# Patient Record
Sex: FEMALE | Race: BLACK OR AFRICAN AMERICAN | NOT HISPANIC OR LATINO | Employment: FULL TIME | URBAN - METROPOLITAN AREA
[De-identification: names, ages, dates, MRNs, and addresses within clinical notes are randomized per-mention and may not be internally consistent; named-entity substitution may affect disease eponyms.]

---

## 2017-01-02 ENCOUNTER — HOSPITAL ENCOUNTER (EMERGENCY)
Facility: OTHER | Age: 22
Discharge: HOME | End: 2017-01-02
Attending: OBSTETRICS & GYNECOLOGY
Payer: MEDICAID

## 2017-01-02 VITALS — SYSTOLIC BLOOD PRESSURE: 116 MMHG | TEMPERATURE: 97 F | HEART RATE: 83 BPM | DIASTOLIC BLOOD PRESSURE: 67 MMHG

## 2017-01-02 DIAGNOSIS — Z3A.20 20 WEEKS GESTATION OF PREGNANCY: ICD-10-CM

## 2017-01-02 DIAGNOSIS — R10.9 ABDOMINAL CRAMPS: Primary | ICD-10-CM

## 2017-01-02 LAB
BILIRUB SERPL-MCNC: NEGATIVE MG/DL
BLOOD URINE, POC: NEGATIVE
CANDIDA RRNA VAG QL PROBE: NEGATIVE
COLOR, POC UA: YELLOW
G VAGINALIS RRNA GENITAL QL PROBE: NEGATIVE
GLUCOSE UR QL STRIP: NEGATIVE
KETONES UR QL STRIP: NEGATIVE
LEUKOCYTE ESTERASE URINE, POC: NEGATIVE
NITRITE, POC UA: NEGATIVE
PH, POC UA: 7
PROTEIN, POC: NORMAL
SPECIFIC GRAVITY, POC UA: 1.01
T VAGINALIS RRNA GENITAL QL PROBE: NEGATIVE
UROBILINOGEN, POC UA: NEGATIVE

## 2017-01-02 PROCEDURE — 99283 EMERGENCY DEPT VISIT LOW MDM: CPT | Mod: ,,, | Performed by: OBSTETRICS & GYNECOLOGY

## 2017-01-02 PROCEDURE — 87480 CANDIDA DNA DIR PROBE: CPT

## 2017-01-02 PROCEDURE — 99283 EMERGENCY DEPT VISIT LOW MDM: CPT

## 2017-01-02 PROCEDURE — 87591 N.GONORRHOEAE DNA AMP PROB: CPT

## 2017-01-02 NOTE — ED PROVIDER NOTES
Encounter Date: 1/2/2017       History     Chief Complaint   Patient presents with    Abdominal Cramping     Review of patient's allergies indicates:  No Known Allergies  HPI Comments: Ms. Harris is a 22yo G1 at 20 1/7 wga with prenatal care with Dr. Rajeev Cisneros.  She states that last night she started having continuous lower abdominal cramping.  This was not after intercourse.  She has had no new sexual partners.     No vaginal bleeding, has white vaginal discharge with foul smell.  No fetal movement yet in the pregnancy.  She states that it has been uncomplicated to this point.    Denies ctx, vaginal bleeding, LOF.  The history is provided by the patient.     No past medical history on file.  No past medical history pertinent negatives.  No past surgical history on file.  Family History   Problem Relation Age of Onset    Breast cancer Neg Hx     Colon cancer Neg Hx     Ovarian cancer Neg Hx      Social History   Substance Use Topics    Smoking status: Never Smoker    Smokeless tobacco: Not on file    Alcohol use No     Review of Systems   Constitutional: Negative for activity change, appetite change, chills and fever.   HENT: Negative for sore throat.    Respiratory: Negative for apnea, cough, shortness of breath and wheezing.    Cardiovascular: Negative for chest pain.   Gastrointestinal: Negative for nausea.   Genitourinary: Positive for vaginal discharge. Negative for difficulty urinating, dysuria and vaginal bleeding.   Musculoskeletal: Negative for back pain.   Skin: Negative for rash.   Neurological: Negative for dizziness, syncope, weakness, light-headedness and headaches.   Hematological: Does not bruise/bleed easily.       Physical Exam   Initial Vitals   BP Pulse Resp Temp SpO2   -- -- -- -- --          Temp:  [96.6 °F (35.9 °C)] 96.6 °F (35.9 °C)  Pulse:  [83] 83  BP: (116)/(67) 116/67    Physical Exam    Nursing note and vitals reviewed.  Constitutional: She appears well-developed and  well-nourished. She is not diaphoretic. No distress.   HENT:   Head: Normocephalic and atraumatic.   Eyes: Conjunctivae and EOM are normal.   Neck: Normal range of motion.   Cardiovascular: Normal rate.   Pulmonary/Chest: No respiratory distress.   Abdominal:   Gravid, size appropriate for dates    Musculoskeletal: Normal range of motion.   Neurological: She is alert and oriented to person, place, and time.   Skin: Skin is warm and dry.   Psychiatric: She has a normal mood and affect. Her behavior is normal. Judgment and thought content normal.     OB LABOR EXAM:   Pre-Term Labor: No.   Membranes ruptured: No.     Vaginal Bleeding: none present.             Comments: Fetal heart rate 140  No contractions       ED Course   Procedures  Labs Reviewed   VAGINOSIS SCREEN BY DNA PROBE   C. TRACHOMATIS/N. GONORRHOEAE BY AMP DNA   POCT URINALYSIS, DIPSTICK OR TABLET REAGENT, AUTOMATED, WITH MICROSCOP             Medical Decision Making:   Initial Assessment:   Gurdeep Harris is a 21 y.o. With minor cramping, no contractions, no vaginal bleeding  Differential Diagnosis:   Abdominal cramps    ED Management:  - PO hydration  -   - Udip negative  - GC/CT, affirm collected  - Wet prep negative  - RTC for prenatal appointment as scheduled  - Discuss with primary OB where to delivery and begin transfer of medical record when appropriate  - Pt was given routine pregnancy instructions including to return to triage if she had any vaginal bleeding (other than spotting for the next 48hrs), any loss of fluid like her water broke, decreased fetal movement, or contractions Q 5min  lasting for 2 or more hours. Pt was also instructed to drink copious water. Patient voiced understanding of all theseinstructions and was subsequently discharged home.    Ammy Ambrosio MD, PhD  OBGYN, PGY-1    Other:   I discussed test(s) with the performing physician.  Plan discussed with staff physician Dr. Dobbins                   ED Course    Comment By Time   POCT Udip with trace protein Ammy Ambrosio MD 01/02 1624     Clinical Impression:   The encounter diagnosis was Abdominal cramps.          Ammy Ambrosio MD  Resident  01/02/17 3254

## 2017-01-02 NOTE — ED AVS SNAPSHOT
[facility]  [address]  [place] [address]               Gurdeep JUAREZ    2017  3:26 PM   ED    Description:  Female : 1995   Department:  [facility]           Your Care was Coordinated By:     Provider Role From To    Janay Dobbins DO Attending Provider 17 1524 --      Reason for Visit     Abdominal Cramping           Diagnoses this Visit        Comments    Abdominal cramps    -  Primary       ED Disposition     ED Disposition Condition Comment    Discharge             To Do List           Follow-up Information     Please follow up.    Why:  prenatal appointment as scheduled with Dr. Bryant [facility]     [facility] Nurse Care Line -  Assistance  Registered nurses in the [facility] provide clinical advisement, health education, appointment booking, and other advisory services.  Call for this free service at [phone].             Medications           Message regarding Medications     Verify the changes and/or additions to your medication regime listed below are the same as discussed with your clinician today.  If any of these changes or additions are incorrect, please notify your healthcare provider.             Verify that the below list of medications is an accurate representation of the medications you are currently taking.  If none reported, the list may be blank. If incorrect, please contact your healthcare provider. Carry this list with you in case of emergency.           Current Medications     prenatal vit#103-iron-FA () 27 mg iron- 1 mg Tab Take 1 tablet by mouth once daily.           Clinical Reference Information           Your Vitals Were     BP Pulse Temp Last Period          116/67 83 96.6 °F (35.9 °C) 2016 (Approximate)        Allergies as of 2017     No Known Allergies      Immunizations Administered on Date of Encounter - 2017     None      ED Micro,  Lab, POCT     Start Ordered       Status Ordering Provider    01/02/17 1642 01/02/17 1641  C. trachomatis/N. gonorrhoeae by AMP DNA Vagina  STAT      In process     01/02/17 1617 01/02/17 1616  POCT urinalysis, dipstick or tablet reag  Once      Final result     01/02/17 1617 01/02/17 1616  Vaginosis Screen by DNA Probe  Once      In process       ED Imaging Orders     None        Discharge Instructions       Call clinic [phone] or L & D after hours at [phone] for vaginal bleeding, leakage of fluids, contractions 4-5 in one hour, decreased fetal movements ( 10 kicks in 2 hours), headache not relieved by Tylenol, blurry vision, or temp of 100.4 or greater.  Begin doing fetal kick counts, at least 10 movements in 2 hours starting at 28 weeks gestation.  Keep next clinic appointment        [facility] Sign-Up     Activating your [facility] account is as easy as 1-2-3!     1) Visit [email or web address], select Sign Up Now, enter this activation code and your date of birth, then select Next.  [email or web address]  Expires: 2/16/2017  4:46 PM      2) Create a username and password to use when you visit [facility] in the future and select a security question in case you lose your password and select Next.    3) Enter your e-mail address and click Sign Up!    Additional Information  If you have questions, please e-mail [email or web address] or call [phone] to talk to our [facility] staff. Remember, [facility] is NOT to be used for urgent needs. For medical emergencies, dial 911.          [facility] complies with applicable Federal civil rights laws and does not discriminate on the basis of race, color, national origin, age, disability, or sex.        Language Assistance Services     ATTENTION: Language assistance services are available, free of charge. Please call [phone].      ATENCIÓN: Si habla español, tiene a levy disposición servicios gratuitos de asistencia lingüística. Llame al  [phone].     MARINO Ý: N?u b?n nói Ti?ng Vi?t, có các d?ch v? h? tr? ngôn ng? mi?n phí dành cho b?n. G?i s? [phone].

## 2017-01-02 NOTE — DISCHARGE INSTRUCTIONS
Call clinic [phone] or L & D after hours at [phone] for vaginal bleeding, leakage of fluids, contractions 4-5 in one hour, decreased fetal movements ( 10 kicks in 2 hours), headache not relieved by Tylenol, blurry vision, or temp of 100.4 or greater.  Begin doing fetal kick counts, at least 10 movements in 2 hours starting at 28 weeks gestation.  Keep next clinic appointment

## 2017-01-04 LAB
C TRACH DNA SPEC QL NAA+PROBE: NEGATIVE
N GONORRHOEA DNA SPEC QL NAA+PROBE: NEGATIVE

## 2017-05-08 ENCOUNTER — HOSPITAL ENCOUNTER (INPATIENT)
Facility: HOSPITAL | Age: 22
LOS: 3 days | Discharge: HOME | End: 2017-05-11
Attending: OBSTETRICS & GYNECOLOGY | Admitting: OBSTETRICS & GYNECOLOGY
Payer: MEDICAID

## 2017-05-08 DIAGNOSIS — O14.13 SEVERE PRE-ECLAMPSIA IN THIRD TRIMESTER: ICD-10-CM

## 2017-05-08 DIAGNOSIS — Z34.90 PREGNANCY WITH ONE FETUS: ICD-10-CM

## 2017-05-08 LAB
ABO + RH BLD: NORMAL
ALBUMIN SERPL BCP-MCNC: 2.6 G/DL
ALP SERPL-CCNC: 205 U/L
ALT SERPL W/O P-5'-P-CCNC: 7 U/L
ANION GAP SERPL CALC-SCNC: 8 MMOL/L
ANISOCYTOSIS BLD QL SMEAR: ABNORMAL
AST SERPL-CCNC: 16 U/L
BASOPHILS # BLD AUTO: 0.03 K/UL
BASOPHILS NFR BLD: 0.4 %
BILIRUB SERPL-MCNC: 0.7 MG/DL
BILIRUB UR QL STRIP: NEGATIVE
BLD GP AB SCN CELLS X3 SERPL QL: NORMAL
BUN SERPL-MCNC: 3 MG/DL
CALCIUM SERPL-MCNC: 8.7 MG/DL
CHLORIDE SERPL-SCNC: 107 MMOL/L
CLARITY UR: CLEAR
CO2 SERPL-SCNC: 25 MMOL/L
COLOR UR: YELLOW
CREAT SERPL-MCNC: 0.7 MG/DL
CREAT UR-MCNC: 46.9 MG/DL
DACRYOCYTES BLD QL SMEAR: ABNORMAL
DIFFERENTIAL METHOD: ABNORMAL
EOSINOPHIL # BLD AUTO: 0.3 K/UL
EOSINOPHIL NFR BLD: 3.9 %
ERYTHROCYTE [DISTWIDTH] IN BLOOD BY AUTOMATED COUNT: 17.8 %
EST. GFR  (AFRICAN AMERICAN): >60 ML/MIN/1.73 M^2
EST. GFR  (NON AFRICAN AMERICAN): >60 ML/MIN/1.73 M^2
GLUCOSE SERPL-MCNC: 73 MG/DL
GLUCOSE UR QL STRIP: NEGATIVE
HCT VFR BLD AUTO: 27.7 %
HGB BLD-MCNC: 8.4 G/DL
HGB UR QL STRIP: NEGATIVE
HYPOCHROMIA BLD QL SMEAR: ABNORMAL
KETONES UR QL STRIP: NEGATIVE
LDH SERPL L TO P-CCNC: 228 U/L
LEUKOCYTE ESTERASE UR QL STRIP: NEGATIVE
LYMPHOCYTES # BLD AUTO: 1.4 K/UL
LYMPHOCYTES NFR BLD: 19.7 %
MCH RBC QN AUTO: 22.7 PG
MCHC RBC AUTO-ENTMCNC: 30.3 %
MCV RBC AUTO: 75 FL
MONOCYTES # BLD AUTO: 0.7 K/UL
MONOCYTES NFR BLD: 9.4 %
NEUTROPHILS # BLD AUTO: 4.9 K/UL
NEUTROPHILS NFR BLD: 66.9 %
NITRITE UR QL STRIP: NEGATIVE
OVALOCYTES BLD QL SMEAR: ABNORMAL
PH UR STRIP: 7 [PH] (ref 5–8)
PLATELET # BLD AUTO: 277 K/UL
PLATELET BLD QL SMEAR: ABNORMAL
PMV BLD AUTO: 9.4 FL
POIKILOCYTOSIS BLD QL SMEAR: SLIGHT
POLYCHROMASIA BLD QL SMEAR: ABNORMAL
POTASSIUM SERPL-SCNC: 2.9 MMOL/L
PROT SERPL-MCNC: 6.7 G/DL
PROT UR QL STRIP: NEGATIVE
PROT UR-MCNC: 11 MG/DL
PROT/CREAT RATIO, UR: 0.23
RBC # BLD AUTO: 3.7 M/UL
SODIUM SERPL-SCNC: 140 MMOL/L
SP GR UR STRIP: 1 (ref 1–1.03)
URATE SERPL-MCNC: 4.3 MG/DL
URN SPEC COLLECT METH UR: ABNORMAL
UROBILINOGEN UR STRIP-ACNC: ABNORMAL EU/DL
WBC # BLD AUTO: 7.27 K/UL

## 2017-05-08 PROCEDURE — 83615 LACTATE (LD) (LDH) ENZYME: CPT

## 2017-05-08 PROCEDURE — 84550 ASSAY OF BLOOD/URIC ACID: CPT

## 2017-05-08 PROCEDURE — 80053 COMPREHEN METABOLIC PANEL: CPT

## 2017-05-08 PROCEDURE — 63600175 PHARM REV CODE 636 W HCPCS: Performed by: OBSTETRICS & GYNECOLOGY

## 2017-05-08 PROCEDURE — 82570 ASSAY OF URINE CREATININE: CPT

## 2017-05-08 PROCEDURE — 86920 COMPATIBILITY TEST SPIN: CPT

## 2017-05-08 PROCEDURE — 72100002 HC LABOR CARE, 1ST 8 HOURS

## 2017-05-08 PROCEDURE — 25000003 PHARM REV CODE 250: Performed by: OBSTETRICS & GYNECOLOGY

## 2017-05-08 PROCEDURE — 86901 BLOOD TYPING SEROLOGIC RH(D): CPT

## 2017-05-08 PROCEDURE — 11000001 HC ACUTE MED/SURG PRIVATE ROOM

## 2017-05-08 PROCEDURE — 86592 SYPHILIS TEST NON-TREP QUAL: CPT

## 2017-05-08 PROCEDURE — 3E033VJ INTRODUCTION OF OTHER HORMONE INTO PERIPHERAL VEIN, PERCUTANEOUS APPROACH: ICD-10-PCS | Performed by: OBSTETRICS & GYNECOLOGY

## 2017-05-08 PROCEDURE — 81003 URINALYSIS AUTO W/O SCOPE: CPT

## 2017-05-08 PROCEDURE — 86900 BLOOD TYPING SEROLOGIC ABO: CPT

## 2017-05-08 PROCEDURE — 85025 COMPLETE CBC W/AUTO DIFF WBC: CPT

## 2017-05-08 RX ORDER — MEPERIDINE HYDROCHLORIDE 50 MG/ML
50 INJECTION INTRAMUSCULAR; INTRAVENOUS; SUBCUTANEOUS EVERY 4 HOURS PRN
Status: DISCONTINUED | OUTPATIENT
Start: 2017-05-08 | End: 2017-05-09

## 2017-05-08 RX ORDER — POTASSIUM CHLORIDE 7.45 MG/ML
10 INJECTION INTRAVENOUS
Status: COMPLETED | OUTPATIENT
Start: 2017-05-08 | End: 2017-05-08

## 2017-05-08 RX ORDER — CARBOPROST TROMETHAMINE 250 UG/ML
250 INJECTION, SOLUTION INTRAMUSCULAR
Status: DISCONTINUED | OUTPATIENT
Start: 2017-05-08 | End: 2017-05-12 | Stop reason: HOSPADM

## 2017-05-08 RX ORDER — OXYTOCIN/RINGER'S LACTATE 20/1000 ML
2 PLASTIC BAG, INJECTION (ML) INTRAVENOUS CONTINUOUS
Status: DISCONTINUED | OUTPATIENT
Start: 2017-05-08 | End: 2017-05-09

## 2017-05-08 RX ORDER — METHYLERGONOVINE MALEATE 0.2 MG/ML
200 INJECTION INTRAVENOUS
Status: DISCONTINUED | OUTPATIENT
Start: 2017-05-08 | End: 2017-05-09

## 2017-05-08 RX ORDER — HYDRALAZINE HYDROCHLORIDE 20 MG/ML
10 INJECTION INTRAMUSCULAR; INTRAVENOUS ONCE AS NEEDED
Status: COMPLETED | OUTPATIENT
Start: 2017-05-08 | End: 2017-05-08

## 2017-05-08 RX ORDER — SODIUM CHLORIDE, SODIUM LACTATE, POTASSIUM CHLORIDE, CALCIUM CHLORIDE 600; 310; 30; 20 MG/100ML; MG/100ML; MG/100ML; MG/100ML
INJECTION, SOLUTION INTRAVENOUS CONTINUOUS
Status: DISCONTINUED | OUTPATIENT
Start: 2017-05-08 | End: 2017-05-09

## 2017-05-08 RX ORDER — MAGNESIUM SULFATE HEPTAHYDRATE 40 MG/ML
2 INJECTION, SOLUTION INTRAVENOUS ONCE
Status: COMPLETED | OUTPATIENT
Start: 2017-05-09 | End: 2017-05-09

## 2017-05-08 RX ORDER — ONDANSETRON 8 MG/1
8 TABLET, ORALLY DISINTEGRATING ORAL EVERY 8 HOURS PRN
Status: DISCONTINUED | OUTPATIENT
Start: 2017-05-08 | End: 2017-05-09

## 2017-05-08 RX ORDER — DEXTROSE, SODIUM CHLORIDE, SODIUM LACTATE, POTASSIUM CHLORIDE, AND CALCIUM CHLORIDE 5; .6; .31; .03; .02 G/100ML; G/100ML; G/100ML; G/100ML; G/100ML
INJECTION, SOLUTION INTRAVENOUS CONTINUOUS
Status: DISCONTINUED | OUTPATIENT
Start: 2017-05-08 | End: 2017-05-09

## 2017-05-08 RX ORDER — CALCIUM GLUCONATE 98 MG/ML
1 INJECTION, SOLUTION INTRAVENOUS
Status: DISCONTINUED | OUTPATIENT
Start: 2017-05-09 | End: 2017-05-09

## 2017-05-08 RX ORDER — OXYTOCIN/RINGER'S LACTATE 20/1000 ML
41.65 PLASTIC BAG, INJECTION (ML) INTRAVENOUS CONTINUOUS
Status: ACTIVE | OUTPATIENT
Start: 2017-05-08 | End: 2017-05-08

## 2017-05-08 RX ORDER — HYDRALAZINE HYDROCHLORIDE 20 MG/ML
20 INJECTION INTRAMUSCULAR; INTRAVENOUS EVERY 4 HOURS PRN
Status: DISCONTINUED | OUTPATIENT
Start: 2017-05-08 | End: 2017-05-09

## 2017-05-08 RX ORDER — SODIUM CHLORIDE, SODIUM LACTATE, POTASSIUM CHLORIDE, CALCIUM CHLORIDE 600; 310; 30; 20 MG/100ML; MG/100ML; MG/100ML; MG/100ML
1000 INJECTION, SOLUTION INTRAVENOUS CONTINUOUS
Status: DISCONTINUED | OUTPATIENT
Start: 2017-05-09 | End: 2017-05-09

## 2017-05-08 RX ORDER — HYDRALAZINE HYDROCHLORIDE 20 MG/ML
5 INJECTION INTRAMUSCULAR; INTRAVENOUS ONCE AS NEEDED
Status: COMPLETED | OUTPATIENT
Start: 2017-05-08 | End: 2017-05-08

## 2017-05-08 RX ORDER — MISOPROSTOL 200 UG/1
200 TABLET ORAL
Status: DISCONTINUED | OUTPATIENT
Start: 2017-05-08 | End: 2017-05-09

## 2017-05-08 RX ADMIN — HYDRALAZINE HYDROCHLORIDE 10 MG: 20 INJECTION INTRAMUSCULAR; INTRAVENOUS at 06:05

## 2017-05-08 RX ADMIN — DEXTROSE, SODIUM CHLORIDE, SODIUM LACTATE, POTASSIUM CHLORIDE, AND CALCIUM CHLORIDE: 5; .6; .31; .03; .02 INJECTION, SOLUTION INTRAVENOUS at 09:05

## 2017-05-08 RX ADMIN — DEXTROSE, SODIUM CHLORIDE, SODIUM LACTATE, POTASSIUM CHLORIDE, AND CALCIUM CHLORIDE: 5; .6; .31; .03; .02 INJECTION, SOLUTION INTRAVENOUS at 02:05

## 2017-05-08 RX ADMIN — Medication 2 MILLI-UNITS/MIN: at 03:05

## 2017-05-08 RX ADMIN — POTASSIUM CHLORIDE 10 MEQ: 7.46 INJECTION, SOLUTION INTRAVENOUS at 04:05

## 2017-05-08 RX ADMIN — POTASSIUM CHLORIDE 10 MEQ: 7.46 INJECTION, SOLUTION INTRAVENOUS at 05:05

## 2017-05-08 RX ADMIN — HYDRALAZINE HYDROCHLORIDE 20 MG: 20 INJECTION INTRAMUSCULAR; INTRAVENOUS at 02:05

## 2017-05-08 NOTE — TREATMENT PLAN
Dr. Bernal called with pt's labs results, vag exam, and BP's.  Order received for K riders x 2 and to start pitocin per protocol.  Plan of care discussed with pt/family.

## 2017-05-08 NOTE — TREATMENT PLAN
Notified Dr. Bernal of pts BP upon arrival. Ordered to start IV with D5LR, Hydralazine IV push, Pre-E workup, and start Pitocin to prepare for delivery.

## 2017-05-08 NOTE — IP AVS SNAPSHOT
[place] [address] [place] [address]  Phone: [phone]           Patient Discharge Instructions   Our goal is to set you up for success. This packet includes information on your condition, medications, and your home care.  It will help you care for yourself to prevent having to return to the hospital.     Please ask your nurse if you have any questions.      There are many details to remember when preparing to leave the hospital. Here is what you will need to do:    1. Take your medicine. If you are prescribed medications, review your Medication List on the following pages. You may have new medications to  at the pharmacy and others that you'll need to stop taking. Review the instructions for how and when to take your medications. Talk with your doctor or nurses if you are unsure of what to do.     2. Go to your follow-up appointments. Specific follow-up information is listed in the following pages. Your may be contacted by a nurse or clinical provider about future appointments. Be sure we have all of the phone numbers to reach you. Please contact your provider's office if you are unable to make an appointment.     3. Watch for warning signs. Your doctor or nurse will give you detailed warning signs to watch for and when to call for assistance. These instructions may also include educational information about your condition. If you experience any of warning signs to your health, call your doctor.               ** Verify the list of medication(s) below is accurate and up to date. Carry this with you in case of emergency. If your medications have changed, please notify your healthcare provider.             Medication List      START taking these medications        Additional Info                      acetaminophen 325 MG tablet   Commonly known as:  TYLENOL   Quantity:  30 tablet   Refills:  0   Dose:  650 mg    Last time this was given:  1,000 mg on 5/11/2017  5:58 AM    Instructions:  Take 2 tablets (650 mg total) by mouth every 6 (six) hours as needed.     Begin Date    AM    Noon    PM    Bedtime       ibuprofen 600 MG tablet   Commonly known as:  ADVIL,MOTRIN   Quantity:  40 tablet   Refills:  1   Dose:  600 mg    Last time this was given:  600 mg on 5/11/2017  5:12 PM   Instructions:  Take 1 tablet (600 mg total) by mouth every 6 (six) hours.     Begin Date    AM    Noon    PM    Bedtime       nifedipine 30 MG (OSM) 24 hr tablet   Commonly known as:  PROCARDIA-XL   Quantity:  30 tablet   Refills:  6   Dose:  30 mg    Last time this was given:  30 mg on 5/11/2017 12:28 PM   Instructions:  Take 1 tablet (30 mg total) by mouth once daily.     Begin Date    AM    Noon    PM    Bedtime       oxycodone-acetaminophen 5-325 mg per tablet   Commonly known as:  PERCOCET   Quantity:  30 tablet   Refills:  0   Dose:  1 tablet    Last time this was given:  1 tablet on 5/10/2017  7:37 PM   Instructions:  Take 1 tablet by mouth every 6 (six) hours as needed for Pain (severe post delivery pain).     Begin Date    AM    Noon    PM    Bedtime         CONTINUE taking these medications        Additional Info                      prenatal vit#103-iron-FA 27 mg iron- 1 mg Tab   Commonly known as:     Quantity:  30 tablet   Refills:  12   Dose:  1 tablet    Instructions:  Take 1 tablet by mouth once daily.     Begin Date    AM    Noon    PM    Bedtime            Where to Get Your Medications      You can get these medications from any pharmacy     Bring a paper prescription for each of these medications     acetaminophen 325 MG tablet    ibuprofen 600 MG tablet    nifedipine 30 MG (OSM) 24 hr tablet    oxycodone-acetaminophen 5-325 mg per tablet                  Please bring to all follow up appointments:    1. A copy of your discharge instructions.  2. All medicines you are currently taking in their original bottles.  3. Identification and insurance card.    Please arrive 15 minutes ahead  of scheduled appointment time.    Please call 24 hours in advance if you must reschedule your appointment and/or time.        Follow-up Information     Follow up In 2 weeks.        Follow up In 2 weeks.        Discharge Instructions     Future Orders    Activity as tolerated     Call MD for:  increased confusion or weakness     Call MD for:  persistent dizziness, light-headedness, or visual disturbances     Call MD for:  persistent nausea and vomiting or diarrhea     Call MD for:  redness, tenderness, or signs of infection (pain, swelling, redness, odor or green/yellow discharge around incision site)     Call MD for:  severe persistent headache     Call MD for:  severe uncontrolled pain     Call MD for:  temperature >100.4     Call MD for:  worsening rash     Diet general     Questions:    Total calories:      Fat restriction, if any:      Protein restriction, if any:      Na restriction, if any:      Fluid restriction:      Additional restrictions:      No dressing needed         Discharge Instructions       Instructed on Baby led bottle feeding.  Discussed:   Wash Hands   Hunger cues - hands to mouth, bending arms and legs toward the body, sucking noises, puckered lips and rooting/searching for the nipple   Method of feeding the baby  o always hold the baby upright, never prop a bottle  o brush the nipple across babys upper lip and wait to open  o hold bottle in a flat position, only partly full  o allow baby to pause and take breaks; burp as needed  o feeding lasts about 15 - 20 minutes  o Stop feeding when fullness cues are present  o Fullness cues - sucking slows or stops, relaxed hands and arms, pushes away, falls asleep  Pt verbalized understanding and provided appropriate recall.Infant fall prevention reviewed with mother. Mother verbalized understanding with no questions. Reviewed safety prevention facts and interventions which include the following:    *While you and your baby are in the hospital,  please remember these important safety tips to keep your baby safe.     -If you are feeling weak, faint, or unsteady on your feet, DO NOT life your baby. Press the nurse call button and ask for help.  -Keep your bed in the LOWEST position (closest to the floor) at all times.  -Do NOT sleep with your baby in your bed, sofa, or chair as this may place your baby at risk of serious injury.  -When you want to sleep, first place the baby in the bassinet.  -If we find you asleep with the baby in your arms, we will move your baby to the bassinet.    *Keep your baby safe.    -Breastfeeding every 2-3 hours  -Bottle feeding every 3-4 hours  -Do NOT feed the baby in the crib with the bottle propped up. Feed the baby in your arms.  -NO heavy blankets in the crib.  -NO stuffed animals in the infants crib.  -NO balloons attached to the infant's crib.  -Keep the infant WRAPPED with their HAT ON at all times.    Lactation Services - [phone]      Management of Engorgement in the Non-Nursing Mother    Your breast milk will usually come in within 3-5 days after delivery even if you have decided not to breastfeed.  Your breasts may become full, lumpy, hard and uncomfortable due to the glands filling with milk and swelling of the breast tissue, blood vessels, and lymph glands.  This is a temporary condition usually lasting 24-48 hrs.  If your breasts become uncomfortable during this time, you may use some or all of the comfort measures described below to obtain relief.    Measures to relieve discomfort:    1. Wear a well fitting supportive bra. It should not be too tight or it may lead to plugged ducts or a breast infection.  (We do not recommend that you bind your breasts with any type of wrap.)    2. If the breasts begin to feel heavy, warm or uncomfortably full, begin using cold compresses on your breasts. Cold compresses can relieve the swelling and provide comfort.  Cold application can be in the form of ice packs, gel packs,  frozen bags of vegetables or frozen wet towels. Cold compresses should be wrapped in a thin towel or a pillow case and applied to the breasts for 20 minutes at a time. This process can be repeated with a short break in between the applications of cold compresses until the swelling is relieved.     3.  Cold cabbage compresses can also be used to relieve pain and swelling.  Chilled cabbage leaves show similar pain reducing effects as cold compresses.  Some mothers prefer the cabbage leaves due to a stronger, more immediate effect. Cabbage leave effects are not clinically proven but many mothers find them very soothing.    How to apply chilled cabbage compresses:  rinse with cool water and refrigerate raw cabbage leaves.  Strip the large vein off the cold cabbage leaf and put the remaining part of the cabbage leaf directly on the breast. The leaves should be worn inside the bra until they wilt, usually within 2-4 hours.  When they wilt they should be replaced with fresh leaves.   If redness, rash, or itching occur stop use immediately.    4. Anti-inflammatory medications such as ibuprofen may be taken, with your physicians approval, to reduce inflammation and discomfort.     6. If fullness persists and remains painful even after all of the above measures are used, hand expressing a small amount of milk out of the breasts can provide an extra measure of comfort.  Warm showers, letting the warm water hit your back and roll over your shoulders to the breasts, while you gently express milk can make hand expressing a little easier. You should only remove enough milk to provide comfort and relief.   Repeat this process as often as needed to keep the breasts comfortable.    7. You can pump your breasts and remove just enough milk to feel softer, but not so much that more milk production is stimulated.   Repeat this process as often as needed to keep the breasts comfortable.    8. There is no need to limit the amount of  "fluids that you drink.       9. Engorgement will usually get better within 24-48 hrs; however, there may be some fullness and/or leaking of milk for several days. Wear breast pads to absorb any leaking milk and change them frequently.    10. If you have any flu-like symptoms such as fever, chills, feeling tired and achy or red areas on your breast, call your physician immediately.  11.Call the [facility], [phone], if the engorgement is not relieved or if you have questions.    Discharge References/Attachments     DISCHARGE INSTRUCTIONS: KEEPING YOUR  WARM  (ENGLISH)    DISCHARGE INSTRUCTIONS: PREVENTING SHAKEN BABY SYNDROME (ENGLISH)    , BATHING YOUR (ENGLISH)     RASH (ENGLISH)    , SKIN COLOR CHANGES IN (ENGLISH)    RASH, DIAPER (ENGLISH)        Primary Diagnosis     Your primary diagnosis was:  Severely Increased Blood Pressure And Swelling During Pregnancy      Admission Information     Date & Time Provider Department CSN    2017 12:55 PM Rajeev Bernal MD [facility] [id number]      Care Providers     Provider Role Specialty Primary office phone    Rajeev Bernal MD Attending Provider Obstetrics [phone]      Your Vitals Were     BP Pulse Temp Resp Height Weight    151/84 95 96.4 °F (35.8 °C) (Oral) 18 5' 2" (1.575 m) 77.1 kg (170 lb)    Last Period SpO2 BMI          2016 (Approximate) 100% 31.09 kg/m2        Recent Lab Values     No lab values to display.      Pending Labs     Order Current Status    Prepare RBC 2 Units; low H/H Preliminary result      Allergies as of 2017     No Known Allergies      [facility]     [facility] Care Line - 24/ Assistance  Unless otherwise directed by your provider, please contact [facility], our nurse care line that is available for 24/7 assistance.     Registered nurses in the [facility] provide clinical advisement, health education, appointment " booking, and other advisory services.  Call for this free service at [phone].        Advance Directives     An advance directive is a document which, in the event you are no longer able to make decisions for yourself, tells your healthcare team what kind of treatment you do or do not want to receive, or who you would like to make those decisions for you.  If you do not currently have an advance directive, [facility] encourages you to create one.  For more information call:  [phone]), [phone] ([phone]),  or log on to [email or web address].        Language Assistance Services     ATTENTION: Language assistance services are available, free of charge. Please call [phone].      ATENCIÓN: Si drela candie, tiene a levy disposición servicios gratuitos de asistencia lingüística. Llame al [phone].     CHÚ Ý: N?u b?n nói Ti?ng Vi?t, có các d?ch v? h? tr? ngôn ng? mi?n phí dành cho b?n. G?i s? [phone].        Blood Transfusion Reaction Signs and Symptoms     The blood you have received has been matched for you as carefully as possible. Most patients who receive a blood transfusion do not experience problems. However, there can be a delayed reaction that happens a few weeks after your blood transfusion. Contact your physician immediately if you experience any NEW SYMPTOMS listed below:     Fever greater than 100.4 degrees    Chills   Yellow color to your skin or eyes(Jaundice)   Back pain, chest pain, or pain at the infusion site   Weakness (more than usual)   Discomfort or uneasiness more than usual (Malaise)   Nausea or vomiting   Shortness of breath, wheezing, or coughing   Higher or lower blood pressure than normal   Skin rash, itching, skin redness, or localized swelling (example: hands or feet)   Urinating less than normal   Urine appears reddish or orange and is darker than normal      Remember that some these signs may already exist for you--such as  having chronic back pain or high blood pressure. You only need to look for and report to your doctor any new occurrences since your blood transfusion that are of concern.        [facility] Sign-Up     Activating your [facility] account is as easy as 1-2-3!     1) Visit [email or web address], select Sign Up Now, enter this activation code and your date of birth, then select Next.  [email or web address]  Expires: 6/25/2017  3:59 PM      2) Create a username and password to use when you visit [facility] in the future and select a security question in case you lose your password and select Next.    3) Enter your e-mail address and click Sign Up!    Additional Information  If you have questions, please e-mail [email or web address] or call [phone] to talk to our [facility] staff. Remember, [facility] is NOT to be used for urgent needs. For medical emergencies, dial 911.          [facility] complies with applicable Federal civil rights laws and does not discriminate on the basis of race, color, national origin, age, disability, or sex.

## 2017-05-08 NOTE — IP AVS SNAPSHOT
[place] [address] [place] [address]  Phone: [phone]                 ** Verify the list of medication(s) below is accurate and up to date. Carry this with you in case of emergency. If your medications have changed, please notify your healthcare provider.             Medication List      START taking these medications        Additional Info                      acetaminophen 325 MG tablet   Commonly known as:  TYLENOL   Quantity:  30 tablet   Refills:  0   Dose:  650 mg    Last time this was given:  1,000 mg on 5/11/2017  5:58 AM   Instructions:  Take 2 tablets (650 mg total) by mouth every 6 (six) hours as needed.     Begin Date    AM    Noon    PM    Bedtime       ibuprofen 600 MG tablet   Commonly known as:  ADVIL,MOTRIN   Quantity:  40 tablet   Refills:  1   Dose:  600 mg    Last time this was given:  600 mg on 5/11/2017  5:12 PM   Instructions:  Take 1 tablet (600 mg total) by mouth every 6 (six) hours.     Begin Date    AM    Noon    PM    Bedtime       nifedipine 30 MG (OSM) 24 hr tablet   Commonly known as:  PROCARDIA-XL   Quantity:  30 tablet   Refills:  6   Dose:  30 mg    Last time this was given:  30 mg on 5/11/2017 12:28 PM   Instructions:  Take 1 tablet (30 mg total) by mouth once daily.     Begin Date    AM    Noon    PM    Bedtime       oxycodone-acetaminophen 5-325 mg per tablet   Commonly known as:  PERCOCET   Quantity:  30 tablet   Refills:  0   Dose:  1 tablet    Last time this was given:  1 tablet on 5/10/2017  7:37 PM   Instructions:  Take 1 tablet by mouth every 6 (six) hours as needed for Pain (severe post delivery pain).     Begin Date    AM    Noon    PM    Bedtime         CONTINUE taking these medications        Additional Info                      prenatal vit#103-iron-FA 27 mg iron- 1 mg Tab   Commonly known as:     Quantity:  30 tablet   Refills:  12   Dose:  1 tablet    Instructions:  Take 1 tablet by mouth once daily.     Begin Date     AM    Noon    PM    Bedtime            Where to Get Your Medications      You can get these medications from any pharmacy     Bring a paper prescription for each of these medications     acetaminophen 325 MG tablet    ibuprofen 600 MG tablet    nifedipine 30 MG (OSM) 24 hr tablet    oxycodone-acetaminophen 5-325 mg per tablet                  Please bring to all follow up appointments:    1. A copy of your discharge instructions.  2. All medicines you are currently taking in their original bottles.  3. Identification and insurance card.    Please arrive 15 minutes ahead of scheduled appointment time.    Please call 24 hours in advance if you must reschedule your appointment and/or time.        Follow-up Information     Follow up In 2 weeks.        Follow up In 2 weeks.        Discharge Instructions     Future Orders    Activity as tolerated     Call MD for:  increased confusion or weakness     Call MD for:  persistent dizziness, light-headedness, or visual disturbances     Call MD for:  persistent nausea and vomiting or diarrhea     Call MD for:  redness, tenderness, or signs of infection (pain, swelling, redness, odor or green/yellow discharge around incision site)     Call MD for:  severe persistent headache     Call MD for:  severe uncontrolled pain     Call MD for:  temperature >100.4     Call MD for:  worsening rash     Diet general     Questions:    Total calories:      Fat restriction, if any:      Protein restriction, if any:      Na restriction, if any:      Fluid restriction:      Additional restrictions:      No dressing needed         Discharge Instructions       Instructed on Baby led bottle feeding.  Discussed:   Wash Hands   Hunger cues - hands to mouth, bending arms and legs toward the body, sucking noises, puckered lips and rooting/searching for the nipple   Method of feeding the baby  o always hold the baby upright, never prop a bottle  o brush the nipple across babys upper lip and wait to  open  o hold bottle in a flat position, only partly full  o allow baby to pause and take breaks; burp as needed  o feeding lasts about 15 - 20 minutes  o Stop feeding when fullness cues are present  o Fullness cues - sucking slows or stops, relaxed hands and arms, pushes away, falls asleep  Pt verbalized understanding and provided appropriate recall.Infant fall prevention reviewed with mother. Mother verbalized understanding with no questions. Reviewed safety prevention facts and interventions which include the following:    *While you and your baby are in the hospital, please remember these important safety tips to keep your baby safe.     -If you are feeling weak, faint, or unsteady on your feet, DO NOT life your baby. Press the nurse call button and ask for help.  -Keep your bed in the LOWEST position (closest to the floor) at all times.  -Do NOT sleep with your baby in your bed, sofa, or chair as this may place your baby at risk of serious injury.  -When you want to sleep, first place the baby in the bassinet.  -If we find you asleep with the baby in your arms, we will move your baby to the bassinet.    *Keep your baby safe.    -Breastfeeding every 2-3 hours  -Bottle feeding every 3-4 hours  -Do NOT feed the baby in the crib with the bottle propped up. Feed the baby in your arms.  -NO heavy blankets in the crib.  -NO stuffed animals in the infants crib.  -NO balloons attached to the infant's crib.  -Keep the infant WRAPPED with their HAT ON at all times.    Lactation Services - [phone]      Management of Engorgement in the Non-Nursing Mother    Your breast milk will usually come in within 3-5 days after delivery even if you have decided not to breastfeed.  Your breasts may become full, lumpy, hard and uncomfortable due to the glands filling with milk and swelling of the breast tissue, blood vessels, and lymph glands.  This is a temporary condition usually lasting 24-48 hrs.  If your breasts become  uncomfortable during this time, you may use some or all of the comfort measures described below to obtain relief.    Measures to relieve discomfort:    1. Wear a well fitting supportive bra. It should not be too tight or it may lead to plugged ducts or a breast infection.  (We do not recommend that you bind your breasts with any type of wrap.)    2. If the breasts begin to feel heavy, warm or uncomfortably full, begin using cold compresses on your breasts. Cold compresses can relieve the swelling and provide comfort.  Cold application can be in the form of ice packs, gel packs, frozen bags of vegetables or frozen wet towels. Cold compresses should be wrapped in a thin towel or a pillow case and applied to the breasts for 20 minutes at a time. This process can be repeated with a short break in between the applications of cold compresses until the swelling is relieved.     3.  Cold cabbage compresses can also be used to relieve pain and swelling.  Chilled cabbage leaves show similar pain reducing effects as cold compresses.  Some mothers prefer the cabbage leaves due to a stronger, more immediate effect. Cabbage leave effects are not clinically proven but many mothers find them very soothing.    How to apply chilled cabbage compresses:  rinse with cool water and refrigerate raw cabbage leaves.  Strip the large vein off the cold cabbage leaf and put the remaining part of the cabbage leaf directly on the breast. The leaves should be worn inside the bra until they wilt, usually within 2-4 hours.  When they wilt they should be replaced with fresh leaves.   If redness, rash, or itching occur stop use immediately.    4. Anti-inflammatory medications such as ibuprofen may be taken, with your physicians approval, to reduce inflammation and discomfort.     6. If fullness persists and remains painful even after all of the above measures are used, hand expressing a small amount of milk out of the breasts can provide an extra  measure of comfort.  Warm showers, letting the warm water hit your back and roll over your shoulders to the breasts, while you gently express milk can make hand expressing a little easier. You should only remove enough milk to provide comfort and relief.   Repeat this process as often as needed to keep the breasts comfortable.    7. You can pump your breasts and remove just enough milk to feel softer, but not so much that more milk production is stimulated.   Repeat this process as often as needed to keep the breasts comfortable.    8. There is no need to limit the amount of fluids that you drink.       9. Engorgement will usually get better within 24-48 hrs; however, there may be some fullness and/or leaking of milk for several days. Wear breast pads to absorb any leaking milk and change them frequently.    10. If you have any flu-like symptoms such as fever, chills, feeling tired and achy or red areas on your breast, call your physician immediately.  11.Call the [facility], [phone], if the engorgement is not relieved or if you have questions.    Discharge References/Attachments     DISCHARGE INSTRUCTIONS: KEEPING YOUR  WARM  (ENGLISH)    DISCHARGE INSTRUCTIONS: PREVENTING SHAKEN BABY SYNDROME (ENGLISH)    , BATHING YOUR (ENGLISH)     RASH (ENGLISH)    , SKIN COLOR CHANGES IN (ENGLISH)    RASH, DIAPER (ENGLISH)      Additional Information       Protect Your  from Cigarette Smoke  Youve likely heard about the dangers of secondhand smoke. But did you know that cigarette smoke is even worse for babies than it is for adults? Now that youve brought your  home, its crucial to keep cigarette smoke away from the baby. You may have already quit smoking when you found out you were going to have a baby. If not, its still not too late. If anyone else in your household smokes, now is the time for them to quit. If you or someone else in the household keeps  smoking, at the very least, you can make changes to protect the baby. This goes for anyone who spends time near the baby, including grandparents, friends, and babysitters.  How cigarette smoke can harm your baby  Research shows that smoking around newborns can cause severe health problems. These include:  · Asthma or other lifelong breathing problems  · Worsening of colds or other respiratory problems  · Poor growth and development, both mentally and physically  · Higher chance of SIDS (sudden infant death syndrome)     Ask smokers not to smoke near your baby. Be firm. Your babys health is at stake.   Protecting your baby from smoke  If someone in your household smokes and isnt ready to quit, you can still protect your baby. Ban smoking inside the house. Any smoker (including you, if you smoke) should smoke only outside, away from windows and doors. If you wear a jacket or sweatshirt while smoking, take it off before holding the baby. Never let anyone smoke around the baby. And never take the baby into an area where people are smoking. If you have visitors who smoke, you may want to explain your smoking rules before they come over, so they know what to expect.  Quitting is BEST for your baby  If you smoke, quitting is the best thing you can do for your baby. Quitting is hard, but you can do it! Here are some tips:  · Tape a picture of your  to your pack of cigarettes. Look at it each time you smoke. This will remind you of the best reason to quit.  · Join a support group or smoking cessation class. This will give you the support and skills you need to quit smoking. You may even meet other parents in the same situation. If you need help finding a group or class, your health care provider can suggest one in your area.  · Ask other smokers in the family to quit with you. This way, you can support each other.  · Talk to your health care provider about your desire to stop smoking. Both counseling and medications  "can help you successfully quit smoking.  · If you dont succeed the first time, try again! Many people have to try more than once before they quit for good. Just remember, youre doing it for your baby. Trying to quit is better for your baby than if youd never tried at all.        For more information  · smokefree.gov/qgag-ax-rj-expert  · National [employer] [place] Smoking Quitline: 877-44U-QUIT (035-753-3160)      Date Last Reviewed: 9/10/2014  © 1374-2724 [employer]. [address] [place] [address]. All rights reserved. This information is not intended as a substitute for professional medical care. Always follow your healthcare professional's instructions.                Admission Information     Date & Time Provider Department CSN    5/8/2017 12:55 PM Rajeev Bernal MD [facility] [id number]      Primary Diagnosis     Your primary diagnosis was:  Severely Increased Blood Pressure And Swelling During Pregnancy      Your Baby's Birth Measurements Were          Value    Length  5' 2" (1.575 m)    Weight  77.1 kg (170 lb)      Your Baby's Discharge Measurements Are          Value    Length  5' 2" (1.575 m)    Weight  77.1 kg (170 lb)      Your Baby's Discharge Vital Signs Are          Value    Temperature  96.4 °F (35.8 °C)    Pulse  95    Respirations  18    Blood Pressure  (!)  151/84      Immunizations Administered for This Admission     Name Date    MMR  Deferred ()    TDAP  Deferred ()      Recent Lab Values     No lab values to display.      Allergies as of 5/11/2017     No Known Allergies      [facility]     [facility] Nurse Care Line - 24/7 Assistance  Unless otherwise directed by your provider, please contact [facility], our nurse care line that is available for 24/7 assistance.     Registered nurses in the [facility] provide clinical advisement, health education, appointment booking, and other advisory services.  Call for this free " service at [phone].        Language Assistance Services     ATTENTION: Language assistance services are available, free of charge. Please call [phone].      ATENCIÓN: Si habla candie, tiene a levy disposición servicios gratuitos de asistencia lingüística. Llame al [phone].     CHÚ Ý: N?u b?n nói Ti?ng Vi?t, có các d?ch v? h? tr? ngôn ng? mi?n phí dành cho b?n. G?i s? [phone].         [facility] complies with applicable Federal civil rights laws and does not discriminate on the basis of race, color, national origin, age, disability, or sex.

## 2017-05-09 ENCOUNTER — ANESTHESIA (OUTPATIENT)
Dept: OBSTETRICS AND GYNECOLOGY | Facility: HOSPITAL | Age: 22
End: 2017-05-09
Payer: MEDICAID

## 2017-05-09 ENCOUNTER — ANESTHESIA EVENT (OUTPATIENT)
Dept: OBSTETRICS AND GYNECOLOGY | Facility: HOSPITAL | Age: 22
End: 2017-05-09
Payer: MEDICAID

## 2017-05-09 PROBLEM — Z34.90 PREGNANCY WITH ONE FETUS: Status: RESOLVED | Noted: 2017-05-08 | Resolved: 2017-05-09

## 2017-05-09 PROBLEM — Z34.90 PREGNANCY WITH ONE FETUS: Status: ACTIVE | Noted: 2017-05-09

## 2017-05-09 LAB — RPR SER QL: NORMAL

## 2017-05-09 PROCEDURE — 51702 INSERT TEMP BLADDER CATH: CPT

## 2017-05-09 PROCEDURE — 27800517 HC TRAY,EPIDURAL-CONTINUOUS: Performed by: ANESTHESIOLOGY

## 2017-05-09 PROCEDURE — 63600175 PHARM REV CODE 636 W HCPCS: Performed by: OBSTETRICS & GYNECOLOGY

## 2017-05-09 PROCEDURE — 59409 OBSTETRICAL CARE: CPT | Mod: AA,,, | Performed by: ANESTHESIOLOGY

## 2017-05-09 PROCEDURE — 25000003 PHARM REV CODE 250: Performed by: ANESTHESIOLOGY

## 2017-05-09 PROCEDURE — 0UQC7ZZ REPAIR CERVIX, VIA NATURAL OR ARTIFICIAL OPENING: ICD-10-PCS | Performed by: OBSTETRICS & GYNECOLOGY

## 2017-05-09 PROCEDURE — 27200710 HC EPIDURAL INFUSION PUMP SET: Performed by: ANESTHESIOLOGY

## 2017-05-09 PROCEDURE — 72100003 HC LABOR CARE, EA. ADDL. 8 HRS

## 2017-05-09 PROCEDURE — 11000001 HC ACUTE MED/SURG PRIVATE ROOM

## 2017-05-09 PROCEDURE — 10907ZC DRAINAGE OF AMNIOTIC FLUID, THERAPEUTIC FROM PRODUCTS OF CONCEPTION, VIA NATURAL OR ARTIFICIAL OPENING: ICD-10-PCS | Performed by: OBSTETRICS & GYNECOLOGY

## 2017-05-09 PROCEDURE — 25000003 PHARM REV CODE 250: Performed by: OBSTETRICS & GYNECOLOGY

## 2017-05-09 PROCEDURE — 62326 NJX INTERLAMINAR LMBR/SAC: CPT | Performed by: ANESTHESIOLOGY

## 2017-05-09 RX ORDER — HYDROCORTISONE 25 MG/G
CREAM TOPICAL 3 TIMES DAILY PRN
Status: DISCONTINUED | OUTPATIENT
Start: 2017-05-09 | End: 2017-05-12 | Stop reason: HOSPADM

## 2017-05-09 RX ORDER — OXYTOCIN/RINGER'S LACTATE 20/1000 ML
41.65 PLASTIC BAG, INJECTION (ML) INTRAVENOUS CONTINUOUS
Status: ACTIVE | OUTPATIENT
Start: 2017-05-09 | End: 2017-05-10

## 2017-05-09 RX ORDER — ACETAMINOPHEN 325 MG/1
650 TABLET ORAL EVERY 6 HOURS PRN
Status: DISCONTINUED | OUTPATIENT
Start: 2017-05-09 | End: 2017-05-12 | Stop reason: HOSPADM

## 2017-05-09 RX ORDER — ZOLPIDEM TARTRATE 5 MG/1
5 TABLET ORAL NIGHTLY PRN
Status: DISCONTINUED | OUTPATIENT
Start: 2017-05-09 | End: 2017-05-12 | Stop reason: HOSPADM

## 2017-05-09 RX ORDER — HYDROMORPHONE HYDROCHLORIDE 2 MG/ML
1 INJECTION, SOLUTION INTRAMUSCULAR; INTRAVENOUS; SUBCUTANEOUS EVERY 4 HOURS PRN
Status: DISCONTINUED | OUTPATIENT
Start: 2017-05-09 | End: 2017-05-12 | Stop reason: HOSPADM

## 2017-05-09 RX ORDER — OXYCODONE AND ACETAMINOPHEN 10; 325 MG/1; MG/1
1 TABLET ORAL EVERY 4 HOURS PRN
Status: DISCONTINUED | OUTPATIENT
Start: 2017-05-09 | End: 2017-05-12 | Stop reason: HOSPADM

## 2017-05-09 RX ORDER — SIMETHICONE 80 MG
1 TABLET,CHEWABLE ORAL EVERY 6 HOURS PRN
Status: DISCONTINUED | OUTPATIENT
Start: 2017-05-09 | End: 2017-05-12 | Stop reason: HOSPADM

## 2017-05-09 RX ORDER — MISOPROSTOL 200 UG/1
200 TABLET ORAL 4 TIMES DAILY PRN
Status: ACTIVE | OUTPATIENT
Start: 2017-05-09 | End: 2017-05-10

## 2017-05-09 RX ORDER — DIPHENHYDRAMINE HYDROCHLORIDE 50 MG/ML
25 INJECTION INTRAMUSCULAR; INTRAVENOUS EVERY 4 HOURS PRN
Status: DISCONTINUED | OUTPATIENT
Start: 2017-05-09 | End: 2017-05-12 | Stop reason: HOSPADM

## 2017-05-09 RX ORDER — IBUPROFEN 600 MG/1
600 TABLET ORAL EVERY 6 HOURS
Status: DISCONTINUED | OUTPATIENT
Start: 2017-05-10 | End: 2017-05-12 | Stop reason: HOSPADM

## 2017-05-09 RX ORDER — DIPHENHYDRAMINE HCL 25 MG
25 CAPSULE ORAL EVERY 4 HOURS PRN
Status: DISCONTINUED | OUTPATIENT
Start: 2017-05-09 | End: 2017-05-12 | Stop reason: HOSPADM

## 2017-05-09 RX ORDER — ONDANSETRON 8 MG/1
8 TABLET, ORALLY DISINTEGRATING ORAL EVERY 8 HOURS PRN
Status: DISCONTINUED | OUTPATIENT
Start: 2017-05-09 | End: 2017-05-12 | Stop reason: HOSPADM

## 2017-05-09 RX ORDER — LABETALOL HYDROCHLORIDE 5 MG/ML
20 INJECTION, SOLUTION INTRAVENOUS ONCE
Status: COMPLETED | OUTPATIENT
Start: 2017-05-09 | End: 2017-05-09

## 2017-05-09 RX ORDER — OXYCODONE AND ACETAMINOPHEN 5; 325 MG/1; MG/1
1 TABLET ORAL EVERY 4 HOURS PRN
Status: DISCONTINUED | OUTPATIENT
Start: 2017-05-09 | End: 2017-05-12 | Stop reason: HOSPADM

## 2017-05-09 RX ORDER — BUPIVACAINE HYDROCHLORIDE 2.5 MG/ML
INJECTION, SOLUTION EPIDURAL; INFILTRATION; INTRACAUDAL
Status: DISCONTINUED | OUTPATIENT
Start: 2017-05-09 | End: 2017-05-09

## 2017-05-09 RX ORDER — MISOPROSTOL 200 UG/1
400 TABLET ORAL ONCE
Status: DISCONTINUED | OUTPATIENT
Start: 2017-05-09 | End: 2017-05-12 | Stop reason: HOSPADM

## 2017-05-09 RX ORDER — DOCUSATE SODIUM 100 MG/1
200 CAPSULE, LIQUID FILLED ORAL 2 TIMES DAILY PRN
Status: DISCONTINUED | OUTPATIENT
Start: 2017-05-09 | End: 2017-05-12 | Stop reason: HOSPADM

## 2017-05-09 RX ORDER — LABETALOL HYDROCHLORIDE 5 MG/ML
20 INJECTION, SOLUTION INTRAVENOUS
Status: DISCONTINUED | OUTPATIENT
Start: 2017-05-09 | End: 2017-05-12 | Stop reason: HOSPADM

## 2017-05-09 RX ORDER — MEPERIDINE HYDROCHLORIDE 50 MG/ML
50 INJECTION INTRAMUSCULAR; INTRAVENOUS; SUBCUTANEOUS ONCE
Status: COMPLETED | OUTPATIENT
Start: 2017-05-09 | End: 2017-05-09

## 2017-05-09 RX ORDER — MAGNESIUM SULFATE HEPTAHYDRATE 40 MG/ML
2 INJECTION, SOLUTION INTRAVENOUS CONTINUOUS
Status: DISCONTINUED | OUTPATIENT
Start: 2017-05-09 | End: 2017-05-10

## 2017-05-09 RX ORDER — AMMONIA 15 % (W/V)
0.3 AMPUL (EA) INHALATION CONTINUOUS PRN
Status: DISCONTINUED | OUTPATIENT
Start: 2017-05-09 | End: 2017-05-12 | Stop reason: HOSPADM

## 2017-05-09 RX ORDER — AMOXICILLIN 250 MG
1 CAPSULE ORAL NIGHTLY
Status: DISCONTINUED | OUTPATIENT
Start: 2017-05-09 | End: 2017-05-12 | Stop reason: HOSPADM

## 2017-05-09 RX ADMIN — MAGNESIUM SULFATE IN WATER 2 G: 40 INJECTION, SOLUTION INTRAVENOUS at 09:05

## 2017-05-09 RX ADMIN — PROMETHAZINE HYDROCHLORIDE 25 MG: 25 INJECTION INTRAMUSCULAR; INTRAVENOUS at 12:05

## 2017-05-09 RX ADMIN — MISOPROSTOL 600 MCG: 200 TABLET ORAL at 05:05

## 2017-05-09 RX ADMIN — MAGNESIUM SULFATE HEPTAHYDRATE 2 G/HR: 40 INJECTION, SOLUTION INTRAVENOUS at 10:05

## 2017-05-09 RX ADMIN — Medication 41.65 MILLI-UNITS/MIN: at 08:05

## 2017-05-09 RX ADMIN — PROMETHAZINE HYDROCHLORIDE 12.5 MG: 25 INJECTION INTRAMUSCULAR; INTRAVENOUS at 06:05

## 2017-05-09 RX ADMIN — PROMETHAZINE HYDROCHLORIDE 12.5 MG: 25 INJECTION INTRAMUSCULAR; INTRAVENOUS at 12:05

## 2017-05-09 RX ADMIN — BUPIVACAINE HYDROCHLORIDE 10 ML: 2.5 INJECTION, SOLUTION EPIDURAL; INFILTRATION; INTRACAUDAL; PERINEURAL at 03:05

## 2017-05-09 RX ADMIN — MEPERIDINE HYDROCHLORIDE 50 MG: 50 INJECTION INTRAMUSCULAR; INTRAVENOUS; SUBCUTANEOUS at 12:05

## 2017-05-09 RX ADMIN — LABETALOL HYDROCHLORIDE 20 MG: 5 INJECTION, SOLUTION INTRAVENOUS at 06:05

## 2017-05-09 RX ADMIN — MEPERIDINE HYDROCHLORIDE 50 MG: 50 INJECTION INTRAMUSCULAR; INTRAVENOUS; SUBCUTANEOUS at 06:05

## 2017-05-09 RX ADMIN — HYDRALAZINE HYDROCHLORIDE 10 MG: 20 INJECTION INTRAMUSCULAR; INTRAVENOUS at 11:05

## 2017-05-09 NOTE — ANESTHESIA PREPROCEDURE EVALUATION
05/09/2017  Gurdeep Harris is a 21 y.o., female.    Anesthesia Evaluation    I have reviewed the Patient Summary Reports.     I have reviewed the Medications.     Review of Systems  Anesthesia Hx:  Neg history of prior surgery.   Hematology/Oncology:         -- Anemia:   Cardiovascular:   Hypertension    Pulmonary:  Pulmonary Normal    Renal/:  Renal/ Normal     Hepatic/GI:  Hepatic/GI Normal    Endocrine:  Endocrine Normal        Physical Exam  General:  Well nourished    Airway/Jaw/Neck:  Airway Findings: Mouth Opening: Normal Tongue: Normal  General Airway Assessment: Adult  Mallampati: II  TM Distance: 4 - 6 cm  Jaw/Neck Findings:  Neck ROM: Normal ROM      Dental:  Dental Findings: In tact   Chest/Lungs:  Chest/Lungs Findings: Normal Respiratory Rate     Heart/Vascular:  Heart Findings: Rate: Normal        Mental Status:  Mental Status Findings:  Cooperative         Anesthesia Plan  Type of Anesthesia, risks & benefits discussed:  Anesthesia Type:  epidural  Patient's Preference:   Intra-op Monitoring Plan: standard ASA monitors  Intra-op Monitoring Plan Comments:   Post Op Pain Control Plan:   Post Op Pain Control Plan Comments:   Induction:   IV  Beta Blocker:  Patient is not currently on a Beta-Blocker (No further documentation required).       Informed Consent: Patient understands risks and agrees with Anesthesia plan.  Questions answered. Anesthesia consent signed with patient.  ASA Score: 2     Day of Surgery Review of History & Physical:        Anesthesia Plan Notes: H/o severe pre e        Ready For Surgery From Anesthesia Perspective.

## 2017-05-09 NOTE — NURSING
"Pt resting. Appears in some discomfort. Rates abdominal cramping "5"/10. Refused pain medication at this time. Abdomen soft in between moderate contraction w/o tenderness to palpation. Denied headache, n/v, respiratory distress, vision changes, RUQ pain. 2 female friends at bedside. fht reactive.   "

## 2017-05-09 NOTE — H&P
[facility]  Obstetrics  History & Physical    Patient Name: Gurdeep Harris  MRN: [id number]  Admission Date: 2017  Primary Care Provider: Rajeev Bernal MD    Subjective:     Principal Problem:Severe pre-eclampsia in third trimester    History of Present Illness:  22 y/o  at 38 1/7 weeks by 6 week U/S seen in our office with SBP above 170 and DBP above 103. Patient also with pedal edema and looked toxic. Patient was sent to the hospital for further evaluation..  We note that here cervix was not very favorable and she was advised that if we are not able to deliver her expeditiously and/or get her blood pressures under control we might need to perform operative intervention.      Obstetric HPI:  Patient reports None contractions, active fetal movement, No vaginal bleeding , No loss of fluid     This pregnancy has been complicated by preeclampsia    Obstetric History       T0      TAB0   SAB0   E0   M0   L0       # Outcome Date GA Lbr [place]/2nd Weight Sex Delivery Anes PTL Lv   1 Current               Obstetric Comments   22y/o AA  at 38 1/7 weeks based on U/S at 5 6/7 weeks, seen in our office today with SBP greater than 170 and DBP about 103. Patient noted to have pedal edema and was sent to the hospital     Past Medical History:   Diagnosis Date    Anemia      History reviewed. No pertinent surgical history.    PTA Medications   Medication Sig    prenatal vit#103-iron-FA () 27 mg iron- 1 mg Tab Take 1 tablet by mouth once daily.       Review of patient's allergies indicates:  No Known Allergies     Family History     None        Social History Main Topics    Smoking status: Never Smoker    Smokeless tobacco: Not on file    Alcohol use No    Drug use: No    Sexual activity: Yes     Partners: Male     Birth control/ protection: None     Review of Systems   Constitutional: Positive for fatigue. Negative for activity change, appetite change, chills, fever and  unexpected weight change.   HENT: Negative for mouth sores and tinnitus.    Eyes: Negative.  Negative for discharge.   Respiratory: Negative.  Negative for cough and shortness of breath.    Cardiovascular: Positive for leg swelling. Negative for chest pain and palpitations.   Gastrointestinal: Negative for abdominal pain, bloating, blood in stool, constipation, diarrhea, nausea and vomiting.   Genitourinary: Negative.    Musculoskeletal: Negative.  Negative for back pain, joint swelling and myalgias.   Skin:  Negative for rash, no acne and hair changes.   Neurological: Negative for seizures, syncope, numbness and headaches.   Hematological: Negative.  Negative for adenopathy. Does not bruise/bleed easily.   Psychiatric/Behavioral: Negative.  Negative for depression and sleep disturbance. The patient is not nervous/anxious.    Breast: negative.    All other systems reviewed and are negative.     Objective:     Vital Signs (Most Recent):  Temp: 98.8 °F (37.1 °C) (05/08/17 1923)  Pulse: 92 (05/08/17 2033)  Resp: 20 (05/08/17 2033)  BP: (!) 161/98 (05/08/17 2016)  SpO2: 100 % (05/08/17 2033) Vital Signs (24h Range):  Temp:  [98.8 °F (37.1 °C)] 98.8 °F (37.1 °C)  Pulse:  [] 92  Resp:  [18-20] 20  SpO2:  [97 %-100 %] 100 %  BP: (133-201)/() 161/98     Weight: 77.1 kg (170 lb)  Body mass index is 31.09 kg/(m^2).    FHT: 150sCat 0 (reassuring)  TOCO:  Q 2 minutes    Physical Exam:   Constitutional: She is oriented to person, place, and time. She appears well-developed. No distress.    HENT:   Head: Normocephalic and atraumatic.   Nose: Nose normal.   Mouth/Throat: Oropharynx is clear and moist.    Eyes: EOM are normal. Pupils are equal, round, and reactive to light. Right eye exhibits no discharge. Left eye exhibits discharge.    Neck: Normal range of motion. Neck supple. No tracheal deviation present.    Cardiovascular: Normal rate, regular rhythm, normal heart sounds and intact distal pulses.  Exam reveals  edema. Exam reveals no clubbing and no cyanosis.     Pulmonary/Chest: Effort normal and breath sounds normal. No respiratory distress. She has no wheezes. She has no rales.        Abdominal: Soft. Bowel sounds are normal.     Genitourinary: Vagina normal and uterus normal.           Musculoskeletal: Normal range of motion and moves all extremeties. She exhibits no edema or tenderness.       Neurological: She is alert and oriented to person, place, and time. She has normal reflexes.    Skin: Skin is warm and dry. No rash noted. She is not diaphoretic. No cyanosis or erythema. No pallor. Nails show no clubbing.    Psychiatric: She has a normal mood and affect. Her behavior is normal. Judgment and thought content normal.       Cervix:  Dilation:  1  Effacement:  25%  Station: -3  Presentation: Vertex     Significant Labs:  Lab Results   Component Value Date    GROUPTRH O POS 2017    HEPBSAG NR 2016       CBC:   Recent Labs  Lab 17  1401   WBC 7.27   RBC 3.70*   HGB 8.4*   HCT 27.7*      MCV 75*   MCH 22.7*   MCHC 30.3*     CMP:   Recent Labs  Lab 17  1401   GLU 73   CALCIUM 8.7   ALBUMIN 2.6*   PROT 6.7      K 2.9*   CO2 25      BUN 3*   CREATININE 0.7   ALKPHOS 205*   ALT 7*   AST 16   BILITOT 0.7     I have personallly reviewed all pertinent lab results from the last 24 hours.    Assessment/Plan:     21 y.o. female  at 38w1d for:induction  Severe preeclampsia    PLAN  Attempt vaginal delivery if BP under good control  Continue Pitocin  :  No new Assessment & Plan notes have been filed under this hospital service since the last note was generated.  Service: Obstetrics and Gynecology      Rajeev Bernal MD  Obstetrics  [facility]

## 2017-05-09 NOTE — SUBJECTIVE & OBJECTIVE
Obstetric HPI:  Patient reports None contractions, active fetal movement, No vaginal bleeding , No loss of fluid     This pregnancy has been complicated by preeclampsia    Obstetric History       T0      TAB0   SAB0   E0   M0   L0       # Outcome Date GA Lbr Clemente/2nd Weight Sex Delivery Anes PTL Lv   1 Current               Obstetric Comments   20y/o AA  at 38 1/7 weeks based on U/S at 5 6/7 weeks, seen in our office today with SBP greater than 170 and DBP about 103. Patient noted to have pedal edema and was sent to the hospital     Past Medical History:   Diagnosis Date    Anemia      History reviewed. No pertinent surgical history.    PTA Medications   Medication Sig    prenatal vit#103-iron-FA () 27 mg iron- 1 mg Tab Take 1 tablet by mouth once daily.       Review of patient's allergies indicates:  No Known Allergies     Family History     None        Social History Main Topics    Smoking status: Never Smoker    Smokeless tobacco: Not on file    Alcohol use No    Drug use: No    Sexual activity: Yes     Partners: Male     Birth control/ protection: None     Review of Systems   Constitutional: Positive for fatigue. Negative for activity change, appetite change, chills, fever and unexpected weight change.   HENT: Negative for mouth sores and tinnitus.    Eyes: Negative.  Negative for discharge.   Respiratory: Negative.  Negative for cough and shortness of breath.    Cardiovascular: Positive for leg swelling. Negative for chest pain and palpitations.   Gastrointestinal: Negative for abdominal pain, bloating, blood in stool, constipation, diarrhea, nausea and vomiting.   Genitourinary: Negative.    Musculoskeletal: Negative.  Negative for back pain, joint swelling and myalgias.   Skin:  Negative for rash, no acne and hair changes.   Neurological: Negative for seizures, syncope, numbness and headaches.   Hematological: Negative.  Negative for adenopathy. Does not bruise/bleed easily.    Psychiatric/Behavioral: Negative.  Negative for depression and sleep disturbance. The patient is not nervous/anxious.    Breast: negative.    All other systems reviewed and are negative.     Objective:     Vital Signs (Most Recent):  Temp: 98.8 °F (37.1 °C) (05/08/17 1923)  Pulse: 92 (05/08/17 2033)  Resp: 20 (05/08/17 2033)  BP: (!) 161/98 (05/08/17 2016)  SpO2: 100 % (05/08/17 2033) Vital Signs (24h Range):  Temp:  [98.8 °F (37.1 °C)] 98.8 °F (37.1 °C)  Pulse:  [] 92  Resp:  [18-20] 20  SpO2:  [97 %-100 %] 100 %  BP: (133-201)/() 161/98     Weight: 77.1 kg (170 lb)  Body mass index is 31.09 kg/(m^2).    FHT: 150sCat 0 (reassuring)  TOCO:  Q 2 minutes    Physical Exam:   Constitutional: She is oriented to person, place, and time. She appears well-developed. No distress.    HENT:   Head: Normocephalic and atraumatic.   Nose: Nose normal.   Mouth/Throat: Oropharynx is clear and moist.    Eyes: EOM are normal. Pupils are equal, round, and reactive to light. Right eye exhibits no discharge. Left eye exhibits discharge.    Neck: Normal range of motion. Neck supple. No tracheal deviation present.    Cardiovascular: Normal rate, regular rhythm, normal heart sounds and intact distal pulses.  Exam reveals edema. Exam reveals no clubbing and no cyanosis.     Pulmonary/Chest: Effort normal and breath sounds normal. No respiratory distress. She has no wheezes. She has no rales.        Abdominal: Soft. Bowel sounds are normal.     Genitourinary: Vagina normal and uterus normal.           Musculoskeletal: Normal range of motion and moves all extremeties. She exhibits no edema or tenderness.       Neurological: She is alert and oriented to person, place, and time. She has normal reflexes.    Skin: Skin is warm and dry. No rash noted. She is not diaphoretic. No cyanosis or erythema. No pallor. Nails show no clubbing.    Psychiatric: She has a normal mood and affect. Her behavior is normal. Judgment and thought  content normal.       Cervix:  Dilation:  1  Effacement:  25%  Station: -3  Presentation: Vertex     Significant Labs:  Lab Results   Component Value Date    GROUPTRH O POS 05/08/2017    HEPBSAG NR 09/19/2016       CBC:   Recent Labs  Lab 05/08/17  1401   WBC 7.27   RBC 3.70*   HGB 8.4*   HCT 27.7*      MCV 75*   MCH 22.7*   MCHC 30.3*     CMP:   Recent Labs  Lab 05/08/17  1401   GLU 73   CALCIUM 8.7   ALBUMIN 2.6*   PROT 6.7      K 2.9*   CO2 25      BUN 3*   CREATININE 0.7   ALKPHOS 205*   ALT 7*   AST 16   BILITOT 0.7     I have personallly reviewed all pertinent lab results from the last 24 hours.

## 2017-05-09 NOTE — ANESTHESIA PROCEDURE NOTES
Epidural    Patient location during procedure: OB   Reason for block: primary anesthetic   Diagnosis: labor   Start time: 5/9/2017 3:11 PM  Timeout: 5/9/2017 3:11 PM  End time: 5/9/2017 3:33 PM  Staffing  Anesthesiologist: ROCHELLE WHATLEY  Performed by: anesthesiologist   Preanesthetic Checklist  Completed: patient identified, site marked, surgical consent, pre-op evaluation, timeout performed, IV checked, risks and benefits discussed, monitors and equipment checked, anesthesia consent given, hand hygiene performed and patient being monitored  Preparation  Patient position: sitting  Prep: ChloraPrep  Patient monitoring: Pulse Ox and Blood Pressure  Epidural  Skin Anesthetic: lidocaine 1%  Skin Wheal: 3 mL  Administration type: continuous  Approach: midline  Interspace: L3-4  Injection technique: CHIO air  Needle and Epidural Catheter  Needle type: Tuohy   Needle gauge: 17  Needle length: 3.5 inches  Needle insertion depth: 6 cm  Catheter type: springwound and multi-orifice  Catheter size: 19 G  Catheter at skin depth: 9 cm  Test dose: 5 mL of lidocaine 1.5% with Epi 1-to-200,000  Additional Documentation: incremental injection, no paresthesia on injection, no signs/symptoms of IV or SA injection, no significant pain on injection and no significant complaints from patient  Needle localization: anatomical landmarks  Medications:  Bolus administered: 10 mL of 0.25% bupivacaine  Volume per aspiration: 5 mL  Assessment  Upper dermatomal levels - Left: T6  Right: T6   Dermatomal levels determined by alcohol wipe  Ease of block: easy  Patient's tolerance of the procedure: comfortable throughout block and no complaints

## 2017-05-09 NOTE — NURSING
Several loud visitors in room with loud toddler.   Informed of need for decreased stimulation, noise, etc. Verbalized understanding. Denied any new complaints

## 2017-05-09 NOTE — NURSING
Pt encouraged to rest. Lights dimmed. Only 1 female visitor to stay the night. Refused pain medication at this time

## 2017-05-10 PROBLEM — D50.9 IRON DEFICIENCY ANEMIA, UNSPECIFIED: Status: ACTIVE | Noted: 2017-05-10

## 2017-05-10 LAB
BASOPHILS # BLD AUTO: 0.04 K/UL
BASOPHILS NFR BLD: 0.2 %
DIFFERENTIAL METHOD: ABNORMAL
EOSINOPHIL # BLD AUTO: 0 K/UL
EOSINOPHIL NFR BLD: 0.2 %
ERYTHROCYTE [DISTWIDTH] IN BLOOD BY AUTOMATED COUNT: 18.6 %
HCT VFR BLD AUTO: 20.7 %
HGB BLD-MCNC: 6.2 G/DL
LYMPHOCYTES # BLD AUTO: 2.1 K/UL
LYMPHOCYTES NFR BLD: 12 %
MAGNESIUM SERPL-MCNC: 4.2 MG/DL
MAGNESIUM SERPL-MCNC: 4.3 MG/DL
MAGNESIUM SERPL-MCNC: 4.3 MG/DL
MAGNESIUM SERPL-MCNC: 4.5 MG/DL
MCH RBC QN AUTO: 22.5 PG
MCHC RBC AUTO-ENTMCNC: 30 %
MCV RBC AUTO: 75 FL
MONOCYTES # BLD AUTO: 1.8 K/UL
MONOCYTES NFR BLD: 10.2 %
NEUTROPHILS # BLD AUTO: 13.4 K/UL
NEUTROPHILS NFR BLD: 77.1 %
PLATELET # BLD AUTO: 309 K/UL
PMV BLD AUTO: 9.8 FL
RBC # BLD AUTO: 2.75 M/UL
WBC # BLD AUTO: 17.4 K/UL

## 2017-05-10 PROCEDURE — 36415 COLL VENOUS BLD VENIPUNCTURE: CPT

## 2017-05-10 PROCEDURE — 51701 INSERT BLADDER CATHETER: CPT

## 2017-05-10 PROCEDURE — 11000001 HC ACUTE MED/SURG PRIVATE ROOM

## 2017-05-10 PROCEDURE — 25000003 PHARM REV CODE 250: Performed by: OBSTETRICS & GYNECOLOGY

## 2017-05-10 PROCEDURE — 83735 ASSAY OF MAGNESIUM: CPT | Mod: 91

## 2017-05-10 PROCEDURE — 63600175 PHARM REV CODE 636 W HCPCS: Performed by: OBSTETRICS & GYNECOLOGY

## 2017-05-10 PROCEDURE — 72200004 HC VAGINAL DELIVERY LEVEL I

## 2017-05-10 PROCEDURE — 51702 INSERT TEMP BLADDER CATH: CPT

## 2017-05-10 PROCEDURE — 85025 COMPLETE CBC W/AUTO DIFF WBC: CPT

## 2017-05-10 RX ORDER — NIFEDIPINE 30 MG/1
30 TABLET, EXTENDED RELEASE ORAL DAILY
Status: DISCONTINUED | OUTPATIENT
Start: 2017-05-10 | End: 2017-05-12 | Stop reason: HOSPADM

## 2017-05-10 RX ADMIN — OXYCODONE HYDROCHLORIDE AND ACETAMINOPHEN 1 TABLET: 5; 325 TABLET ORAL at 07:05

## 2017-05-10 RX ADMIN — MAGNESIUM SULFATE HEPTAHYDRATE 2 G/HR: 40 INJECTION, SOLUTION INTRAVENOUS at 06:05

## 2017-05-10 RX ADMIN — OXYCODONE HYDROCHLORIDE AND ACETAMINOPHEN 1 TABLET: 10; 325 TABLET ORAL at 02:05

## 2017-05-10 RX ADMIN — NIFEDIPINE 30 MG: 30 TABLET, FILM COATED, EXTENDED RELEASE ORAL at 09:05

## 2017-05-10 RX ADMIN — IBUPROFEN 600 MG: 600 TABLET, FILM COATED ORAL at 02:05

## 2017-05-10 RX ADMIN — IBUPROFEN 600 MG: 600 TABLET, FILM COATED ORAL at 07:05

## 2017-05-10 NOTE — L&D DELIVERY NOTE
Delivery Information for  Miriam Mackenzie     Birth information:  YOB: 2016   Time of birth: 5:30 PM   Sex: female   Head Delivery Date/Time: 2017  5:29 PM   Delivery type: Vaginal, Spontaneous Delivery   Gestational Age: 38w2d    Delivery Providers    Delivering clinician:  SIERRA DUDLEY                   [place] Assessment    Living status:  Yes   Apgars    1 Minute:   5 Minute:   10 Minute 15 Minute 20 Minute   Skin Color: 1  1       Heart Rate: 2  2       Reflex Irritability: 2  2       Muscle Tone: 2  2       Respiratory Effort: 2  2       Total: 9  9                  Apgars Assigned By:  MIKEY STEIN RN          Assisted Delivery Details:    Forceps attempted?:  No   Vacuum extractor attempted?:  Yes   Vacuum type:  Kiwi   Vacuum application location:  Low   Number of pop offs:  0   Number of pulls with vacuum:  1   Total vacuum application time:  30 seconds   Vacuum applied by:  DR. DUDLEY   Failed vacuum delivery?:  No             Shoulder Dystocia    Shoulder dystocia present?:  No                                             Presentation and Position    Presentation: Vertex   Position:                    Interventions/Resuscitation    Method:  Bulb Suctioning        Cord    Vessels:  3 vessels   Complications:  None   Delayed Cord Clamping?:  Yes   Cord Clamped Date/Time:  2017  5:31 PM   Cord Blood Disposition:  Sent with Baby   Gases Sent?:  No   Stem Cell Collection (by MD):  No        Placenta    Date and time:  2017  5:35 PM   Removal:  Expressed   Appearance:  Intact   Placenta disposition:  Discarded            Labor Events:       labor: No     Labor Onset Date/Time:         Dilation Complete Date/Time:         Start Pushing Date/Time:       Rupture Date/Time:              Rupture type:           Fluid Amount: moderate      Fluid Color: clear      Fluid Odor: none      Membrane Status (PeriCalm): ARM (Artificial Rupture)      Rupture Date/Time (PeriCalm):  2017 13:25:00      Fluid Amount (PeriCalm):        Fluid Color (PeriCalm): Clear       steroids: None     Antibiotics given for GBS: No     Induction:       Indications for induction:  Hypertension, severe preeclampsia     Augmentation: oxytocin     Indications for augmentation:       Labor complications: None     Additional complications:          Cervical ripening:                     Delivery:      Episiotomy: None     Indication for Episiotomy:       Perineal Lacerations:  midline Repaired:   Yes   Periurethral Laceration:   Repaired:     Labial Laceration:  Repaired:     Sulcus Laceration:   Repaired:     Vaginal Laceration:   Repaired:     Cervical Laceration: Yes Repaired: Yes   Repair suture:       Repair # of packets: 3     Blood loss (ml): 600     Vaginal Sweep Performed: Yes     Surgicount Correct: Yes       Other providers:       Anesthesia    Method:  Epidural              Details (if applicable):  Trial of Labor      Categorization:      Priority:     Indications for :     Incision Type:       Additional  information:  Forceps:    Vacuum:    Breech:    Observed anomalies    Other (Comments):

## 2017-05-10 NOTE — LACTATION NOTE
"This note was copied from a baby's chart.     05/10/17 1231   Maternal Infant Assessment   Breast Density Bilateral:;soft   Areola Bilateral:;dense   Nipple(s) Bilateral:;flat   Infant Assessment   Sucking Reflex present   Rooting Reflex present   Swallow Reflex present   LATCH Score   Latch 1-->repeated attempts, holds nipple in mouth, stimulate to suck   Audible Swallowing 2-->spontaneous and intermittent (24 hrs old)   Type Of Nipple 1-->flat   Comfort (Breast/Nipple) 2-->soft/nontender   Hold (Positioning) 1-->minimal assist, teach one side: mother does other, staff holds   Score (less than 7 for 2/more consecutive times, consult Lactation Consultant) 7   Maternal Infant Feeding   Maternal Emotional State relaxed;assist needed   Infant Positioning clutch/"football"   Signs of Milk Transfer audible swallow;infant jaw motion present   Time Spent (min) 0-15 min   Latch Assistance yes   Infant First Feeding   Breastfeeding Left Side (min) 10 Min   Feeding Infant   Feeding Tolerance/Success alert for feeding   Effective Latch During Feeding yes   Audible Swallow yes   Suck/Swallow Coordination present   Lactation Referrals   Lactation Consult Breastfeeding assessment;Follow up;Knowledge deficit   Lactation Interventions   Attachment Promotion breastfeeding assistance provided   Breastfeeding Assistance assisted with techniques for flat/inverted nipples;assisted with positioning;infant latch-on verified;infant suck/swallow verified   Maternal Breastfeeding Support encouragement offered;lactation counseling provided   Latch Promotion positioning assisted;infant moved to breast   Pt requests to breastfeed after recommendation of baby's nurse due to frequent emesis on formula.  Moderate assist with position and latch to left breast in football hold; audible swallows noted.  Nipples flat and deep compression needed to maintain a latch and moderate support needed to prevent sliding to nipple tip.  Basic breastfeeding " "instructions given.  Encouraged to call for assist prn.  States "understand" and verbalized appropriate recall.  "

## 2017-05-10 NOTE — ANESTHESIA POSTPROCEDURE EVALUATION
"Anesthesia Post Evaluation    Patient: Gurdeep Harris    Procedure(s) Performed: * No procedures listed *    Final Anesthesia Type: epidural  Patient location during evaluation: labor & delivery  Patient participation: Yes- Able to Participate  Level of consciousness: awake  Post-procedure vital signs: reviewed and stable  Pain management: adequate  Airway patency: patent  PONV status at discharge: No PONV  Anesthetic complications: no      Cardiovascular status: stable  Respiratory status: unassisted  Hydration status: euvolemic  Follow-up not needed.        Visit Vitals    BP (!) 172/100    Pulse 100    Temp 36.7 °C (98.1 °F) (Oral)    Resp 16    Ht 5' 2" (1.575 m)    Wt 77.1 kg (170 lb)    LMP 08/14/2016 (Approximate)    SpO2 100%    Breastfeeding No    BMI 31.09 kg/m2       Pain/Austin Score: Pain Assessment Performed: Yes (5/9/2017  5:30 PM)  Presence of Pain: complains of pain/discomfort (5/9/2017  5:30 PM)  Pain Rating Prior to Med Admin: 9 (5/9/2017  6:21 PM)  Pain Rating Post Med Admin: 0 (5/9/2017 12:47 AM)      "

## 2017-05-11 VITALS
RESPIRATION RATE: 18 BRPM | BODY MASS INDEX: 31.28 KG/M2 | WEIGHT: 170 LBS | SYSTOLIC BLOOD PRESSURE: 151 MMHG | DIASTOLIC BLOOD PRESSURE: 84 MMHG | HEART RATE: 95 BPM | TEMPERATURE: 96 F | HEIGHT: 62 IN | OXYGEN SATURATION: 100 %

## 2017-05-11 PROBLEM — O14.13 SEVERE PRE-ECLAMPSIA IN THIRD TRIMESTER: Status: RESOLVED | Noted: 2017-05-08 | Resolved: 2017-05-11

## 2017-05-11 PROBLEM — Z34.90 PREGNANCY WITH ONE FETUS: Status: RESOLVED | Noted: 2017-05-09 | Resolved: 2017-05-11

## 2017-05-11 LAB
ALBUMIN SERPL BCP-MCNC: 2.3 G/DL
ALP SERPL-CCNC: 134 U/L
ALT SERPL W/O P-5'-P-CCNC: 8 U/L
ANION GAP SERPL CALC-SCNC: 8 MMOL/L
AST SERPL-CCNC: 20 U/L
BASOPHILS # BLD AUTO: 0.03 K/UL
BASOPHILS # BLD AUTO: 0.04 K/UL
BASOPHILS NFR BLD: 0.3 %
BASOPHILS NFR BLD: 0.3 %
BILIRUB SERPL-MCNC: 0.6 MG/DL
BLD PROD TYP BPU: NORMAL
BLD PROD TYP BPU: NORMAL
BLOOD UNIT EXPIRATION DATE: NORMAL
BLOOD UNIT EXPIRATION DATE: NORMAL
BLOOD UNIT TYPE CODE: 5100
BLOOD UNIT TYPE CODE: 5100
BLOOD UNIT TYPE: NORMAL
BLOOD UNIT TYPE: NORMAL
BUN SERPL-MCNC: 5 MG/DL
CALCIUM SERPL-MCNC: 7.6 MG/DL
CHLORIDE SERPL-SCNC: 108 MMOL/L
CO2 SERPL-SCNC: 26 MMOL/L
CODING SYSTEM: NORMAL
CODING SYSTEM: NORMAL
CREAT SERPL-MCNC: 0.7 MG/DL
DIFFERENTIAL METHOD: ABNORMAL
DIFFERENTIAL METHOD: ABNORMAL
DISPENSE STATUS: NORMAL
DISPENSE STATUS: NORMAL
EOSINOPHIL # BLD AUTO: 0.5 K/UL
EOSINOPHIL # BLD AUTO: 0.7 K/UL
EOSINOPHIL NFR BLD: 4.1 %
EOSINOPHIL NFR BLD: 4.2 %
ERYTHROCYTE [DISTWIDTH] IN BLOOD BY AUTOMATED COUNT: 18.4 %
ERYTHROCYTE [DISTWIDTH] IN BLOOD BY AUTOMATED COUNT: 19.3 %
EST. GFR  (AFRICAN AMERICAN): >60 ML/MIN/1.73 M^2
EST. GFR  (NON AFRICAN AMERICAN): >60 ML/MIN/1.73 M^2
GLUCOSE SERPL-MCNC: 94 MG/DL
HCT VFR BLD AUTO: 20.6 %
HCT VFR BLD AUTO: 31.4 %
HGB BLD-MCNC: 6.1 G/DL
HGB BLD-MCNC: 9.6 G/DL
LYMPHOCYTES # BLD AUTO: 1.3 K/UL
LYMPHOCYTES # BLD AUTO: 2 K/UL
LYMPHOCYTES NFR BLD: 18.3 %
LYMPHOCYTES NFR BLD: 8.1 %
MCH RBC QN AUTO: 22.3 PG
MCH RBC QN AUTO: 23.9 PG
MCHC RBC AUTO-ENTMCNC: 29.6 %
MCHC RBC AUTO-ENTMCNC: 30.6 %
MCV RBC AUTO: 75 FL
MCV RBC AUTO: 78 FL
MONOCYTES # BLD AUTO: 0.8 K/UL
MONOCYTES # BLD AUTO: 1 K/UL
MONOCYTES NFR BLD: 5.3 %
MONOCYTES NFR BLD: 9.3 %
NEUTROPHILS # BLD AUTO: 12.7 K/UL
NEUTROPHILS # BLD AUTO: 7.4 K/UL
NEUTROPHILS NFR BLD: 68 %
NEUTROPHILS NFR BLD: 81.9 %
PLATELET # BLD AUTO: 287 K/UL
PLATELET # BLD AUTO: 335 K/UL
PMV BLD AUTO: 8.9 FL
PMV BLD AUTO: 9.4 FL
POTASSIUM SERPL-SCNC: 3 MMOL/L
PROT SERPL-MCNC: 5.5 G/DL
RBC # BLD AUTO: 2.74 M/UL
RBC # BLD AUTO: 4.02 M/UL
SODIUM SERPL-SCNC: 142 MMOL/L
TRANS ERYTHROCYTES VOL PATIENT: NORMAL ML
TRANS ERYTHROCYTES VOL PATIENT: NORMAL ML
URATE SERPL-MCNC: 6 MG/DL
WBC # BLD AUTO: 10.85 K/UL
WBC # BLD AUTO: 15.46 K/UL

## 2017-05-11 PROCEDURE — 85025 COMPLETE CBC W/AUTO DIFF WBC: CPT | Mod: 91

## 2017-05-11 PROCEDURE — 84550 ASSAY OF BLOOD/URIC ACID: CPT

## 2017-05-11 PROCEDURE — 36415 COLL VENOUS BLD VENIPUNCTURE: CPT

## 2017-05-11 PROCEDURE — P9021 RED BLOOD CELLS UNIT: HCPCS

## 2017-05-11 PROCEDURE — 25000003 PHARM REV CODE 250: Performed by: OBSTETRICS & GYNECOLOGY

## 2017-05-11 PROCEDURE — 80053 COMPREHEN METABOLIC PANEL: CPT

## 2017-05-11 PROCEDURE — 36430 TRANSFUSION BLD/BLD COMPNT: CPT | Performed by: ANESTHESIOLOGY

## 2017-05-11 RX ORDER — IBUPROFEN 600 MG/1
600 TABLET ORAL EVERY 6 HOURS
Qty: 40 TABLET | Refills: 1 | OUTPATIENT
Start: 2017-05-11 | End: 2019-01-23

## 2017-05-11 RX ORDER — HYDROCODONE BITARTRATE AND ACETAMINOPHEN 500; 5 MG/1; MG/1
TABLET ORAL
Status: DISCONTINUED | OUTPATIENT
Start: 2017-05-11 | End: 2017-05-12 | Stop reason: HOSPADM

## 2017-05-11 RX ORDER — ACETAMINOPHEN 325 MG/1
650 TABLET ORAL EVERY 6 HOURS PRN
Qty: 30 TABLET | Refills: 0 | OUTPATIENT
Start: 2017-05-11 | End: 2019-01-23

## 2017-05-11 RX ORDER — OXYCODONE AND ACETAMINOPHEN 5; 325 MG/1; MG/1
1 TABLET ORAL EVERY 6 HOURS PRN
Qty: 30 TABLET | Refills: 0 | OUTPATIENT
Start: 2017-05-11 | End: 2019-01-23

## 2017-05-11 RX ORDER — ACETAMINOPHEN 500 MG
1000 TABLET ORAL ONCE
Status: COMPLETED | OUTPATIENT
Start: 2017-05-11 | End: 2017-05-11

## 2017-05-11 RX ORDER — NIFEDIPINE 30 MG/1
30 TABLET, EXTENDED RELEASE ORAL DAILY
Qty: 30 TABLET | Refills: 6 | Status: ON HOLD | OUTPATIENT
Start: 2017-05-11 | End: 2021-04-11 | Stop reason: HOSPADM

## 2017-05-11 RX ORDER — DIPHENHYDRAMINE HCL 50 MG
50 CAPSULE ORAL ONCE
Status: COMPLETED | OUTPATIENT
Start: 2017-05-11 | End: 2017-05-11

## 2017-05-11 RX ADMIN — ACETAMINOPHEN 1000 MG: 500 TABLET ORAL at 05:05

## 2017-05-11 RX ADMIN — IBUPROFEN 600 MG: 600 TABLET, FILM COATED ORAL at 12:05

## 2017-05-11 RX ADMIN — OXYCODONE HYDROCHLORIDE AND ACETAMINOPHEN 1 TABLET: 10; 325 TABLET ORAL at 08:05

## 2017-05-11 RX ADMIN — DIPHENHYDRAMINE HYDROCHLORIDE 50 MG: 50 CAPSULE ORAL at 05:05

## 2017-05-11 RX ADMIN — OXYCODONE HYDROCHLORIDE AND ACETAMINOPHEN 1 TABLET: 10; 325 TABLET ORAL at 12:05

## 2017-05-11 RX ADMIN — IBUPROFEN 600 MG: 600 TABLET, FILM COATED ORAL at 05:05

## 2017-05-11 RX ADMIN — NIFEDIPINE 30 MG: 30 TABLET, FILM COATED, EXTENDED RELEASE ORAL at 12:05

## 2017-05-11 NOTE — NURSING
Pt seems to be very ruff when handling her new born infant, walked in the room when pt pulled baby off her breast and said she is not cooperating, then tried to bottle feed the baby and was rubbing the nipple very hard against the baby's gum. I told her to give me the baby and I would show her how, so I fed the baby some formula and showed her how to burp the baby, and explained to her if she is irritable and frustrated the baby the baby will be tense also, that she needs to have patients with the baby. I think the pt has unrealistic expectations of her new born's abilities.

## 2017-05-11 NOTE — PROGRESS NOTES
{[facility] Progress Note  Delivery Note    Gestational Age: 38w2d     Pre-Delivery Diagnosis: Term pregnancy, Induced labor, Single fetus or Pregnancy complicated by: severe preeclampsia    Post-Delivery Diagnosis: Living  infant(s) or Female    Procedure: low outlet vacuum extraction    Surgeon: Rajeev Bernal     Assistants: none    Anesthesia: Epidural anesthesia    ASA Class: 1       Episiotomy or Incision: midline 2nd degree laceration and posterior cervical laceration    Infant Wt:   6lbs,  5.1 oz.          Apgars: 1' 9  /  5' 9     Placenta and Cord:          Mechanism: spontaneous        Description:  3 vessel cord, membranes intact    Estimated Blood Loss:  600 cc           Specimens: placenta and cord blood      Recent Labs  Lab 05/10/17  0657   WBC 17.40*   RBC 2.75*   HGB 6.2*   HCT 20.7*      MCV 75*   MCH 22.5*   MCHC 30.0*     BMP:  No results for input(s): GLU, NA, K, CL, CO2, BUN, CREATININE, CALCIUM in the last 24 hours.   No results for input(s): INR, APTT in the last 24 hours.    Invalid input(s): PT             Complications:  none           Condition: stable

## 2017-05-11 NOTE — PHYSICIAN QUERY
"PT Name: Gurdeep Harris  MR #: [id number]    Physician Query Form - Hematology Clarification      CDS/: Norah Yang RN-BSN              Contact information:[phone]      This form is a permanent document in the medical record.      Query Date: May 11, 2017    By submitting this query, we are merely seeking further clarification of documentation. Please utilize your independent clinical judgment when addressing the question(s) below.    The Medical record contains the following:   Indicators  Supporting Clinical Findings Location in Medical Record   X "Anemia" documented Past Medical History: Anemia H&P 5/8   X H & H = Hgb=6.1-8.4  Hct=20.6-27.7 LAB 5/8-5/11   X BP =                     HR= VG=493-800 Flowsheet 5/10-5/11    "GI bleeding" documented     X Acute bleeding (Non GI site) Estimated Blood Loss: 600 cc OB Progress note 5/10@941pm   X Transfusion(s) Pt lightheaded and tachy upon getting up to BR. H/H 6.1/20.6. Notified Dr Bernal. Orders to prepare and transfuse 2 units noted. OB Progress note 5/11@542am    Treatment:     X Other:  Procedure: low outlet vacuum extraction  Episiotomy or Incision: midline 2nd degree laceration and posterior cervical laceration OB Progress note 5/10@941pm     Provider, please specify diagnosis or diagnoses associated with above clinical findings.    [ x ] Acute blood loss anemia  [ x ] Other Hematological Diagnosis (please specify): Iron deficiency anemia_________________________________  [  ] Clinically Undetermined       Please document in your progress notes daily for the duration of treatment, until resolved, and include in your discharge summary.                                                                                                      "

## 2017-05-11 NOTE — CONSULTS
SW met wit pt at bedside. Pt doing well. Pt will be bottlefeeding and is linked to WIC. SW inquired if pt has help at home. Pt stated that she has help at home with her Mom and Sister. SW informed pt of signs and symptom to look out for once home in order to be responsible in managing health at home. Teachback method used with pt. SW reiterated the importance of following up with appointments once discharged. OB is Gabe and Peds is [facility]. SW inquired if pt had any concerns or questions regarding infant. Pt stated that her infant is use to being held and she is not able to eat or sleep unless someone is here. SW informed mom that infant has to become adjusted to her new environment and that it takes time. 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 SW voiced understanding. SW reiterated the importance of following up with appointments. Pt voice understanding. Pt has no questions or concerns. SW will assist as needed.

## 2017-05-11 NOTE — PROGRESS NOTES
Pt lightheaded and tachy upon getting up to BR. H/H 6.1/20.6. Notified Dr Bernal. Orders to prepare and transfuse 2 units noted.

## 2017-05-11 NOTE — PLAN OF CARE
Problem: Patient Care Overview  Goal: Plan of Care Review  Outcome: Ongoing (interventions implemented as appropriate)  No longer breastfeeding, formula feeding only.

## 2017-05-11 NOTE — PLAN OF CARE
Problem: Patient Care Overview  Goal: Plan of Care Review  Outcome: Ongoing (interventions implemented as appropriate)  Labile BP. Awaiting first void post andino. Pain well managed with prn pain meds. Discussed POC, pain management, elevated BP, and labs. Pt verbalizes understanding.

## 2017-05-11 NOTE — LACTATION NOTE
"   05/11/17 0950   Maternal Infant Assessment   Breast Density Bilateral:;soft   Maternal Infant Feeding   Maternal Emotional State relaxed;independent   Time Spent (min) 0-15 min   Lactation Interventions   Maternal Breastfeeding Support encouragement offered;lactation counseling provided   Mother no longer wants to breastfeed.  Instructed on non-nursing engorgement.  Encouraged to call for assist prn.  States "understand" and verbalized appropriate recall.  "

## 2017-05-12 NOTE — DISCHARGE SUMMARY
[facility]  Obstetrics  Discharge Summary      Patient Name: Gurdeep Harris  MRN: [id number]  Admission Date: 2017  Hospital Length of Stay: 3 days  Discharge Date and Time:  2017 8:50 PM  Attending Physician: Sierra Bernal MD   Discharging Provider: Sierra Bernal MD  Primary Care Provider: Sierra Bernal MD    HPI: 22 y/o  at 38 1/7 weeks by 6 week U/S seen in our office with SBP above 170 and DBP above 103. Patient also with pedal edema and looked toxic. Patient was sent to the hospital for further evaluation..  We note that her cervix was not very favorable and she was advised that if were not able to deliver her expeditiously and/or get her blood pressures under control we might need to perform operative intervention.      VAGINAL DELIVERY with cervical laceration    Hospital Course:   05/10/2017 She is status post LOVE of viable female infant. She had 2nd degree laceration of her perineum in the midline repaired with 2-0 chromic. She also had posterior cervical laceration which was also repaired with 2-0 chromic.   She has been on MgSO4 for the past 24 hours and her most recent BP was considered normal. She has diuresised well and her pain is under good control.  We will see how she does tomorrow before deciding on her discharge.  2017  Her blood pressures are acceptable and she is anxious to be discharged home today      Consults         Status Ordering Provider     Consult to Lactation  Use PRN     Provider:  (Not yet assigned)    Acknowledged SIERRA BERNAL     Inpatient consult to Social Work  Once     Provider:  (Not yet assigned)    Completed SIERRA BERNAL          Final Active Diagnoses:    Diagnosis Date Noted POA    Iron deficiency anemia, unspecified [D50.9] 05/10/2017 Unknown      Problems Resolved During this Admission:    Diagnosis Date Noted Date Resolved POA    PRINCIPAL PROBLEM:  Severe pre-eclampsia in third trimester [O14.13]  05/08/2017 05/11/2017 Yes    Pregnancy with one fetus [Z34.90] 05/09/2017 05/11/2017 Not Applicable    Pregnancy with one fetus [Z34.90] 05/08/2017 05/09/2017 Not Applicable        Labs:   CMP   Recent Labs  Lab 05/11/17  0452      K 3.0*      CO2 26   GLU 94   BUN 5*   CREATININE 0.7   CALCIUM 7.6*   PROT 5.5*   ALBUMIN 2.3*   BILITOT 0.6   ALKPHOS 134   AST 20   ALT 8*   ANIONGAP 8   ESTGFRAFRICA >60   EGFRNONAA >60    and CBC   Recent Labs  Lab 05/10/17  0657 05/11/17  0452 05/11/17  1743   WBC 17.40* 10.85 15.46*   HGB 6.2* 6.1* 9.6*   HCT 20.7* 20.6* 31.4*    287 335       Feeding Method: both breast and bottle    Immunizations     Date Immunization Status Dose Route/Site Given by    05/09/17 1938 MMR Incomplete 0.5 mL Subcutaneous/Left deltoid     05/09/17 1938 Tdap Incomplete 0.5 mL Intramuscular/Left deltoid           Delivery:    Episiotomy: None   Lacerations:     Repair suture:     Repair # of packets: 3   Blood loss (ml): 600     Birth information:  YOB: 2016   Time of birth: 5:30 PM   Sex: female   Delivery type: Vaginal, Spontaneous Delivery   Gestational Age: 38w2d    Delivery Clinician:      Other providers:       Additional  information:  Forceps:    Vacuum:    Breech:    Observed anomalies      Living?:           APGARS  One minute Five minutes Ten minutes   Skin color:         Heart rate:         Grimace:         Muscle tone:         Breathing:         Totals: 9  9        Placenta: Delivered:       appearance    Pending Diagnostic Studies:     None          Discharged Condition: good    Disposition: Home or Self Care    Follow Up:  Follow-up Information     Follow up In 2 weeks.        Follow up In 2 weeks.        Patient Instructions:     Diet general     Activity as tolerated     Call MD for:  temperature >100.4     Call MD for:  persistent nausea and vomiting or diarrhea     Call MD for:  severe uncontrolled pain     Call MD for:  redness, tenderness, or signs  of infection (pain, swelling, redness, odor or green/yellow discharge around incision site)     Call MD for:  severe persistent headache     Call MD for:  worsening rash     Call MD for:  persistent dizziness, light-headedness, or visual disturbances     Call MD for:  increased confusion or weakness     No dressing needed       Medications:  Current Discharge Medication List      START taking these medications    Details   acetaminophen (TYLENOL) 325 MG tablet Take 2 tablets (650 mg total) by mouth every 6 (six) hours as needed.  Qty: 30 tablet, Refills: 0    Associated Diagnoses: Vaginal delivery      ibuprofen (ADVIL,MOTRIN) 600 MG tablet Take 1 tablet (600 mg total) by mouth every 6 (six) hours.  Qty: 40 tablet, Refills: 1    Associated Diagnoses: Vaginal delivery      nifedipine (PROCARDIA-XL) 30 MG (OSM) 24 hr tablet Take 1 tablet (30 mg total) by mouth once daily.  Qty: 30 tablet, Refills: 6    Associated Diagnoses: Severe pre-eclampsia in third trimester      oxycodone-acetaminophen (PERCOCET) 5-325 mg per tablet Take 1 tablet by mouth every 6 (six) hours as needed for Pain (severe post delivery pain).  Qty: 30 tablet, Refills: 0    Associated Diagnoses: Vaginal delivery         CONTINUE these medications which have NOT CHANGED    Details   prenatal vit#103-iron-FA () 27 mg iron- 1 mg Tab Take 1 tablet by mouth once daily.  Qty: 30 tablet, Refills: 12             Rajeev Bernal MD  Obstetrics  [facility]

## 2017-05-12 NOTE — NURSING
Discharge instructions given; understanding verbalized.  Pt discharged to home via wheelchair with baby in arms.

## 2017-05-12 NOTE — DISCHARGE INSTRUCTIONS
Instructed on Baby led bottle feeding.  Discussed:   Wash Hands   Hunger cues - hands to mouth, bending arms and legs toward the body, sucking noises, puckered lips and rooting/searching for the nipple   Method of feeding the baby  o always hold the baby upright, never prop a bottle  o brush the nipple across babys upper lip and wait to open  o hold bottle in a flat position, only partly full  o allow baby to pause and take breaks; burp as needed  o feeding lasts about 15 - 20 minutes  o Stop feeding when fullness cues are present  o Fullness cues - sucking slows or stops, relaxed hands and arms, pushes away, falls asleep  Pt verbalized understanding and provided appropriate recall.Infant fall prevention reviewed with mother. Mother verbalized understanding with no questions. Reviewed safety prevention facts and interventions which include the following:    *While you and your baby are in the hospital, please remember these important safety tips to keep your baby safe.     -If you are feeling weak, faint, or unsteady on your feet, DO NOT life your baby. Press the nurse call button and ask for help.  -Keep your bed in the LOWEST position (closest to the floor) at all times.  -Do NOT sleep with your baby in your bed, sofa, or chair as this may place your baby at risk of serious injury.  -When you want to sleep, first place the baby in the bassinet.  -If we find you asleep with the baby in your arms, we will move your baby to the bassinet.    *Keep your baby safe.    -Breastfeeding every 2-3 hours  -Bottle feeding every 3-4 hours  -Do NOT feed the baby in the crib with the bottle propped up. Feed the baby in your arms.  -NO heavy blankets in the crib.  -NO stuffed animals in the infants crib.  -NO balloons attached to the infant's crib.  -Keep the infant WRAPPED with their HAT ON at all times.    [facility] - [phone]      Management of Engorgement in the Non-Nursing Mother    Your breast milk will  usually come in within 3-5 days after delivery even if you have decided not to breastfeed.  Your breasts may become full, lumpy, hard and uncomfortable due to the glands filling with milk and swelling of the breast tissue, blood vessels, and lymph glands.  This is a temporary condition usually lasting 24-48 hrs.  If your breasts become uncomfortable during this time, you may use some or all of the comfort measures described below to obtain relief.    Measures to relieve discomfort:    1. Wear a well fitting supportive bra. It should not be too tight or it may lead to plugged ducts or a breast infection.  (We do not recommend that you bind your breasts with any type of wrap.)    2. If the breasts begin to feel heavy, warm or uncomfortably full, begin using cold compresses on your breasts. Cold compresses can relieve the swelling and provide comfort.  Cold application can be in the form of ice packs, gel packs, frozen bags of vegetables or frozen wet towels. Cold compresses should be wrapped in a thin towel or a pillow case and applied to the breasts for 20 minutes at a time. This process can be repeated with a short break in between the applications of cold compresses until the swelling is relieved.     3.  Cold cabbage compresses can also be used to relieve pain and swelling.  Chilled cabbage leaves show similar pain reducing effects as cold compresses.  Some mothers prefer the cabbage leaves due to a stronger, more immediate effect. Cabbage leave effects are not clinically proven but many mothers find them very soothing.    How to apply chilled cabbage compresses:  rinse with cool water and refrigerate raw cabbage leaves.  Strip the large vein off the cold cabbage leaf and put the remaining part of the cabbage leaf directly on the breast. The leaves should be worn inside the bra until they wilt, usually within 2-4 hours.  When they wilt they should be replaced with fresh leaves.   If redness, rash, or itching occur  stop use immediately.    4. Anti-inflammatory medications such as ibuprofen may be taken, with your physicians approval, to reduce inflammation and discomfort.     6. If fullness persists and remains painful even after all of the above measures are used, hand expressing a small amount of milk out of the breasts can provide an extra measure of comfort.  Warm showers, letting the warm water hit your back and roll over your shoulders to the breasts, while you gently express milk can make hand expressing a little easier. You should only remove enough milk to provide comfort and relief.   Repeat this process as often as needed to keep the breasts comfortable.    7. You can pump your breasts and remove just enough milk to feel softer, but not so much that more milk production is stimulated.   Repeat this process as often as needed to keep the breasts comfortable.    8. There is no need to limit the amount of fluids that you drink.       9. Engorgement will usually get better within 24-48 hrs; however, there may be some fullness and/or leaking of milk for several days. Wear breast pads to absorb any leaking milk and change them frequently.    10. If you have any flu-like symptoms such as fever, chills, feeling tired and achy or red areas on your breast, call your physician immediately.  11.Call the [facility], [phone], if the engorgement is not relieved or if you have questions.

## 2017-05-12 NOTE — PROGRESS NOTES
[facility] [place]  Vaginal Delivery Progress Note  Obstetrics    SUBJECTIVE:     Gurdeep Harris is a 21 y.o. female PPD #2 status post low outlet vacuum extraction at 38w2d in a pregnancy complicated by severe preeclampsia. Patient is doing  well this morning. She denies  nausea, vomiting, fever or chills. Patient reports mild abdominal pain that is adequately relieved by  oral pain medications. Lochia is mild to moderate  and decreasing. Patient is voiding without difficulty and ambulating with no difficulty. She has passed flatus and has not had a BM. Patient does plan to breast feed. She will wait for contraception.     OBJECTIVE:     Vital Signs Ranges:  Temp:  [96.4 °F (35.8 °C)-98 °F (36.7 °C)] 96.4 °F (35.8 °C)  Pulse:  [] 101  Resp:  [18-20] 20  SpO2:  [90 %-100 %] 100 %  BP: (128-152)/(77-97) 135/93    I/O (Last 24H):    Intake/Output Summary (Last 24 hours) at 05/11/17 2055  Last data filed at 05/11/17 1230   Gross per 24 hour   Intake          1077.91 ml   Output              450 ml   Net           627.91 ml       Physical Exam:  General:    alert, appears stated age and cooperative   Lungs:  clear to auscultation bilaterally   Heart:  regular rate and rhythm, S1, S2 normal, no murmur, click, rub or gallop   Abdomen:  soft, non-tender; bowel sounds normal; no masses,  no organomegaly   Uterine Size:  firm located at the umbilicus.   Incision:  none   Extremities:   peripheral pulses normal, no pedal edema, no clubbing or cyanosis     She was feeling dizzy before her transfusion but now is doing well   Lab Review:        ASSESSMENT:     Assessment:  Active Hospital Problems    Diagnosis    Iron deficiency anemia, unspecified   Severe Preeclampsia  Intrauterine Pregnancy at 38 2/7 weeks    PLAN:     Plan:  1. Postpartum care:   - Patient doing well. Continue routine management and advances.   - Continue PO pain meds. Pain well controlled.  2. She received 2 units of packed  RBCs       Recent Labs  Lab 05/11/17  1743   WBC 15.46*   RBC 4.02   HGB 9.6*   HCT 31.4*      MCV 78*   MCH 23.9*   MCHC 30.6*     *   - Encourage ambulation     -  Disposition: As patient meets milestones, will plan to discharge 05/11/2017.

## 2018-01-24 ENCOUNTER — HOSPITAL ENCOUNTER (EMERGENCY)
Facility: OTHER | Age: 23
Discharge: HOME | End: 2018-01-24
Attending: EMERGENCY MEDICINE
Payer: MEDICAID

## 2018-01-24 VITALS
SYSTOLIC BLOOD PRESSURE: 133 MMHG | HEART RATE: 65 BPM | WEIGHT: 150 LBS | RESPIRATION RATE: 14 BRPM | TEMPERATURE: 98 F | OXYGEN SATURATION: 100 % | HEIGHT: 62 IN | BODY MASS INDEX: 27.6 KG/M2 | DIASTOLIC BLOOD PRESSURE: 87 MMHG

## 2018-01-24 DIAGNOSIS — B96.89 BACTERIAL VAGINOSIS: Primary | ICD-10-CM

## 2018-01-24 DIAGNOSIS — N76.0 BACTERIAL VAGINOSIS: Primary | ICD-10-CM

## 2018-01-24 LAB
B-HCG UR QL: NEGATIVE
BACTERIA GENITAL QL WET PREP: ABNORMAL
BILIRUB UR QL STRIP: NEGATIVE
CLARITY UR: CLEAR
CLUE CELLS VAG QL WET PREP: ABNORMAL
COLOR UR: YELLOW
CTP QC/QA: YES
FILAMENT FUNGI VAG WET PREP-#/AREA: ABNORMAL
GLUCOSE UR QL STRIP: NEGATIVE
HGB UR QL STRIP: NEGATIVE
KETONES UR QL STRIP: NEGATIVE
LEUKOCYTE ESTERASE UR QL STRIP: NEGATIVE
NITRITE UR QL STRIP: NEGATIVE
PH UR STRIP: 6 [PH] (ref 5–8)
PROT UR QL STRIP: ABNORMAL
SP GR UR STRIP: 1.02 (ref 1–1.03)
SPECIMEN SOURCE: ABNORMAL
T VAGINALIS GENITAL QL WET PREP: ABNORMAL
URN SPEC COLLECT METH UR: ABNORMAL
UROBILINOGEN UR STRIP-ACNC: 1 EU/DL
WBC #/AREA VAG WET PREP: ABNORMAL
YEAST GENITAL QL WET PREP: ABNORMAL

## 2018-01-24 PROCEDURE — 81025 URINE PREGNANCY TEST: CPT | Performed by: EMERGENCY MEDICINE

## 2018-01-24 PROCEDURE — 87210 SMEAR WET MOUNT SALINE/INK: CPT

## 2018-01-24 PROCEDURE — 99284 EMERGENCY DEPT VISIT MOD MDM: CPT | Mod: 25

## 2018-01-24 PROCEDURE — 81003 URINALYSIS AUTO W/O SCOPE: CPT

## 2018-01-24 PROCEDURE — 87491 CHLMYD TRACH DNA AMP PROBE: CPT

## 2018-01-24 RX ORDER — METRONIDAZOLE 500 MG/1
500 TABLET ORAL 2 TIMES DAILY
Qty: 14 TABLET | Refills: 0 | Status: SHIPPED | OUTPATIENT
Start: 2018-01-24 | End: 2018-01-24

## 2018-01-24 RX ORDER — METRONIDAZOLE 500 MG/1
500 TABLET ORAL 2 TIMES DAILY
Qty: 14 TABLET | Refills: 0 | Status: SHIPPED | OUTPATIENT
Start: 2018-01-24 | End: 2018-01-31

## 2018-01-24 NOTE — ED PROVIDER NOTES
"Encounter Date: 1/24/2018       History     Chief Complaint   Patient presents with    Dysuria     Foul smelling vaginal discharge, clear and burning with urination and low abd pain x 1 week. Denies n/v/d.     Vaginal Discharge     22-year-old female with history of anemia presents emergency department with complaints of vaginal discharge and dysuria with lower abdominal pain for 1 week.  She denies nausea, vomiting, diarrhea, fever, chills.  She states that she has not been sexually active in 1 year, admitting that her "baby daddy is in prison."  She complains of pain that is an 8 out of 10.  No current treatment      The history is provided by the patient.     Review of patient's allergies indicates:  No Known Allergies  Past Medical History:   Diagnosis Date    Anemia      No past surgical history on file.  Family History   Problem Relation Age of Onset    Breast cancer Neg Hx     Colon cancer Neg Hx     Ovarian cancer Neg Hx      Social History   Substance Use Topics    Smoking status: Never Smoker    Smokeless tobacco: Not on file    Alcohol use No     Review of Systems   Constitutional: Negative for chills and fever.   HENT: Negative for sore throat.    Respiratory: Negative for shortness of breath.    Cardiovascular: Negative for chest pain.   Gastrointestinal: Positive for abdominal pain. Negative for constipation, diarrhea, nausea and vomiting.   Genitourinary: Positive for dysuria and vaginal discharge. Negative for difficulty urinating, flank pain, frequency, hematuria, urgency and vaginal bleeding.   Musculoskeletal: Negative for back pain, neck pain and neck stiffness.   Skin: Negative for rash.   Neurological: Negative for weakness.   Hematological: Does not bruise/bleed easily.       Physical Exam     Initial Vitals [01/24/18 1053]   BP Pulse Resp Temp SpO2   (!) 140/95 72 16 97.9 °F (36.6 °C) 100 %      MAP       110         Physical Exam    Nursing note and vitals reviewed.  Constitutional: " She appears well-developed and well-nourished. She is not diaphoretic.  Non-toxic appearance. No distress.   HENT:   Head: Normocephalic and atraumatic.   Right Ear: External ear normal.   Left Ear: External ear normal.   Nose: Nose normal.   Eyes: Conjunctivae and lids are normal. No scleral icterus.   Neck: Normal range of motion and phonation normal. Neck supple.   Abdominal: Soft. Normal appearance and bowel sounds are normal. She exhibits no distension. There is no tenderness. There is no rigidity, no rebound, no guarding, no CVA tenderness, no tenderness at McBurney's point and negative Pena's sign.   Musculoskeletal: Normal range of motion.   No obvious deformities, moving all extremities, normal gait   Neurological: She is alert and oriented to person, place, and time. She has normal strength. No sensory deficit.   Skin: Skin is warm, dry and intact. No lesion and no rash noted. No erythema.   Psychiatric: She has a normal mood and affect. Her speech is normal and behavior is normal. Judgment normal. Cognition and memory are normal.         ED Course   Procedures  Labs Reviewed   URINALYSIS - Abnormal; Notable for the following:        Result Value    Protein, UA Trace (*)     All other components within normal limits   VAGINAL SCREEN - Abnormal; Notable for the following:     Clue Cells, Wet Prep Occasional (*)     WBC - Vaginal Screen Occasional (*)     Bacteria - Vaginal Screen Few (*)     All other components within normal limits   C. TRACHOMATIS/N. GONORRHOEAE BY AMP DNA   POCT URINE PREGNANCY             Medical Decision Making:   History:   Old Medical Records: I decided to obtain old medical records.  Initial Assessment:   Male with complaints consistent with vaginal discharge concerning for bacterial vaginosis.  Vital signs stable, afebrile, neurovascular intact.  She is alert and healthy and nontoxic appearing.  She is in no apparent distress.  Scant discharge noted on exam.  No CMT or adnexal  tenderness palpation.  No uterine tenderness palpation.  No CVA tenderness palpation.  Clinical Tests:   Lab Tests: Ordered and Reviewed  ED Management:  Urinalysis with no evidence of serious bacterial infection, UTI pyelonephritis.  Vaginal screen concerning for possible bacterial vaginosis with occasional clue cell.  Do not suspect PID or tubo-ovarian abscess as patient is not currently sexually active since having her child.  As patient is symptomatic with discharged home with prescription for Flagyl.  She is to follow-up with her doctor in the next 48 hours or return for any worsening signs or symptoms.  She states understanding.  This patient was discussed with the attending physician who agrees with treatment plan.  Other:   I have discussed this case with another health care provider.       <> Summary of the Discussion: Shantal  This note was created using Dragon Medical dictation.  There may be typographical errors secondary to dictation.                     ED Course      Clinical Impression:     1. Bacterial vaginosis          Disposition:   Disposition: Discharged  Condition: Stable                        Saloni Goldman PA-C  01/24/18 4685

## 2018-01-25 LAB
C TRACH DNA SPEC QL NAA+PROBE: NOT DETECTED
N GONORRHOEA DNA SPEC QL NAA+PROBE: NOT DETECTED

## 2019-01-23 ENCOUNTER — HOSPITAL ENCOUNTER (EMERGENCY)
Facility: OTHER | Age: 24
Discharge: HOME | End: 2019-01-23
Attending: EMERGENCY MEDICINE
Payer: MEDICAID

## 2019-01-23 VITALS
BODY MASS INDEX: 27.6 KG/M2 | RESPIRATION RATE: 16 BRPM | WEIGHT: 150 LBS | SYSTOLIC BLOOD PRESSURE: 111 MMHG | TEMPERATURE: 98 F | HEART RATE: 80 BPM | OXYGEN SATURATION: 100 % | DIASTOLIC BLOOD PRESSURE: 66 MMHG | HEIGHT: 62 IN

## 2019-01-23 DIAGNOSIS — B96.89 BACTERIAL VAGINOSIS: Primary | ICD-10-CM

## 2019-01-23 DIAGNOSIS — N76.0 BACTERIAL VAGINOSIS: Primary | ICD-10-CM

## 2019-01-23 LAB
B-HCG UR QL: NEGATIVE
BACTERIA GENITAL QL WET PREP: ABNORMAL
BILIRUB UR QL STRIP: NEGATIVE
CLARITY UR: CLEAR
CLUE CELLS VAG QL WET PREP: ABNORMAL
COLOR UR: YELLOW
CTP QC/QA: YES
FILAMENT FUNGI VAG WET PREP-#/AREA: ABNORMAL
GLUCOSE UR QL STRIP: NEGATIVE
HGB UR QL STRIP: NEGATIVE
KETONES UR QL STRIP: NEGATIVE
LEUKOCYTE ESTERASE UR QL STRIP: NEGATIVE
NITRITE UR QL STRIP: NEGATIVE
PH UR STRIP: 6 [PH] (ref 5–8)
PROT UR QL STRIP: ABNORMAL
SP GR UR STRIP: 1.02 (ref 1–1.03)
SPECIMEN SOURCE: ABNORMAL
T VAGINALIS GENITAL QL WET PREP: ABNORMAL
URN SPEC COLLECT METH UR: ABNORMAL
UROBILINOGEN UR STRIP-ACNC: ABNORMAL EU/DL
WBC #/AREA VAG WET PREP: ABNORMAL
YEAST GENITAL QL WET PREP: ABNORMAL

## 2019-01-23 PROCEDURE — 81003 URINALYSIS AUTO W/O SCOPE: CPT

## 2019-01-23 PROCEDURE — 87491 CHLMYD TRACH DNA AMP PROBE: CPT

## 2019-01-23 PROCEDURE — 87210 SMEAR WET MOUNT SALINE/INK: CPT

## 2019-01-23 PROCEDURE — 81025 URINE PREGNANCY TEST: CPT | Performed by: EMERGENCY MEDICINE

## 2019-01-23 PROCEDURE — 99284 EMERGENCY DEPT VISIT MOD MDM: CPT

## 2019-01-23 RX ORDER — METRONIDAZOLE 500 MG/1
500 TABLET ORAL 3 TIMES DAILY
Qty: 15 TABLET | Refills: 0 | Status: SHIPPED | OUTPATIENT
Start: 2019-01-23 | End: 2019-01-28

## 2019-01-23 NOTE — ED NOTES
Pt to er with crampy  lower abdominal pain x 2 days. Pt aaox3 skin warm and dry . Pt denies nausea.vomiting, diarrhea. Pt with mild vaginal discharge , pt states constipated x few days. Pt states no pain on urination. Pt abdomin soft not tender positive bowel sounds. Pt ambulated to room without difficulty.

## 2019-01-23 NOTE — ED PROVIDER NOTES
Encounter Date: 1/23/2019       History     Chief Complaint   Patient presents with    Abdominal Pain     Pt reports lower abd pain for the past week. Pt denies any urinary s/s, vag discharge or N/V/D.      23-year-old female presents with lower abdominal discomfort for the past 2 weeks.  It is a crampy pain that comes and goes.  She denies any fever chills.  There is no change in her appetite.  There is no vomiting. No vaginal symptoms.  No dysuria.  The last time she had this she had bacterial vaginosis.  She tried to call her gyn for appointment but they did not have availability in December January or February.  When I asked about the December appointment she said maybe the pain has been going on for longer than 2 weeks.          Review of patient's allergies indicates:  No Known Allergies  Past Medical History:   Diagnosis Date    Anemia      History reviewed. No pertinent surgical history.  Family History   Problem Relation Age of Onset    Breast cancer Neg Hx     Colon cancer Neg Hx     Ovarian cancer Neg Hx      Social History     Tobacco Use    Smoking status: Never Smoker   Substance Use Topics    Alcohol use: No    Drug use: No     Review of Systems   Constitutional: Negative for chills and fever.   HENT: Negative for congestion.    Eyes: Negative for visual disturbance.   Respiratory: Negative for chest tightness and shortness of breath.    Cardiovascular: Negative for chest pain.   Gastrointestinal: Negative for abdominal distention, anal bleeding, blood in stool, constipation, diarrhea, nausea, rectal pain and vomiting.   Endocrine: Negative for cold intolerance and heat intolerance.   Genitourinary: Negative for decreased urine volume, difficulty urinating, frequency, urgency, vaginal bleeding, vaginal discharge and vaginal pain.   Musculoskeletal: Negative for back pain.   Neurological: Negative for dizziness.   Hematological: Negative for adenopathy.   Psychiatric/Behavioral: Negative for  agitation.       Physical Exam     Initial Vitals [01/23/19 1522]   BP Pulse Resp Temp SpO2   113/72 87 18 97.8 °F (36.6 °C) 100 %      MAP       --         Physical Exam    Vitals reviewed.  Constitutional: She appears well-developed and well-nourished.   HENT:   Head: Normocephalic.   Eyes: Pupils are equal, round, and reactive to light.   Neck: Normal range of motion. Neck supple.   Cardiovascular: Normal rate and regular rhythm.   Pulmonary/Chest: Breath sounds normal.   Abdominal: Soft. Bowel sounds are normal. She exhibits no distension and no mass. There is no tenderness. There is no rebound and no guarding.   Genitourinary:   Genitourinary Comments: On pelvic exam, patient has no lesions to the cervix.  She has a mild vaginal discharge. There is no cervical motion tenderness.  There is no adnexal masses or tenderness   Musculoskeletal: Normal range of motion. She exhibits no edema.   Skin: Skin is warm. Capillary refill takes less than 2 seconds.         ED Course   Procedures  Labs Reviewed   URINALYSIS, REFLEX TO URINE CULTURE - Abnormal; Notable for the following components:       Result Value    Protein, UA Trace (*)     Urobilinogen, UA 2.0-3.0 (*)     All other components within normal limits    Narrative:     Preferred Collection Type->Urine, Clean Catch   VAGINAL SCREEN - Abnormal; Notable for the following components:    Clue Cells, Wet Prep Occasional (*)     WBC - Vaginal Screen Occasional (*)     Bacteria - Vaginal Screen Few (*)     All other components within normal limits   C. TRACHOMATIS/N. GONORRHOEAE BY AMP DNA   POCT URINE PREGNANCY          Imaging Results    None          Medical Decision Making:   Initial Assessment:   Patient with several weeks of lower abdominal discomfort.  She is comfortable now.  There is no tenderness on a detailed abdominal exam.  Will check urinalysis, pregnancy test, pelvic exam.  I highly doubt this is appendicitis or surgical emergency.  ED Management:  I  reviewed wet prep results.  She has some signs of bacterial vaginosis.  Will treat with Flagyl and recommend she follow up with her gyn    Repeated dominant exam is benign without tenderness                      Clinical Impression:   The encounter diagnosis was Bacterial vaginosis.      Disposition:   Disposition: Discharged  Condition: Stable                        Josias Subramanian MD  01/23/19 5183

## 2019-01-24 LAB
C TRACH DNA SPEC QL NAA+PROBE: NOT DETECTED
N GONORRHOEA DNA SPEC QL NAA+PROBE: NOT DETECTED

## 2019-06-05 ENCOUNTER — HOSPITAL ENCOUNTER (EMERGENCY)
Facility: OTHER | Age: 24
Discharge: HOME | End: 2019-06-05
Attending: EMERGENCY MEDICINE
Payer: MEDICAID

## 2019-06-05 VITALS
WEIGHT: 149 LBS | DIASTOLIC BLOOD PRESSURE: 73 MMHG | HEIGHT: 62 IN | HEART RATE: 72 BPM | RESPIRATION RATE: 18 BRPM | SYSTOLIC BLOOD PRESSURE: 120 MMHG | OXYGEN SATURATION: 100 % | BODY MASS INDEX: 27.42 KG/M2 | TEMPERATURE: 98 F

## 2019-06-05 DIAGNOSIS — N76.0 BACTERIAL VAGINOSIS: Primary | ICD-10-CM

## 2019-06-05 DIAGNOSIS — B96.89 BACTERIAL VAGINOSIS: Primary | ICD-10-CM

## 2019-06-05 LAB
B-HCG UR QL: NEGATIVE
BACTERIA GENITAL QL WET PREP: ABNORMAL
BILIRUB UR QL STRIP: NEGATIVE
CLARITY UR: ABNORMAL
CLUE CELLS VAG QL WET PREP: ABNORMAL
COLOR UR: YELLOW
CTP QC/QA: YES
FILAMENT FUNGI VAG WET PREP-#/AREA: ABNORMAL
GLUCOSE UR QL STRIP: NEGATIVE
HGB UR QL STRIP: NEGATIVE
KETONES UR QL STRIP: NEGATIVE
LEUKOCYTE ESTERASE UR QL STRIP: NEGATIVE
NITRITE UR QL STRIP: NEGATIVE
PH UR STRIP: 7 [PH] (ref 5–8)
PROT UR QL STRIP: ABNORMAL
SP GR UR STRIP: 1.02 (ref 1–1.03)
SPECIMEN SOURCE: ABNORMAL
T VAGINALIS GENITAL QL WET PREP: ABNORMAL
URN SPEC COLLECT METH UR: ABNORMAL
UROBILINOGEN UR STRIP-ACNC: ABNORMAL EU/DL
WBC #/AREA VAG WET PREP: ABNORMAL
YEAST GENITAL QL WET PREP: ABNORMAL

## 2019-06-05 PROCEDURE — 81025 URINE PREGNANCY TEST: CPT | Performed by: EMERGENCY MEDICINE

## 2019-06-05 PROCEDURE — 81003 URINALYSIS AUTO W/O SCOPE: CPT

## 2019-06-05 PROCEDURE — 87491 CHLMYD TRACH DNA AMP PROBE: CPT

## 2019-06-05 PROCEDURE — 25000003 PHARM REV CODE 250: Performed by: PHYSICIAN ASSISTANT

## 2019-06-05 PROCEDURE — 99284 EMERGENCY DEPT VISIT MOD MDM: CPT

## 2019-06-05 PROCEDURE — 87210 SMEAR WET MOUNT SALINE/INK: CPT

## 2019-06-05 RX ORDER — NAPROXEN 500 MG/1
500 TABLET ORAL EVERY 12 HOURS PRN
Qty: 10 TABLET | Refills: 0 | Status: ON HOLD | OUTPATIENT
Start: 2019-06-05 | End: 2021-04-11 | Stop reason: HOSPADM

## 2019-06-05 RX ORDER — NAPROXEN 500 MG/1
500 TABLET ORAL
Status: COMPLETED | OUTPATIENT
Start: 2019-06-05 | End: 2019-06-05

## 2019-06-05 RX ORDER — METRONIDAZOLE 500 MG/1
500 TABLET ORAL EVERY 12 HOURS
Qty: 14 TABLET | Refills: 0 | Status: SHIPPED | OUTPATIENT
Start: 2019-06-05 | End: 2019-06-12

## 2019-06-05 RX ADMIN — NAPROXEN 500 MG: 500 TABLET ORAL at 02:06

## 2019-06-05 NOTE — ED PROVIDER NOTES
Encounter Date: 6/5/2019       History     Chief Complaint   Patient presents with    Vaginal Discharge     Pt reports vag discharge with lower abd pain for the past three days.      Patient is a 23-year-old female with anemia who presents to the ED with vaginal discharge and pelvic pain.  Patient reports onset of symptoms 2-3 days ago.  She she describes her pain as cramping, intermittent, and located to the entire lower pelvis.  She reports malodorous, yellow tinged discharge. She states she has had the symptoms in the past when she was diagnosed with bacterial vaginosis.  She states she frequently uses soap to her vagina.  She states she has not been sexually active in greater than 6 months.  She denies urinary symptoms. She denies nausea, vomiting, or changes in bowel movements.    The history is provided by the patient.     Review of patient's allergies indicates:  No Known Allergies  Past Medical History:   Diagnosis Date    Anemia      No past surgical history on file.  Family History   Problem Relation Age of Onset    Breast cancer Neg Hx     Colon cancer Neg Hx     Ovarian cancer Neg Hx      Social History     Tobacco Use    Smoking status: Never Smoker   Substance Use Topics    Alcohol use: No    Drug use: No     Review of Systems   Constitutional: Negative for chills and fever.   Cardiovascular: Negative for chest pain.   Gastrointestinal: Negative for diarrhea, nausea and vomiting.   Genitourinary: Positive for pelvic pain and vaginal discharge. Negative for dysuria.   Skin: Negative for rash.       Physical Exam     Initial Vitals [06/05/19 1328]   BP Pulse Resp Temp SpO2   127/81 85 18 98.9 °F (37.2 °C) 100 %      MAP       --         Physical Exam    Constitutional: Vital signs are normal. She is cooperative. No distress.   Eyes: EOM are normal. Pupils are equal, round, and reactive to light.   Neck: Normal range of motion. Neck supple.   Cardiovascular: Normal rate, regular rhythm and intact  distal pulses.   Abdominal: Soft. Bowel sounds are normal. There is no tenderness. There is no rebound and no guarding.   Genitourinary:   Genitourinary Comments: Normal appearance of the external genitalia, no skin lesions, erythema or masses. Pink vaginal mucosa. Cervix pink with no erythema. Scant amount of clear nonmalodorous discharge. Cervical os is closed. No CMT, adnexal tenderness.    Musculoskeletal: Normal range of motion.   Neurological: She is alert and oriented to person, place, and time. GCS eye subscore is 4. GCS verbal subscore is 5. GCS motor subscore is 6.   Skin: Skin is warm and dry. No rash noted.   Psychiatric: She has a normal mood and affect. Her behavior is normal.         ED Course   Procedures  Labs Reviewed   VAGINAL SCREEN - Abnormal; Notable for the following components:       Result Value    Clue Cells Occasional (*)     WBC - Vaginal Screen Few (*)     Bacteria - Vaginal Screen Moderate (*)     All other components within normal limits   C. TRACHOMATIS/N. GONORRHOEAE BY AMP DNA   URINALYSIS, REFLEX TO URINE CULTURE   POCT URINE PREGNANCY          Imaging Results    None          Medical Decision Making:   Initial Assessment:   Urgent evaluation of a 23 y.o. female presenting to the emergency department complaining of vaginal discharge and pelvic pain X 2 days. Patient is afebrile, nontoxic appearing and hemodynamically stable.  Patient reports similar symptoms when she had bacterial vaginosis.  No suspicion for STI, as patient is adamant she is not sexually active.  I do not suspect PID.  ED Management:  Wet prep reveals clue cells- will treat with Flagyl. Patient advised on symptomatic care. She states she has appt with her OBGYN .  She has no other complaints today and is stable for D/C.                       Clinical Impression:     1. Bacterial vaginosis                               Yo Charles PA-C  06/05/19 1500

## 2019-06-05 NOTE — ED TRIAGE NOTES
Pt arrived to ED with c/o mid lower pelvic pain x3 days associated with yellowish discharge. Pt reports hx of BV, denies unprotected sex. Pt denies fever, chills, n/v/d, dysuria

## 2019-06-06 LAB
C TRACH DNA SPEC QL NAA+PROBE: NOT DETECTED
N GONORRHOEA DNA SPEC QL NAA+PROBE: NOT DETECTED

## 2019-07-26 ENCOUNTER — OCCUPATIONAL HEALTH (OUTPATIENT)
Dept: URGENT CARE | Facility: CLINIC | Age: 24
End: 2019-07-26

## 2019-07-26 DIAGNOSIS — Z02.1 PRE-EMPLOYMENT EXAMINATION: Primary | ICD-10-CM

## 2019-07-26 PROCEDURE — 82270 OCCULT BLOOD FECES: CPT | Mod: S$GLB,,, | Performed by: NURSE PRACTITIONER

## 2019-07-26 PROCEDURE — 86593 SYPHILIS TEST NON-TREP QUANT: CPT | Mod: S$GLB,,, | Performed by: NURSE PRACTITIONER

## 2019-07-26 PROCEDURE — 89240 OOH PANEL 3 (CMP, CBCW/DIFF, MUA, LIPID): ICD-10-PCS | Mod: S$GLB,,, | Performed by: NURSE PRACTITIONER

## 2019-07-26 PROCEDURE — 86593 PR  SYPHILIS TEST, QUANTITATIVE: ICD-10-PCS | Mod: S$GLB,,, | Performed by: NURSE PRACTITIONER

## 2019-07-26 PROCEDURE — 86580 TB INTRADERMAL TEST: CPT | Mod: S$GLB,,, | Performed by: NURSE PRACTITIONER

## 2019-07-26 PROCEDURE — 86703 HIV 1 / 2 ANTIBODY: ICD-10-PCS | Mod: S$GLB,,, | Performed by: NURSE PRACTITIONER

## 2019-07-26 PROCEDURE — 92552 PURE TONE AUDIOMETRY AIR: CPT | Mod: S$GLB,,, | Performed by: NURSE PRACTITIONER

## 2019-07-26 PROCEDURE — 99499 UNLISTED E&M SERVICE: CPT | Mod: S$GLB,,, | Performed by: NURSE PRACTITIONER

## 2019-07-26 PROCEDURE — 80305 OOH NON-DOT DRUG SCREEN: ICD-10-PCS | Mod: S$GLB,,, | Performed by: NURSE PRACTITIONER

## 2019-07-26 PROCEDURE — 80305 DRUG TEST PRSMV DIR OPT OBS: CPT | Mod: S$GLB,,, | Performed by: NURSE PRACTITIONER

## 2019-07-26 PROCEDURE — 86580 POCT TB SKIN TEST: ICD-10-PCS | Mod: S$GLB,,, | Performed by: NURSE PRACTITIONER

## 2019-07-26 PROCEDURE — 92552 AUDIOGRAM OCC MED: ICD-10-PCS | Mod: S$GLB,,, | Performed by: NURSE PRACTITIONER

## 2019-07-26 PROCEDURE — 82270 POCT OCCULT BLOOD STOOL: ICD-10-PCS | Mod: S$GLB,,, | Performed by: NURSE PRACTITIONER

## 2019-07-26 PROCEDURE — 99499 PHYSICAL, BASIC COMPLEXITY: ICD-10-PCS | Mod: S$GLB,,, | Performed by: NURSE PRACTITIONER

## 2019-07-26 PROCEDURE — 86703 HIV-1/HIV-2 1 RESULT ANTBDY: CPT | Mod: S$GLB,,, | Performed by: NURSE PRACTITIONER

## 2019-07-26 PROCEDURE — 89240 UNLISTED MISC PATH TEST: CPT | Mod: S$GLB,,, | Performed by: NURSE PRACTITIONER

## 2019-07-27 LAB
ALBUMIN SERPL-MCNC: 4.7 G/DL (ref 3.5–5.5)
ALBUMIN/GLOB SERPL: 1.7 {RATIO} (ref 1.2–2.2)
ALP SERPL-CCNC: 65 IU/L (ref 39–117)
ALT SERPL-CCNC: 8 IU/L (ref 0–32)
APPEARANCE UR: CLEAR
AST SERPL-CCNC: 15 IU/L (ref 0–40)
BACTERIA #/AREA URNS HPF: ABNORMAL /[HPF]
BASOPHILS # BLD AUTO: 0 X10E3/UL (ref 0–0.2)
BASOPHILS NFR BLD AUTO: 1 %
BILIRUB SERPL-MCNC: 0.3 MG/DL (ref 0–1.2)
BILIRUB UR QL STRIP: NEGATIVE
BUN SERPL-MCNC: 9 MG/DL (ref 6–20)
BUN/CREAT SERPL: 12 (ref 9–23)
CALCIUM SERPL-MCNC: 9.3 MG/DL (ref 8.7–10.2)
CHLORIDE SERPL-SCNC: 105 MMOL/L (ref 96–106)
CHOLEST SERPL-MCNC: 178 MG/DL (ref 100–199)
CO2 SERPL-SCNC: 21 MMOL/L (ref 20–29)
COLOR UR: YELLOW
CREAT SERPL-MCNC: 0.75 MG/DL (ref 0.57–1)
EOSINOPHIL # BLD AUTO: 0.3 X10E3/UL (ref 0–0.4)
EOSINOPHIL NFR BLD AUTO: 4 %
EPI CELLS #/AREA URNS HPF: ABNORMAL /HPF (ref 0–10)
ERYTHROCYTE [DISTWIDTH] IN BLOOD BY AUTOMATED COUNT: 17.4 % (ref 12.3–15.4)
GLOBULIN SER CALC-MCNC: 2.7 G/DL (ref 1.5–4.5)
GLUCOSE SERPL-MCNC: 86 MG/DL (ref 65–99)
GLUCOSE UR QL: NEGATIVE
HCT VFR BLD AUTO: 35.1 % (ref 34–46.6)
HDLC SERPL-MCNC: 55 MG/DL
HGB BLD-MCNC: 10.4 G/DL (ref 11.1–15.9)
HGB UR QL STRIP: ABNORMAL
HIV 1+2 AB+HIV1 P24 AG SERPL QL IA: NON REACTIVE
IMM GRANULOCYTES # BLD AUTO: 0 X10E3/UL (ref 0–0.1)
IMM GRANULOCYTES NFR BLD AUTO: 0 %
KETONES UR QL STRIP: NEGATIVE
LDLC SERPL CALC-MCNC: 107 MG/DL (ref 0–99)
LEUKOCYTE ESTERASE UR QL STRIP: ABNORMAL
LYMPHOCYTES # BLD AUTO: 2.3 X10E3/UL (ref 0.7–3.1)
LYMPHOCYTES NFR BLD AUTO: 34 %
MCH RBC QN AUTO: 23 PG (ref 26.6–33)
MCHC RBC AUTO-ENTMCNC: 29.6 G/DL (ref 31.5–35.7)
MCV RBC AUTO: 78 FL (ref 79–97)
MICRO URNS: ABNORMAL
MONOCYTES # BLD AUTO: 0.5 X10E3/UL (ref 0.1–0.9)
MONOCYTES NFR BLD AUTO: 7 %
MUCOUS THREADS URNS QL MICRO: PRESENT
NEUTROPHILS # BLD AUTO: 3.6 X10E3/UL (ref 1.4–7)
NEUTROPHILS NFR BLD AUTO: 54 %
NITRITE UR QL STRIP: NEGATIVE
PH UR STRIP: 6.5 [PH] (ref 5–7.5)
PLATELET # BLD AUTO: 391 X10E3/UL (ref 150–450)
POTASSIUM SERPL-SCNC: 3.4 MMOL/L (ref 3.5–5.2)
PROT SERPL-MCNC: 7.4 G/DL (ref 6–8.5)
PROT UR QL STRIP: NEGATIVE
RBC # BLD AUTO: 4.52 X10E6/UL (ref 3.77–5.28)
RBC #/AREA URNS HPF: ABNORMAL /HPF (ref 0–2)
RPR SER QL: NON REACTIVE
SODIUM SERPL-SCNC: 140 MMOL/L (ref 134–144)
SP GR UR: 1.01 (ref 1–1.03)
TRIGL SERPL-MCNC: 81 MG/DL (ref 0–149)
UROBILINOGEN UR STRIP-MCNC: 0.2 MG/DL (ref 0.2–1)
VLDLC SERPL CALC-MCNC: 16 MG/DL (ref 5–40)
WBC # BLD AUTO: 6.7 X10E3/UL (ref 3.4–10.8)
WBC #/AREA URNS HPF: ABNORMAL /HPF (ref 0–5)

## 2019-07-28 LAB
TB INDURATION - 48 HR READ: 10 MM
TB INDURATION - 72 HR READ: NORMAL MM
TB SKIN TEST - 48 HR READ: NEGATIVE
TB SKIN TEST - 72 HR READ: NORMAL

## 2019-07-29 LAB
CTP QC/QA: YES
FECAL OCCULT BLOOD, POC: NEGATIVE

## 2019-11-03 ENCOUNTER — HOSPITAL ENCOUNTER (EMERGENCY)
Facility: OTHER | Age: 24
Discharge: HOME | End: 2019-11-03
Attending: EMERGENCY MEDICINE
Payer: MEDICAID

## 2019-11-03 VITALS
DIASTOLIC BLOOD PRESSURE: 78 MMHG | WEIGHT: 143 LBS | HEART RATE: 78 BPM | SYSTOLIC BLOOD PRESSURE: 122 MMHG | BODY MASS INDEX: 26.31 KG/M2 | HEIGHT: 62 IN | TEMPERATURE: 98 F | OXYGEN SATURATION: 98 % | RESPIRATION RATE: 16 BRPM

## 2019-11-03 DIAGNOSIS — N73.0 ACUTE PELVIC INFLAMMATORY DISEASE (PID): Primary | ICD-10-CM

## 2019-11-03 LAB
B-HCG UR QL: NEGATIVE
BACTERIA GENITAL QL WET PREP: NORMAL
BILIRUB UR QL STRIP: NEGATIVE
CLARITY UR: CLEAR
CLUE CELLS VAG QL WET PREP: NORMAL
COLOR UR: YELLOW
CTP QC/QA: YES
FILAMENT FUNGI VAG WET PREP-#/AREA: NORMAL
GLUCOSE UR QL STRIP: NEGATIVE
HGB UR QL STRIP: NEGATIVE
KETONES UR QL STRIP: NEGATIVE
LEUKOCYTE ESTERASE UR QL STRIP: NEGATIVE
NITRITE UR QL STRIP: NEGATIVE
PH UR STRIP: 8 [PH] (ref 5–8)
PROT UR QL STRIP: NEGATIVE
SP GR UR STRIP: 1.02 (ref 1–1.03)
SPECIMEN SOURCE: NORMAL
T VAGINALIS GENITAL QL WET PREP: NORMAL
URN SPEC COLLECT METH UR: NORMAL
UROBILINOGEN UR STRIP-ACNC: 1 EU/DL
WBC #/AREA VAG WET PREP: NORMAL
YEAST GENITAL QL WET PREP: NORMAL

## 2019-11-03 PROCEDURE — 81003 URINALYSIS AUTO W/O SCOPE: CPT

## 2019-11-03 PROCEDURE — 87491 CHLMYD TRACH DNA AMP PROBE: CPT

## 2019-11-03 PROCEDURE — 81025 URINE PREGNANCY TEST: CPT | Performed by: EMERGENCY MEDICINE

## 2019-11-03 PROCEDURE — 99283 EMERGENCY DEPT VISIT LOW MDM: CPT

## 2019-11-03 PROCEDURE — 87210 SMEAR WET MOUNT SALINE/INK: CPT

## 2019-11-03 RX ORDER — DOXYCYCLINE 100 MG/1
100 CAPSULE ORAL 2 TIMES DAILY
Qty: 20 CAPSULE | Refills: 0 | Status: SHIPPED | OUTPATIENT
Start: 2019-11-03 | End: 2019-11-17

## 2019-11-03 RX ORDER — CEFTRIAXONE 250 MG/1
250 INJECTION, POWDER, FOR SOLUTION INTRAMUSCULAR; INTRAVENOUS
Status: DISCONTINUED | OUTPATIENT
Start: 2019-11-03 | End: 2019-11-04 | Stop reason: HOSPADM

## 2019-11-04 LAB
C TRACH DNA SPEC QL NAA+PROBE: NOT DETECTED
N GONORRHOEA DNA SPEC QL NAA+PROBE: NOT DETECTED

## 2019-11-04 NOTE — ED PROVIDER NOTES
"Encounter Date: 11/3/2019    SCRIBE #1 NOTE: I, Saleem Bigsg, am scribing for, and in the presence of, Dr. Gonzalez.       History     Chief Complaint   Patient presents with    STD CHECK     "I have a hard smell" X a few days Hx of chlamydia and thinks she may have it again     Time seen by provider: 11:07 PM    This is a 24 y.o. female who presents with complaint of yellow vaginal discharge with a foul smell that she has had for a few days. She also reports right lower abdominal pain and vaginal itching that she has had all day while at work. The patient denies fever, nausea, vomiting, diarrhea, urinary symptoms, or back pain. She reports concern from chlamydia because her symptoms are similar to when she had it a year ago.    The history is provided by the patient.     Review of patient's allergies indicates:  No Known Allergies  Past Medical History:   Diagnosis Date    Anemia      No past surgical history on file.  Family History   Problem Relation Age of Onset    Breast cancer Neg Hx     Colon cancer Neg Hx     Ovarian cancer Neg Hx      Social History     Tobacco Use    Smoking status: Never Smoker   Substance Use Topics    Alcohol use: No    Drug use: No     Review of Systems   Constitutional: Negative for fever.   HENT: Negative for sore throat.    Respiratory: Negative for shortness of breath.    Cardiovascular: Negative for chest pain.   Gastrointestinal: Positive for abdominal pain. Negative for diarrhea, nausea and vomiting.   Genitourinary: Positive for vaginal discharge (Yellow. Malodorous.). Negative for dysuria, frequency, hematuria and urgency.        + Vaginal itching.   Musculoskeletal: Negative for back pain.   Skin: Negative for rash.   Neurological: Negative for weakness.   Hematological: Does not bruise/bleed easily.   All other systems reviewed and are negative.      Physical Exam     Initial Vitals [11/03/19 2238]   BP Pulse Resp Temp SpO2   135/64 70 16 98.1 °F (36.7 °C) 100 %    "   MAP       --         Physical Exam    Nursing note and vitals reviewed.  Constitutional: She appears well-developed and well-nourished. She is not diaphoretic. No distress.   HENT:   Head: Normocephalic and atraumatic.   Mouth/Throat: Oropharynx is clear and moist.   Eyes: Conjunctivae and EOM are normal.   Neck: Normal range of motion. Neck supple.   Cardiovascular: Normal rate, regular rhythm and normal heart sounds.   Pulmonary/Chest: Breath sounds normal. No respiratory distress. She has no wheezes. She has no rhonchi. She has no rales.   Abdominal: Soft. Bowel sounds are normal. She exhibits no distension. There is no tenderness. There is no rebound and no guarding.   Genitourinary:   Genitourinary Comments: Scant amount of yellowish discharge. No CMT but bilateral adnexal tenderness.   Musculoskeletal: Normal range of motion.   Neurological: She is alert and oriented to person, place, and time.   Skin: Skin is warm and dry.         ED Course   Procedures  Labs Reviewed   C. TRACHOMATIS/N. GONORRHOEAE BY AMP DNA   VAGINAL SCREEN   URINALYSIS, REFLEX TO URINE CULTURE    Narrative:     Preferred Collection Type->Urine, Clean Catch   POCT URINE PREGNANCY          Imaging Results    None          Medical Decision Making:   History:   Old Medical Records: I decided to obtain old medical records.  Clinical Tests:   Lab Tests: Ordered and Reviewed    Additional MDM:   Comments: 24-year-old female presents complaining of foul-smelling vaginal discharge times a few days.  She denies any urinary symptoms. On exam she had scant amount of yellowish discharge adnexal tenderness bilaterally. Given concern for PID she will be treated with doxycycline and Rocephin.    11:53 PM  A prep negative. Urinalysis negative for UTI.  Patient refusing Rocephin stating that she is not concerned for gonorrhea.  She will be given a prescription for doxycycline times 14 days instructed to follow up with her gyn for re-evaluation..           Scribe Attestation:   Scribe #1: I performed the above scribed service and the documentation accurately describes the services I performed. I attest to the accuracy of the note.    Attending Attestation:           Physician Attestation for Scribe:  Physician Attestation Statement for Scribe #1: I, Dr. Gonzalez, reviewed documentation, as scribed by Saleem Biggs in my presence, and it is both accurate and complete.                    Clinical Impression:     1. Acute pelvic inflammatory disease (PID)                                   Belkys Gonzalez MD  11/03/19 7937

## 2019-11-04 NOTE — ED NOTES
Pt has a foul odor and yellow vaginal discharge X a few days, Hx of bacterial vaginosis and chlamydia. She feels like she may have chlamydia again. She also has had pelvic pain

## 2020-01-31 ENCOUNTER — HOSPITAL ENCOUNTER (EMERGENCY)
Facility: OTHER | Age: 25
Discharge: HOME | End: 2020-01-31
Attending: EMERGENCY MEDICINE
Payer: MEDICAID

## 2020-01-31 VITALS
HEART RATE: 86 BPM | HEIGHT: 62 IN | BODY MASS INDEX: 27.6 KG/M2 | OXYGEN SATURATION: 100 % | RESPIRATION RATE: 18 BRPM | DIASTOLIC BLOOD PRESSURE: 81 MMHG | TEMPERATURE: 98 F | SYSTOLIC BLOOD PRESSURE: 122 MMHG | WEIGHT: 150 LBS

## 2020-01-31 DIAGNOSIS — N76.0 BACTERIAL VAGINITIS: ICD-10-CM

## 2020-01-31 DIAGNOSIS — N76.1 SUBACUTE VAGINITIS: Primary | ICD-10-CM

## 2020-01-31 DIAGNOSIS — B96.89 BACTERIAL VAGINITIS: ICD-10-CM

## 2020-01-31 LAB
B-HCG UR QL: NEGATIVE
BACTERIA GENITAL QL WET PREP: ABNORMAL
BILIRUB UR QL STRIP: NEGATIVE
CLARITY UR: CLEAR
CLUE CELLS VAG QL WET PREP: ABNORMAL
COLOR UR: YELLOW
CTP QC/QA: YES
FILAMENT FUNGI VAG WET PREP-#/AREA: ABNORMAL
GLUCOSE UR QL STRIP: NEGATIVE
HGB UR QL STRIP: NEGATIVE
KETONES UR QL STRIP: NEGATIVE
LEUKOCYTE ESTERASE UR QL STRIP: NEGATIVE
NITRITE UR QL STRIP: NEGATIVE
PH UR STRIP: 7 [PH] (ref 5–8)
PROT UR QL STRIP: NEGATIVE
SP GR UR STRIP: 1.02 (ref 1–1.03)
SPECIMEN SOURCE: ABNORMAL
T VAGINALIS GENITAL QL WET PREP: ABNORMAL
URN SPEC COLLECT METH UR: ABNORMAL
UROBILINOGEN UR STRIP-ACNC: ABNORMAL EU/DL
WBC #/AREA VAG WET PREP: ABNORMAL
YEAST GENITAL QL WET PREP: ABNORMAL

## 2020-01-31 PROCEDURE — 63600175 PHARM REV CODE 636 W HCPCS: Performed by: EMERGENCY MEDICINE

## 2020-01-31 PROCEDURE — 87491 CHLMYD TRACH DNA AMP PROBE: CPT

## 2020-01-31 PROCEDURE — 25000003 PHARM REV CODE 250: Performed by: EMERGENCY MEDICINE

## 2020-01-31 PROCEDURE — 81025 URINE PREGNANCY TEST: CPT | Performed by: EMERGENCY MEDICINE

## 2020-01-31 PROCEDURE — 99284 EMERGENCY DEPT VISIT MOD MDM: CPT

## 2020-01-31 PROCEDURE — 87210 SMEAR WET MOUNT SALINE/INK: CPT

## 2020-01-31 PROCEDURE — 81003 URINALYSIS AUTO W/O SCOPE: CPT

## 2020-01-31 RX ORDER — CEFTRIAXONE 250 MG/1
250 INJECTION, POWDER, FOR SOLUTION INTRAMUSCULAR; INTRAVENOUS
Status: DISCONTINUED | OUTPATIENT
Start: 2020-01-31 | End: 2020-01-31 | Stop reason: HOSPADM

## 2020-01-31 RX ORDER — AZITHROMYCIN 250 MG/1
1000 TABLET, FILM COATED ORAL
Status: COMPLETED | OUTPATIENT
Start: 2020-01-31 | End: 2020-01-31

## 2020-01-31 RX ORDER — METRONIDAZOLE 500 MG/1
500 TABLET ORAL
Status: COMPLETED | OUTPATIENT
Start: 2020-01-31 | End: 2020-01-31

## 2020-01-31 RX ORDER — METRONIDAZOLE 500 MG/1
500 TABLET ORAL 3 TIMES DAILY
Qty: 14 TABLET | Refills: 0 | Status: SHIPPED | OUTPATIENT
Start: 2020-01-31 | End: 2020-02-07

## 2020-01-31 RX ADMIN — AZITHROMYCIN 1000 MG: 250 TABLET, FILM COATED ORAL at 08:01

## 2020-01-31 RX ADMIN — METRONIDAZOLE 500 MG: 500 TABLET ORAL at 08:01

## 2020-02-01 NOTE — ED PROVIDER NOTES
"Encounter Date: 1/31/2020    SCRIBE #1 NOTE: I, Debtiffanie Carrasquillo, am scribing for, and in the presence of, Dr. Vila.       History     Chief Complaint   Patient presents with    Urinary Tract Infection     Pt to ER with c/o foul smelling urine x 2 weeks.      Time seen by provider: 6:47 PM    This is a 24 y.o. female who presents with complaint of abdominal pain that began about a week ago. Patient reports associated vaginal discharge "that looks like yeast," "foul fishy smell," and vaginal itchiness. Patient denies nausea, vomiting, dysuria, fever, chest pain, or SOB. Patient reports history of BV and STDs. She states the last time she was on medication for BV was last year. Patient notes she has had two sexual partners within the last year. She reports she is not currently sexually active; her last sexual encounter was three months ago. Patient denies having unprotected sexual intercourse. Patient reports her LMP was 1/11/2020. She denies any recent weight changes. Patient reports her OBGYN is Dr. Boo. Patient notes she works for the [employer].    The history is provided by the patient.     Review of patient's allergies indicates:  No Known Allergies  Past Medical History:   Diagnosis Date    Anemia      No past surgical history on file.  Family History   Problem Relation Age of Onset    Breast cancer Neg Hx     Colon cancer Neg Hx     Ovarian cancer Neg Hx      Social History     Tobacco Use    Smoking status: Never Smoker   Substance Use Topics    Alcohol use: No    Drug use: No     Review of Systems   Constitutional: Negative for fever.   HENT: Negative for sore throat.    Respiratory: Negative for shortness of breath.    Cardiovascular: Negative for chest pain.   Gastrointestinal: Positive for abdominal pain. Negative for nausea and vomiting.   Genitourinary: Positive for vaginal discharge. Negative for dysuria, vaginal bleeding and vaginal pain.        Positive for vaginal itchiness. "   Musculoskeletal: Negative for back pain.   Skin: Negative for rash.   Neurological: Negative for weakness.   Hematological: Does not bruise/bleed easily.       Physical Exam     Initial Vitals [01/31/20 1811]   BP Pulse Resp Temp SpO2   122/81 86 18 97.7 °F (36.5 °C) 100 %      MAP       --         Physical Exam    Constitutional: She appears well-developed and well-nourished. No distress.   HENT:   Head: Normocephalic and atraumatic.   Eyes: EOM are normal. Pupils are equal, round, and reactive to light.   Neck: Normal range of motion. Neck supple.   Cardiovascular: Normal rate.   Pulmonary/Chest: Breath sounds normal. No respiratory distress.   Abdominal: Soft. There is no tenderness.   No suprapubic tenderness.   Genitourinary: Vaginal discharge found.   Genitourinary Comments: RN chaperone present. Cervix visualized. Slight erythema. White discharge. No foul odor. No adnexal tenderness. No CMT.   Musculoskeletal: Normal range of motion.   Neurological: She is alert and oriented to person, place, and time. She has normal strength.   Skin: Skin is warm and dry.         ED Course   Procedures  Labs Reviewed   URINALYSIS - Abnormal; Notable for the following components:       Result Value    Urobilinogen, UA 2.0-3.0 (*)     All other components within normal limits   VAGINAL SCREEN - Abnormal; Notable for the following components:    Clue Cells Moderate (*)     WBC - Vaginal Screen Rare (*)     Bacteria - Vaginal Screen Moderate (*)     All other components within normal limits   POCT URINE PREGNANCY - Normal   C. TRACHOMATIS/N. GONORRHOEAE BY AMP DNA          Imaging Results    None          Medical Decision Making:   History:   Old Medical Records: I decided to obtain old medical records.  Differential Diagnosis:   Likely vaginitis vs. BV vs. Trichomoniasis. Does not appear like a yeast infection but due to itchiness will vaginal swabs, UA, anticipate discharge with close follow up.  Clinical Tests:   Lab Tests:  Ordered and Reviewed            Scribe Attestation:   Scribe #1: I performed the above scribed service and the documentation accurately describes the services I performed. I attest to the accuracy of the note.    Attending Attestation:           Physician Attestation for Scribe:  Physician Attestation Statement for Scribe #1: I, Dr. Vila, reviewed documentation, as scribed by Deb Carrasquillo in my presence, and it is both accurate and complete.                 ED Course as of Jan 31 2031 Fri Jan 31, 2020 1956 Clue Cells, Wet Prep(!): Moderate [MG]   1956 WBC - Vaginal Screen(!): Rare [MG]   1956 Bacteria - Vaginal Screen(!): Moderate [MG]      ED Course User Index  [MG] Eli Vila MD            Well appearing 24-year-old female with a history of vaginitis, presents emergency department with similar are symptoms, fishy odor, vaginal discharge with itching.  Pelvic exam had frothy discharge. No cervical motion tenderness no adnexal tenderness.  Patient did not have any sort of weight loss or alarming red flag symptoms. Vaginal swab consistent with BV.  She has had who 2 partners in previous history of gonorrhea chlamydia and did agree with empiric treatment.  She was discharged in stable condition, with Flagyl and return precautions    Clinical Impression:     1. Subacute vaginitis    2. Bacterial vaginitis               Eli Vila MD  [facility]  Emergency Department                   Eli Vila MD  01/31/20 2032

## 2020-02-01 NOTE — ED NOTES
24 year old female to ED for vaginal discharge and abdominal cramping. She states that the discharge is yellow and she has frequency and dysuria as well. ED workup initiated.

## 2020-02-03 LAB
C TRACH DNA SPEC QL NAA+PROBE: NOT DETECTED
N GONORRHOEA DNA SPEC QL NAA+PROBE: NOT DETECTED

## 2020-04-17 ENCOUNTER — OFFICE VISIT (OUTPATIENT)
Dept: FAMILY MEDICINE | Facility: CLINIC | Age: 25
End: 2020-04-17
Payer: MEDICAID

## 2020-04-17 DIAGNOSIS — B96.89 BACTERIAL VAGINITIS: Primary | ICD-10-CM

## 2020-04-17 DIAGNOSIS — N76.0 BACTERIAL VAGINITIS: Primary | ICD-10-CM

## 2020-04-17 PROCEDURE — 99202 OFFICE O/P NEW SF 15 MIN: CPT | Mod: 95,,, | Performed by: FAMILY MEDICINE

## 2020-04-17 PROCEDURE — 99202 PR OFFICE/OUTPT VISIT, NEW, LEVL II, 15-29 MIN: ICD-10-PCS | Mod: 95,,, | Performed by: FAMILY MEDICINE

## 2020-04-17 RX ORDER — FLUCONAZOLE 150 MG/1
150 TABLET ORAL DAILY
Qty: 1 TABLET | Refills: 0 | Status: SHIPPED | OUTPATIENT
Start: 2020-04-17 | End: 2020-04-18

## 2020-04-17 RX ORDER — METRONIDAZOLE 500 MG/1
500 TABLET ORAL EVERY 12 HOURS
Qty: 14 TABLET | Refills: 0 | Status: SHIPPED | OUTPATIENT
Start: 2020-04-17 | End: 2020-04-24

## 2020-04-17 NOTE — PROGRESS NOTES
Subjective:       Patient ID: Gurdeep Harris     Chief Complaint: No chief complaint on file.      HPIGurdeep Harris is a 24 y.o. female. With a history of recurrent bacterial vaginosis presents with yellow vaginal discharge and fishy odor x 1 month.  Denies pelvic pain or fever.    Review of patient's allergies indicates:  No Known Allergies    Current Outpatient Medications:     fluconazole (DIFLUCAN) 150 MG Tab, Take 1 tablet (150 mg total) by mouth once daily. for 1 day, Disp: 1 tablet, Rfl: 0    metroNIDAZOLE (FLAGYL) 500 MG tablet, Take 1 tablet (500 mg total) by mouth every 12 (twelve) hours. for 7 days, Disp: 14 tablet, Rfl: 0    naproxen (NAPROSYN) 500 MG tablet, Take 1 tablet (500 mg total) by mouth every 12 (twelve) hours as needed (pain)., Disp: 10 tablet, Rfl: 0    nifedipine (PROCARDIA-XL) 30 MG (OSM) 24 hr tablet, Take 1 tablet (30 mg total) by mouth once daily., Disp: 30 tablet, Rfl: 6    Past Medical History:   Diagnosis Date    Anemia      Review of Systems   Genitourinary: Positive for vaginal discharge. Negative for dysuria, frequency, pelvic pain, vaginal bleeding and vaginal pain.       Objective:      Physical Exam   Constitutional: She appears well-developed and well-nourished.   Pulmonary/Chest: Effort normal.   Neurological: She is alert.       Assessment:       1. Bacterial vaginitis        Plan:       Diagnoses and all orders for this visit:    Bacterial vaginitis  -     metroNIDAZOLE (FLAGYL) 500 MG tablet; Take 1 tablet (500 mg total) by mouth every 12 (twelve) hours. for 7 days  -     fluconazole (DIFLUCAN) 150 MG Tab; Take 1 tablet (150 mg total) by mouth once daily. for 1 day    Patient advised to follow up if symptoms does not resolve in 1 week or if pelvic pain or fever occurs.    The patient location is:home  The chief complaint leading to consultation is: vaginal infection  Visit type: audiovisual  Total time spent with patient: 4 min  Each patient to whom he or she  provides medical services by telemedicine is:  (1) informed of the relationship between the physician and patient and the respective role of any other health care provider with respect to management of the patient; and (2) notified that he or she may decline to receive medical services by telemedicine and may withdraw from such care at any time.

## 2020-04-29 ENCOUNTER — PATIENT MESSAGE (OUTPATIENT)
Dept: FAMILY MEDICINE | Facility: CLINIC | Age: 25
End: 2020-04-29

## 2020-04-29 DIAGNOSIS — B37.31 VAGINAL YEAST INFECTION: Primary | ICD-10-CM

## 2020-04-30 RX ORDER — FLUCONAZOLE 150 MG/1
150 TABLET ORAL ONCE
Qty: 1 TABLET | Refills: 0 | Status: SHIPPED | OUTPATIENT
Start: 2020-04-30 | End: 2020-04-30

## 2020-05-22 ENCOUNTER — OFFICE VISIT (OUTPATIENT)
Dept: FAMILY MEDICINE | Facility: CLINIC | Age: 25
End: 2020-05-22
Payer: MEDICAID

## 2020-05-22 VITALS
TEMPERATURE: 98 F | HEART RATE: 84 BPM | DIASTOLIC BLOOD PRESSURE: 62 MMHG | BODY MASS INDEX: 27.66 KG/M2 | WEIGHT: 151.25 LBS | SYSTOLIC BLOOD PRESSURE: 110 MMHG

## 2020-05-22 DIAGNOSIS — N76.1 SUBACUTE VAGINITIS: Primary | ICD-10-CM

## 2020-05-22 PROCEDURE — 99213 OFFICE O/P EST LOW 20 MIN: CPT | Mod: PBBFAC,PO | Performed by: FAMILY MEDICINE

## 2020-05-22 PROCEDURE — 87661 TRICHOMONAS VAGINALIS AMPLIF: CPT

## 2020-05-22 PROCEDURE — 87491 CHLMYD TRACH DNA AMP PROBE: CPT | Mod: 59

## 2020-05-22 PROCEDURE — 99999 PR PBB SHADOW E&M-EST. PATIENT-LVL III: ICD-10-PCS | Mod: PBBFAC,,, | Performed by: FAMILY MEDICINE

## 2020-05-22 PROCEDURE — 99213 OFFICE O/P EST LOW 20 MIN: CPT | Mod: S$PBB,,, | Performed by: FAMILY MEDICINE

## 2020-05-22 PROCEDURE — 87481 CANDIDA DNA AMP PROBE: CPT | Mod: 59

## 2020-05-22 PROCEDURE — 99213 PR OFFICE/OUTPT VISIT, EST, LEVL III, 20-29 MIN: ICD-10-PCS | Mod: S$PBB,,, | Performed by: FAMILY MEDICINE

## 2020-05-22 PROCEDURE — 99999 PR PBB SHADOW E&M-EST. PATIENT-LVL III: CPT | Mod: PBBFAC,,, | Performed by: FAMILY MEDICINE

## 2020-05-22 RX ORDER — FLUCONAZOLE 150 MG/1
150 TABLET ORAL DAILY
Qty: 1 TABLET | Refills: 0 | Status: SHIPPED | OUTPATIENT
Start: 2020-05-22 | End: 2020-05-23

## 2020-05-22 NOTE — PROGRESS NOTES
Subjective:       Patient ID: Gurdeep Harris     Chief Complaint: Hospital Follow Up      HPIGurdeep Harris is a 24 y.o. female.presents with recurrent vaginitis over past month.  Recently treated in ER for BV.  Reports persistent thick yellow discharge.  Mild spotting after intercourse.    Review of patient's allergies indicates:  No Known Allergies    Current Outpatient Medications:     fluconazole (DIFLUCAN) 150 MG Tab, Take 1 tablet (150 mg total) by mouth once daily. for 1 day, Disp: 1 tablet, Rfl: 0    naproxen (NAPROSYN) 500 MG tablet, Take 1 tablet (500 mg total) by mouth every 12 (twelve) hours as needed (pain). (Patient not taking: Reported on 5/22/2020), Disp: 10 tablet, Rfl: 0    nifedipine (PROCARDIA-XL) 30 MG (OSM) 24 hr tablet, Take 1 tablet (30 mg total) by mouth once daily., Disp: 30 tablet, Rfl: 6    Past Medical History:   Diagnosis Date    Anemia      Review of Systems   Constitutional: Negative for chills.   Gastrointestinal: Negative for constipation, nausea and vomiting.   Genitourinary: Positive for dysuria and vaginal discharge. Negative for flank pain, frequency, hematuria, pelvic pain and urgency.   Skin: Negative for rash.       Objective:      Physical Exam   Constitutional: She appears well-developed and well-nourished.   Genitourinary: Cervix exhibits no discharge and no friability. Vaginal discharge (thick yellow cottage cheese) found.       Assessment:       1. Subacute vaginitis        Plan:       Gurdeep was seen today for hospital follow up.    Diagnoses and all orders for this visit:    Subacute vaginitis  -     fluconazole (DIFLUCAN) 150 MG Tab; Take 1 tablet (150 mg total) by mouth once daily. for 1 day  -     C. trachomatis/N. gonorrhoeae by AMP DNA  -     Vaginosis Screen by DNA Probe

## 2020-05-23 LAB
BACTERIAL VAGINOSIS DNA: NEGATIVE
CANDIDA GLABRATA DNA: NEGATIVE
CANDIDA KRUSEI DNA: NEGATIVE
CANDIDA RRNA VAG QL PROBE: POSITIVE
T VAGINALIS RRNA GENITAL QL PROBE: NEGATIVE

## 2020-05-25 ENCOUNTER — PATIENT MESSAGE (OUTPATIENT)
Dept: FAMILY MEDICINE | Facility: CLINIC | Age: 25
End: 2020-05-25

## 2020-05-25 DIAGNOSIS — B37.31 VAGINAL YEAST INFECTION: Primary | ICD-10-CM

## 2020-05-25 DIAGNOSIS — N76.1 SUBACUTE VAGINITIS: Primary | ICD-10-CM

## 2020-05-25 RX ORDER — TERCONAZOLE 8 MG/G
1 CREAM VAGINAL NIGHTLY
Qty: 20 G | Refills: 0 | Status: SHIPPED | OUTPATIENT
Start: 2020-05-25 | End: 2020-05-28

## 2020-05-26 LAB
C TRACH DNA SPEC QL NAA+PROBE: NOT DETECTED
N GONORRHOEA DNA SPEC QL NAA+PROBE: NOT DETECTED

## 2020-05-28 ENCOUNTER — TELEPHONE (OUTPATIENT)
Dept: FAMILY MEDICINE | Facility: CLINIC | Age: 25
End: 2020-05-28

## 2020-05-28 NOTE — LETTER
May 28, 2020    Gurdeep Harris  [address] [place] [address]             [facility]  [address]  [place] [address]  Phone: [phone]  Fax: [phone] Dear Ms. Harris:    I have been unable to reach you by phone for your appointment to Gynecology. Please call me at the clinic [phone] to book your appointment.      If you have any questions or concerns, please don't hesitate to call.    Sincerely,

## 2020-06-08 ENCOUNTER — OFFICE VISIT (OUTPATIENT)
Dept: OBSTETRICS AND GYNECOLOGY | Facility: CLINIC | Age: 25
End: 2020-06-08
Payer: MEDICAID

## 2020-06-08 VITALS
WEIGHT: 154.75 LBS | DIASTOLIC BLOOD PRESSURE: 83 MMHG | SYSTOLIC BLOOD PRESSURE: 139 MMHG | BODY MASS INDEX: 28.31 KG/M2

## 2020-06-08 DIAGNOSIS — B37.9 YEAST INFECTION: Primary | ICD-10-CM

## 2020-06-08 DIAGNOSIS — Z12.4 CERVICAL CANCER SCREENING: ICD-10-CM

## 2020-06-08 PROCEDURE — 88141 PR  CYTOPATH CERV/VAG INTERPRET: ICD-10-PCS | Mod: ,,, | Performed by: PATHOLOGY

## 2020-06-08 PROCEDURE — 88141 CYTOPATH C/V INTERPRET: CPT | Mod: ,,, | Performed by: PATHOLOGY

## 2020-06-08 PROCEDURE — 99212 OFFICE O/P EST SF 10 MIN: CPT | Mod: PBBFAC | Performed by: OBSTETRICS & GYNECOLOGY

## 2020-06-08 PROCEDURE — 87624 HPV HI-RISK TYP POOLED RSLT: CPT

## 2020-06-08 PROCEDURE — 99999 PR PBB SHADOW E&M-EST. PATIENT-LVL II: CPT | Mod: PBBFAC,,, | Performed by: OBSTETRICS & GYNECOLOGY

## 2020-06-08 PROCEDURE — 87481 CANDIDA DNA AMP PROBE: CPT | Mod: 59

## 2020-06-08 PROCEDURE — 87801 DETECT AGNT MULT DNA AMPLI: CPT

## 2020-06-08 PROCEDURE — 88175 CYTOPATH C/V AUTO FLUID REDO: CPT | Performed by: PATHOLOGY

## 2020-06-08 PROCEDURE — 99999 PR PBB SHADOW E&M-EST. PATIENT-LVL II: ICD-10-PCS | Mod: PBBFAC,,, | Performed by: OBSTETRICS & GYNECOLOGY

## 2020-06-08 PROCEDURE — 99202 PR OFFICE/OUTPT VISIT, NEW, LEVL II, 15-29 MIN: ICD-10-PCS | Mod: S$PBB,,, | Performed by: OBSTETRICS & GYNECOLOGY

## 2020-06-08 PROCEDURE — 99202 OFFICE O/P NEW SF 15 MIN: CPT | Mod: S$PBB,,, | Performed by: OBSTETRICS & GYNECOLOGY

## 2020-06-08 RX ORDER — NYSTATIN 100000 U/G
CREAM TOPICAL 2 TIMES DAILY
Qty: 30 G | Refills: 1 | Status: ON HOLD | OUTPATIENT
Start: 2020-06-08 | End: 2021-04-11 | Stop reason: HOSPADM

## 2020-06-08 NOTE — PROGRESS NOTES
Subjective:       Patient ID: Gurdeep Harris is a 24 y.o. female.    Chief Complaint:  Vaginitis      History of Present Illness  HPI  24 y.o.   presents for evaluation of vulvo/vaginal symptoms.  Patient's last menstrual period was 2020 (exact date).    CC/HPI:   The patient complains of vaginal discharge, internal itching and internal burning.  Her discharge is clumpy;  Other associated symptoms:  bleeding with intercourse.     Symptoms have been present: several weeks.    She has tried Diflucan x 3 and Monistat for symptom relief.  It was ineffective.    The patient denies douching.  She denies recent antibiotic use.    Medical history:  Sexual history: sexually active, mutually monogamous  Past Medical History:   Diagnosis Date    Anemia      OB History        1    Para   1    Term   1       0    AB   0    Living   1       SAB   0    TAB   0    Ectopic   0    Multiple   0    Live Births   1           Obstetric Comments   22y/o AA  at 38 1/7 weeks based on U/S at 5 6/7 weeks, seen in our office today with SBP greater than 170 and DBP about 103. Patient noted to have pedal edema and was sent to the hospital           Review of patient's allergies indicates:  No Known Allergies     GYN & OB History  Patient's last menstrual period was 2020 (exact date).   Date of Last Pap: No result found    OB History    Para Term  AB Living   1 1 1 0 0 1   SAB TAB Ectopic Multiple Live Births   0 0 0 0 1      # Outcome Date GA Lbr Clemente/2nd Weight Sex Delivery Anes PTL Lv   1 Term 17 38w2d  2.865 kg (6 lb 5.1 oz) F Vag-Spont EPI N MICHAEL      Obstetric Comments   22y/o AA  at 38 1/7 weeks based on U/S at 5 6/7 weeks, seen in our office today with SBP greater than 170 and DBP about 103. Patient noted to have pedal edema and was sent to the hospital       Review of Systems  Review of Systems   Constitutional: Negative.    HENT: Negative.    Eyes: Negative.     Respiratory: Negative.    Cardiovascular: Negative.    Gastrointestinal: Negative.    Endocrine: Negative.    Genitourinary: Positive for vaginal discharge and vaginal dryness.   Musculoskeletal: Negative.    Integumentary:  Negative.   Neurological: Negative.    Hematological: Negative.    Psychiatric/Behavioral: Negative.    Breast: negative.            Objective:    Physical Exam:   Constitutional: She is oriented to person, place, and time. She appears well-developed.    HENT:   Head: Normocephalic and atraumatic.    Eyes: EOM are normal.     Cardiovascular: Normal rate.     Pulmonary/Chest: Effort normal.        Abdominal: Soft. There is no tenderness.     Genitourinary: Vagina normal and uterus normal. Pelvic exam was performed with patient prone. There is no rash, tenderness or lesion on the right labia. There is no rash, tenderness or lesion on the left labia. Uterus is not tender. Cervix is normal. Right adnexum displays no tenderness and no fullness. Left adnexum displays no tenderness and no fullness. No erythema or bleeding in the vagina. No vaginal discharge found. Labial bartholins normal.Cervix exhibits no motion tenderness.           Musculoskeletal: Normal range of motion.      Lymphadenopathy:        Right: No inguinal adenopathy present.        Left: No inguinal adenopathy present.    Neurological: She is oriented to person, place, and time.    Skin: Skin is warm.    Psychiatric: She has a normal mood and affect.          Assessment:        1. Yeast infection    2. Cervical cancer screening                Plan:      1. Yeast infection  Prescriptions as noted in orders  Reviewed vulvar/vaginal care and hygiene  Return visit if symptoms persist or progress despite treatment      - Vaginal hygiene reviewed.  - Probiotics encouraged.  - Patient was counseled today on vaginitis prevention including :  a. avoiding feminine products such as deoderant soaps, body wash, bubble bath, douches, scented  toilet paper, deoderant tampons or pads, feminine wipes, chronic pad use, etc.  b. avoiding other vulvovaginal irritants such as long hot baths, humidity, tight, synthetic clothing, chlorine and sitting around in wet bathing suits  c. wearing cotton underwear, avoiding thong underwear and no underwear to bed  d. taking showers instead of baths and use a hair dryer on cool setting afterwards to dry  e. wearing cotton to exercise and shower immediately after exercise and change clothes  f. using polyurethane condoms without spermicide if sexually active and symptoms are triggered by intercourse       - nystatin (MYCOSTATIN) cream; Apply topically 2 (two) times daily.  Dispense: 30 g; Refill: 1  - Vaginosis Screen by DNA Probe    2. Cervical cancer screening  - Liquid-Based Pap Smear, Screening

## 2020-06-10 LAB
BACTERIAL VAGINOSIS DNA: POSITIVE
CANDIDA GLABRATA DNA: NEGATIVE
CANDIDA KRUSEI DNA: NEGATIVE
CANDIDA RRNA VAG QL PROBE: POSITIVE
T VAGINALIS RRNA GENITAL QL PROBE: NEGATIVE

## 2020-06-12 ENCOUNTER — PATIENT MESSAGE (OUTPATIENT)
Dept: OBSTETRICS AND GYNECOLOGY | Facility: CLINIC | Age: 25
End: 2020-06-12

## 2020-06-12 DIAGNOSIS — B37.9 YEAST INFECTION: ICD-10-CM

## 2020-06-12 DIAGNOSIS — N76.0 BV (BACTERIAL VAGINOSIS): Primary | ICD-10-CM

## 2020-06-12 DIAGNOSIS — B96.89 BV (BACTERIAL VAGINOSIS): Primary | ICD-10-CM

## 2020-06-12 RX ORDER — METRONIDAZOLE 500 MG/1
500 TABLET ORAL 2 TIMES DAILY
Qty: 14 TABLET | Refills: 0 | Status: SHIPPED | OUTPATIENT
Start: 2020-06-12 | End: 2020-06-19

## 2020-06-12 RX ORDER — FLUCONAZOLE 150 MG/1
150 TABLET ORAL DAILY
Qty: 1 TABLET | Refills: 0 | Status: SHIPPED | OUTPATIENT
Start: 2020-06-12 | End: 2020-06-13

## 2020-06-16 LAB
FINAL PATHOLOGIC DIAGNOSIS: ABNORMAL
Lab: ABNORMAL

## 2020-06-22 LAB
HPV HR 12 DNA SPEC QL NAA+PROBE: POSITIVE
HPV16 AG SPEC QL: NEGATIVE
HPV18 DNA SPEC QL NAA+PROBE: NEGATIVE

## 2020-06-24 ENCOUNTER — PATIENT MESSAGE (OUTPATIENT)
Dept: OBSTETRICS AND GYNECOLOGY | Facility: CLINIC | Age: 25
End: 2020-06-24

## 2020-07-27 ENCOUNTER — OFFICE VISIT (OUTPATIENT)
Dept: OBSTETRICS AND GYNECOLOGY | Facility: CLINIC | Age: 25
End: 2020-07-27
Payer: MEDICAID

## 2020-07-27 ENCOUNTER — LAB VISIT (OUTPATIENT)
Dept: LAB | Facility: HOSPITAL | Age: 25
End: 2020-07-27
Attending: OBSTETRICS & GYNECOLOGY
Payer: MEDICAID

## 2020-07-27 VITALS — DIASTOLIC BLOOD PRESSURE: 76 MMHG | SYSTOLIC BLOOD PRESSURE: 120 MMHG | WEIGHT: 151.44 LBS | BODY MASS INDEX: 27.7 KG/M2

## 2020-07-27 DIAGNOSIS — B96.89 BV (BACTERIAL VAGINOSIS): ICD-10-CM

## 2020-07-27 DIAGNOSIS — Z11.3 SCREEN FOR STD (SEXUALLY TRANSMITTED DISEASE): ICD-10-CM

## 2020-07-27 DIAGNOSIS — N76.0 BV (BACTERIAL VAGINOSIS): ICD-10-CM

## 2020-07-27 DIAGNOSIS — N93.0 POSTCOITAL BLEEDING: Primary | ICD-10-CM

## 2020-07-27 PROCEDURE — 99999 PR PBB SHADOW E&M-EST. PATIENT-LVL II: ICD-10-PCS | Mod: PBBFAC,,, | Performed by: OBSTETRICS & GYNECOLOGY

## 2020-07-27 PROCEDURE — 36415 COLL VENOUS BLD VENIPUNCTURE: CPT

## 2020-07-27 PROCEDURE — 99213 OFFICE O/P EST LOW 20 MIN: CPT | Mod: S$PBB,,, | Performed by: OBSTETRICS & GYNECOLOGY

## 2020-07-27 PROCEDURE — 87340 HEPATITIS B SURFACE AG IA: CPT

## 2020-07-27 PROCEDURE — 86592 SYPHILIS TEST NON-TREP QUAL: CPT

## 2020-07-27 PROCEDURE — 86703 HIV-1/HIV-2 1 RESULT ANTBDY: CPT

## 2020-07-27 PROCEDURE — 99212 OFFICE O/P EST SF 10 MIN: CPT | Mod: PBBFAC | Performed by: OBSTETRICS & GYNECOLOGY

## 2020-07-27 PROCEDURE — 87491 CHLMYD TRACH DNA AMP PROBE: CPT

## 2020-07-27 PROCEDURE — 99213 PR OFFICE/OUTPT VISIT, EST, LEVL III, 20-29 MIN: ICD-10-PCS | Mod: S$PBB,,, | Performed by: OBSTETRICS & GYNECOLOGY

## 2020-07-27 PROCEDURE — 99999 PR PBB SHADOW E&M-EST. PATIENT-LVL II: CPT | Mod: PBBFAC,,, | Performed by: OBSTETRICS & GYNECOLOGY

## 2020-07-27 PROCEDURE — 86803 HEPATITIS C AB TEST: CPT

## 2020-07-27 RX ORDER — METRONIDAZOLE 500 MG/1
500 TABLET ORAL 2 TIMES DAILY
Qty: 14 TABLET | Refills: 0 | Status: SHIPPED | OUTPATIENT
Start: 2020-07-27 | End: 2020-08-03

## 2020-07-27 NOTE — PROGRESS NOTES
Subjective:       Patient ID: Gurdeep Harris is a 25 y.o. female.    Chief Complaint:  STD CHECK (blood work only )      History of Present Illness  HPI  Patient reports having postcoital vaginal bleeding and pain during intercourse.    She would like to be checked for STD.    Last pap was ASCUS + HRHPV.    GYN & OB History  Patient's last menstrual period was 2020.   Date of Last Pap: No result found    OB History    Para Term  AB Living   1 1 1 0 0 1   SAB TAB Ectopic Multiple Live Births   0 0 0 0 1      # Outcome Date GA Lbr Clemente/2nd Weight Sex Delivery Anes PTL Lv   1 Term 17 38w2d  2.865 kg (6 lb 5.1 oz) F Vag-Spont EPI N MICHAEL      Obstetric Comments   22y/o AA  at 38 1/7 weeks based on U/S at 5 6/7 weeks, seen in our office today with SBP greater than 170 and DBP about 103. Patient noted to have pedal edema and was sent to the hospital       Review of Systems  Review of Systems   Constitutional: Negative.    HENT: Negative.    Eyes: Negative.    Respiratory: Negative.    Cardiovascular: Negative.    Gastrointestinal: Negative.    Endocrine: Negative.    Genitourinary: Positive for dyspareunia and postcoital bleeding.   Musculoskeletal: Negative.    Integumentary:  Negative.   Neurological: Negative.    Hematological: Negative.    Psychiatric/Behavioral: Negative.    Breast: negative.            Objective:    Physical Exam:   Constitutional: She is oriented to person, place, and time. She appears well-developed.    HENT:   Head: Normocephalic and atraumatic.    Eyes: EOM are normal.     Cardiovascular: Normal rate.     Pulmonary/Chest: Effort normal.        Abdominal: Soft. There is no abdominal tenderness.     Genitourinary:    Vagina and uterus normal.      Pelvic exam was performed with patient supine.   There is no rash, tenderness or lesion on the right labia. There is no rash, tenderness or lesion on the left labia. Uterus is not tender. Cervix is normal. Right adnexum  displays no tenderness and no fullness. Left adnexum displays no tenderness and no fullness. No erythema or bleeding in the vagina. Labial bartholins normal.Cervix exhibits no motion tenderness. negative for vaginal discharge          Musculoskeletal: Normal range of motion.       Neurological: She is oriented to person, place, and time.    Skin: Skin is warm.    Psychiatric: She has a normal mood and affect.          Assessment:        1. Postcoital bleeding    2. Screen for STD (sexually transmitted disease)    3. BV (bacterial vaginosis)                Plan:      1. Postcoital bleeding  - C. trachomatis/N. gonorrhoeae by AMP DNA    2. Screen for STD (sexually transmitted disease)  - Hepatitis C Antibody; Future  - HIV 1/2 Ag/Ab (4th Gen); Future  - RPR; Future  - Hepatitis B Surface Antigen; Future    3. BV (bacterial vaginosis)  - metroNIDAZOLE (FLAGYL) 500 MG tablet; Take 1 tablet (500 mg total) by mouth 2 (two) times daily. for 7 days  Dispense: 14 tablet; Refill: 0

## 2020-07-28 LAB
HBV SURFACE AG SERPL QL IA: NEGATIVE
HCV AB SERPL QL IA: NEGATIVE
HIV 1+2 AB+HIV1 P24 AG SERPL QL IA: NEGATIVE
RPR SER QL: NORMAL

## 2020-07-31 LAB
C TRACH DNA SPEC QL NAA+PROBE: NOT DETECTED
N GONORRHOEA DNA SPEC QL NAA+PROBE: NOT DETECTED

## 2020-08-04 ENCOUNTER — PATIENT MESSAGE (OUTPATIENT)
Dept: OBSTETRICS AND GYNECOLOGY | Facility: CLINIC | Age: 25
End: 2020-08-04

## 2020-08-04 DIAGNOSIS — B37.9 YEAST INFECTION: Primary | ICD-10-CM

## 2020-08-04 RX ORDER — FLUCONAZOLE 150 MG/1
150 TABLET ORAL DAILY
Qty: 1 TABLET | Refills: 0 | Status: SHIPPED | OUTPATIENT
Start: 2020-08-04 | End: 2020-08-05

## 2020-08-12 RX ORDER — NYSTATIN 100000 U/G
CREAM TOPICAL 2 TIMES DAILY
Qty: 30 G | Refills: 0 | Status: SHIPPED | OUTPATIENT
Start: 2020-08-12 | End: 2020-08-17

## 2020-08-22 ENCOUNTER — HOSPITAL ENCOUNTER (EMERGENCY)
Facility: OTHER | Age: 25
Discharge: HOME | End: 2020-08-22
Attending: EMERGENCY MEDICINE
Payer: MEDICAID

## 2020-08-22 VITALS
BODY MASS INDEX: 27.6 KG/M2 | WEIGHT: 150 LBS | RESPIRATION RATE: 18 BRPM | TEMPERATURE: 99 F | DIASTOLIC BLOOD PRESSURE: 90 MMHG | OXYGEN SATURATION: 98 % | HEIGHT: 62 IN | SYSTOLIC BLOOD PRESSURE: 131 MMHG | HEART RATE: 85 BPM

## 2020-08-22 DIAGNOSIS — B96.89 BV (BACTERIAL VAGINOSIS): ICD-10-CM

## 2020-08-22 DIAGNOSIS — K59.00 CONSTIPATION, UNSPECIFIED CONSTIPATION TYPE: ICD-10-CM

## 2020-08-22 DIAGNOSIS — Z34.90 PREGNANCY, UNSPECIFIED GESTATIONAL AGE: Primary | ICD-10-CM

## 2020-08-22 DIAGNOSIS — N76.0 BV (BACTERIAL VAGINOSIS): ICD-10-CM

## 2020-08-22 LAB
B-HCG UR QL: POSITIVE
BACTERIA GENITAL QL WET PREP: ABNORMAL
BILIRUB UR QL STRIP: NEGATIVE
CLARITY UR: CLEAR
CLUE CELLS VAG QL WET PREP: ABNORMAL
COLOR UR: YELLOW
CTP QC/QA: YES
FILAMENT FUNGI VAG WET PREP-#/AREA: ABNORMAL
GLUCOSE UR QL STRIP: NEGATIVE
HGB UR QL STRIP: NEGATIVE
KETONES UR QL STRIP: ABNORMAL
LEUKOCYTE ESTERASE UR QL STRIP: NEGATIVE
NITRITE UR QL STRIP: NEGATIVE
PH UR STRIP: 8 [PH] (ref 5–8)
PROT UR QL STRIP: NEGATIVE
SP GR UR STRIP: 1.02 (ref 1–1.03)
SPECIMEN SOURCE: ABNORMAL
T VAGINALIS GENITAL QL WET PREP: ABNORMAL
URN SPEC COLLECT METH UR: ABNORMAL
UROBILINOGEN UR STRIP-ACNC: ABNORMAL EU/DL
WBC #/AREA VAG WET PREP: ABNORMAL
YEAST GENITAL QL WET PREP: ABNORMAL

## 2020-08-22 PROCEDURE — 25000003 PHARM REV CODE 250: Performed by: PHYSICIAN ASSISTANT

## 2020-08-22 PROCEDURE — 81003 URINALYSIS AUTO W/O SCOPE: CPT

## 2020-08-22 PROCEDURE — 81025 URINE PREGNANCY TEST: CPT | Performed by: EMERGENCY MEDICINE

## 2020-08-22 PROCEDURE — 87210 SMEAR WET MOUNT SALINE/INK: CPT

## 2020-08-22 PROCEDURE — 87491 CHLMYD TRACH DNA AMP PROBE: CPT

## 2020-08-22 PROCEDURE — 99284 EMERGENCY DEPT VISIT MOD MDM: CPT

## 2020-08-22 RX ORDER — DOCUSATE SODIUM 100 MG/1
100 CAPSULE, LIQUID FILLED ORAL 2 TIMES DAILY PRN
Qty: 60 CAPSULE | Refills: 0 | Status: ON HOLD | OUTPATIENT
Start: 2020-08-22 | End: 2021-04-11 | Stop reason: HOSPADM

## 2020-08-22 RX ORDER — LACTULOSE 10 G/15ML
10 SOLUTION ORAL 3 TIMES DAILY
Qty: 450 ML | Refills: 0 | Status: SHIPPED | OUTPATIENT
Start: 2020-08-22 | End: 2020-09-01

## 2020-08-22 RX ORDER — METRONIDAZOLE 7.5 MG/G
GEL TOPICAL DAILY
Qty: 45 G | Refills: 0 | Status: SHIPPED | OUTPATIENT
Start: 2020-08-22 | End: 2020-10-12

## 2020-08-22 RX ORDER — LACTULOSE 10 G/15ML
10 SOLUTION ORAL
Status: COMPLETED | OUTPATIENT
Start: 2020-08-22 | End: 2020-08-22

## 2020-08-22 RX ADMIN — LACTULOSE 10 G: 10 SOLUTION ORAL at 11:08

## 2020-08-22 NOTE — ED NOTES
Appearance: Pt awake, alert & oriented to person, place & time. Pt in no acute distress at present time. Pt is clean and well groomed with clothes appropriately fastened.   Skin: Skin warm, dry & intact. Color consistent with ethnicity. Mucous membranes moist. No breakdown or brusing noted.   Musculoskeletal: Patient moving all extremities well, no obvious swelling or deformities noted.   Respiratory: Respirations spontaneous, even, and non-labored. Visible chest rise noted. Airway is open and patent. No accessory muscle use noted.   Neurologic: Sensation is intact. Speech is clear and appropriate. Eyes open spontaneously, behavior appropriate to situation, follows commands,  purposeful motor response noted.   Cardiac:  No Bilateral lower extremity edema. Cap refill is <3 seconds. Pt denies active chest pains, SOB, dizziness, blurred vision, weakness or fatigue at this time.   Abdomen: SEE HPI. Non tender ABD.   : Pt reports no dysuria or hematuria.

## 2020-08-22 NOTE — Clinical Note
Gurdeep Harris was seen and treated in our emergency department on 8/22/2020.  She may return to work on 08/24/2020.       If you have any questions or concerns, please don't hesitate to call.      BENIGNO Richard

## 2020-08-22 NOTE — ED NOTES
Pt to ED c/o constipation x several days. Had home + UPT today. Denies dysuria. + vaginal discharge. Hasnt had full bowel movement in several days. Minimal bowel movement today. + lower abd cramping reported. No TTP. Pt AAOx4 and appropriate at this time. Respirations even and unlabored. No acute distress noted.

## 2020-08-23 NOTE — ED PROVIDER NOTES
Encounter Date: 2020       History     Chief Complaint   Patient presents with    Constipation     pt with c/o constipation and positive pg test.      Afebrile 25-year-old female who is  presents to the ED for evaluation of constipation.  Patient also states that she had a positive home pregnancy this week.  She states her last menstrual period was 20.  She states that she was prompted to take this test as with previous pregnancy she has suffered from constipation.  She is here today as she wanted confirmation of pregnancy.  She does report some vaginal discharge however denies any additional complaints including fever, chills, abdominal pain, vaginal bleeding or abdominal cramping.  She does report a history of previous BV infections and states that this discharge feels similar to that.  She denies any concern of STI.  She does state that she has OBGYN however has not yet seen them for this pregnancy.    The history is provided by the patient.     Review of patient's allergies indicates:  No Known Allergies  Past Medical History:   Diagnosis Date    Anemia      No past surgical history on file.  Family History   Problem Relation Age of Onset    Breast cancer Neg Hx     Colon cancer Neg Hx     Ovarian cancer Neg Hx      Social History     Tobacco Use    Smoking status: Never Smoker   Substance Use Topics    Alcohol use: No     Frequency: Never     Drinks per session: Patient refused     Binge frequency: Never    Drug use: No     Review of Systems   Constitutional: Negative for activity change, appetite change, chills and fever.   HENT: Negative for sore throat.    Respiratory: Negative for shortness of breath.    Cardiovascular: Negative for chest pain.   Gastrointestinal: Positive for constipation. Negative for abdominal pain, nausea and vomiting.   Genitourinary: Positive for vaginal discharge. Negative for decreased urine volume, dysuria, flank pain, hematuria, pelvic pain, vaginal bleeding  and vaginal pain.   Musculoskeletal: Negative for arthralgias, back pain and myalgias.   Skin: Negative for rash.   Neurological: Negative for weakness.   Hematological: Does not bruise/bleed easily.       Physical Exam     Initial Vitals [08/22/20 1054]   BP Pulse Resp Temp SpO2   130/82 (!) 115 18 98.5 °F (36.9 °C) 98 %      MAP       --         Vitals:    08/22/20 1254   BP: (!) 131/90   Pulse: 85   Resp: 18   Temp: 99 °F (37.2 °C)       Physical Exam    Nursing note and vitals reviewed.  Constitutional: Vital signs are normal. She appears well-developed and well-nourished. She is cooperative.  Non-toxic appearance. She does not appear ill. No distress.   HENT:   Head: Normocephalic and atraumatic.   Eyes: Conjunctivae and lids are normal.   Neck: Neck supple. No neck rigidity.   Cardiovascular: Normal rate and regular rhythm.   Pulmonary/Chest: Breath sounds normal. No respiratory distress.   Abdominal: Soft. Normal appearance and bowel sounds are normal. There is no abdominal tenderness. There is no rigidity and no guarding.   Genitourinary:    Pelvic exam was performed with patient supine.   Uterus is not enlarged and not tender. Cervix exhibits no motion tenderness. Right adnexum displays no tenderness. Left adnexum displays no tenderness.    Vaginal discharge present.     Musculoskeletal: Normal range of motion.   Neurological: She is alert and oriented to person, place, and time. GCS eye subscore is 4. GCS verbal subscore is 5. GCS motor subscore is 6.   Skin: Skin is warm, dry and intact. No rash noted.   Psychiatric: She has a normal mood and affect. Her speech is normal and behavior is normal. Thought content normal.         ED Course   Procedures  Labs Reviewed   URINALYSIS, REFLEX TO URINE CULTURE - Abnormal; Notable for the following components:       Result Value    Ketones, UA Trace (*)     Urobilinogen, UA 2.0-3.0 (*)     All other components within normal limits    Narrative:     Specimen  Source->Urine   VAGINAL SCREEN - Abnormal; Notable for the following components:    Clue Cells Moderate (*)     WBC - Vaginal Screen Rare (*)     Bacteria - Vaginal Screen Moderate (*)     All other components within normal limits   POCT URINE PREGNANCY - Abnormal; Notable for the following components:    POC Preg Test, Ur Positive (*)     All other components within normal limits   C. TRACHOMATIS/N. GONORRHOEAE BY AMP DNA          Imaging Results    None          Medical Decision Making:   Initial Assessment:   Well-appearing female presents the ED for evaluation of constipation and concern for pregnancy.  Patient did have positive home pregnancy test this week.  She reports some vaginal discharge.  She states that she has frequent BV infections and feels this could be occurring presently.  She states that she has not tried any medications for her symptoms although recently purchased boric acid.  Patient is in no distress.  Abdomen is soft and nontender.   exam reveals scant thin white discharge; no cervical motion tenderness, cervical os is closed, no adnexal tenderness and no uterine tenderness.  No bleeding appreciated.  Remaining exam grossly unremarkable.  Differential Diagnosis:   Differential Diagnosis includes, but is not limited to:  STI, HSV, BV, PID, TOA, vaginal foreign body, vaginal trauma, ovarian torsion, ovarian cyst, UTI/pyelonephritis, pregnancy complication, dysmenorrhea, mittelschmertz, appendicitis, nephrolithiasis, appendicitis, colitis, diverticulitis,    Clinical Tests:   Lab Tests: Ordered and Reviewed  ED Management:  Patient's UPT was no acute noted to be positive in the ED. Other than vaginal discharge she continues to deny any additional complaints.  We did discuss increasing water intake, fiber and a daily stool softener for constipation as this is prevalent in pregnancy.  She was given a dose of lactulose here and had successful BM.  She will sent home with this medication however  was encouraged to use if constipation increases again.   exam does reveal cervical os closed and clue cells noted.  As she does have recurrent reports of BV we will treat with metronidazole however emphasized instructions on close follow-up with her OB.  G/C pending however once again patient no concern and feel that discharge is likely secondary to the BV infection.  We did consider ectopic however low suspicion given no complaint pelvic pain and vitals indicate hemodynamically stable.  We did discuss likely early in pregnancy given her last menstrual.  Encouraged her to have close follow-up with her OB, begin a prenatal vitamin and return for any worsening symptoms or new symptoms. Strict instructions to follow up with primary care physician or reference provided for further assessment and evaluation. Given instructions to return for any acute symptoms and verbalized understanding of this medical plan.                                   Clinical Impression:       ICD-10-CM ICD-9-CM   1. Pregnancy, unspecified gestational age  Z34.90 V22.2   2. BV (bacterial vaginosis)  N76.0 616.10    B96.89 041.9   3. Constipation, unspecified constipation type  K59.00 564.00             ED Disposition Condition    Discharge Stable        ED Prescriptions     Medication Sig Dispense Start Date End Date Auth. Provider    metroNIDAZOLE (METROGEL) 0.75 % gel Apply topically once daily. for 5 days 45 g 8/22/2020 8/27/2020 BENIGNO Richard    docusate sodium (COLACE) 100 MG capsule Take 1 capsule (100 mg total) by mouth 2 (two) times daily as needed for Constipation. 60 capsule 8/22/2020  BENIGNO Richard    lactulose (CHRONULAC) 20 gram/30 mL Soln Take 15 mLs (10 g total) by mouth 3 (three) times daily. for 10 days 450 mL 8/22/2020 9/1/2020 BENIGNO Richard        Follow-up Information     Follow up With Specialties Details Why Contact Info    OBGYN  Go to                                        Aletha Bal  PA  08/23/20 1347

## 2020-09-04 ENCOUNTER — LAB VISIT (OUTPATIENT)
Dept: LAB | Facility: HOSPITAL | Age: 25
End: 2020-09-04
Attending: OBSTETRICS & GYNECOLOGY
Payer: MEDICAID

## 2020-09-04 ENCOUNTER — OFFICE VISIT (OUTPATIENT)
Dept: OBSTETRICS AND GYNECOLOGY | Facility: CLINIC | Age: 25
End: 2020-09-04
Payer: MEDICAID

## 2020-09-04 ENCOUNTER — TELEPHONE (OUTPATIENT)
Dept: OBSTETRICS AND GYNECOLOGY | Facility: CLINIC | Age: 25
End: 2020-09-04

## 2020-09-04 DIAGNOSIS — O09.291 HISTORY OF PRE-ECLAMPSIA IN PRIOR PREGNANCY, CURRENTLY PREGNANT IN FIRST TRIMESTER: Primary | ICD-10-CM

## 2020-09-04 DIAGNOSIS — O09.291 HISTORY OF PRE-ECLAMPSIA IN PRIOR PREGNANCY, CURRENTLY PREGNANT IN FIRST TRIMESTER: ICD-10-CM

## 2020-09-04 LAB
ABO + RH BLD: NORMAL
ANION GAP SERPL CALC-SCNC: 11 MMOL/L (ref 8–16)
BASOPHILS # BLD AUTO: 0.08 K/UL (ref 0–0.2)
BASOPHILS NFR BLD: 0.9 % (ref 0–1.9)
BLD GP AB SCN CELLS X3 SERPL QL: NORMAL
BUN SERPL-MCNC: 12 MG/DL (ref 6–20)
CALCIUM SERPL-MCNC: 9.9 MG/DL (ref 8.7–10.5)
CHLORIDE SERPL-SCNC: 103 MMOL/L (ref 95–110)
CO2 SERPL-SCNC: 25 MMOL/L (ref 23–29)
CREAT SERPL-MCNC: 0.8 MG/DL (ref 0.5–1.4)
DIFFERENTIAL METHOD: ABNORMAL
EOSINOPHIL # BLD AUTO: 0.2 K/UL (ref 0–0.5)
EOSINOPHIL NFR BLD: 2.4 % (ref 0–8)
ERYTHROCYTE [DISTWIDTH] IN BLOOD BY AUTOMATED COUNT: 16.9 % (ref 11.5–14.5)
EST. GFR  (AFRICAN AMERICAN): >60 ML/MIN/1.73 M^2
EST. GFR  (NON AFRICAN AMERICAN): >60 ML/MIN/1.73 M^2
GLUCOSE SERPL-MCNC: 94 MG/DL (ref 70–110)
HCT VFR BLD AUTO: 35.4 % (ref 37–48.5)
HGB BLD-MCNC: 10.6 G/DL (ref 12–16)
IMM GRANULOCYTES # BLD AUTO: 0.02 K/UL (ref 0–0.04)
IMM GRANULOCYTES NFR BLD AUTO: 0.2 % (ref 0–0.5)
LYMPHOCYTES # BLD AUTO: 1.5 K/UL (ref 1–4.8)
LYMPHOCYTES NFR BLD: 16.7 % (ref 18–48)
MCH RBC QN AUTO: 23.6 PG (ref 27–31)
MCHC RBC AUTO-ENTMCNC: 29.9 G/DL (ref 32–36)
MCV RBC AUTO: 79 FL (ref 82–98)
MONOCYTES # BLD AUTO: 0.7 K/UL (ref 0.3–1)
MONOCYTES NFR BLD: 7.5 % (ref 4–15)
NEUTROPHILS # BLD AUTO: 6.3 K/UL (ref 1.8–7.7)
NEUTROPHILS NFR BLD: 72.3 % (ref 38–73)
NRBC BLD-RTO: 0 /100 WBC
PLATELET # BLD AUTO: 480 K/UL (ref 150–350)
PMV BLD AUTO: 9.6 FL (ref 9.2–12.9)
POTASSIUM SERPL-SCNC: 4.1 MMOL/L (ref 3.5–5.1)
RBC # BLD AUTO: 4.5 M/UL (ref 4–5.4)
SODIUM SERPL-SCNC: 139 MMOL/L (ref 136–145)
WBC # BLD AUTO: 8.66 K/UL (ref 3.9–12.7)

## 2020-09-04 PROCEDURE — 86762 RUBELLA ANTIBODY: CPT

## 2020-09-04 PROCEDURE — 87340 HEPATITIS B SURFACE AG IA: CPT

## 2020-09-04 PROCEDURE — 99999 PR PBB SHADOW E&M-EST. PATIENT-LVL I: ICD-10-PCS | Mod: PBBFAC,,, | Performed by: OBSTETRICS & GYNECOLOGY

## 2020-09-04 PROCEDURE — 86592 SYPHILIS TEST NON-TREP QUAL: CPT

## 2020-09-04 PROCEDURE — 36415 COLL VENOUS BLD VENIPUNCTURE: CPT

## 2020-09-04 PROCEDURE — 80048 BASIC METABOLIC PNL TOTAL CA: CPT

## 2020-09-04 PROCEDURE — 99211 OFF/OP EST MAY X REQ PHY/QHP: CPT | Mod: PBBFAC,TH,25 | Performed by: OBSTETRICS & GYNECOLOGY

## 2020-09-04 PROCEDURE — 99999 PR PBB SHADOW E&M-EST. PATIENT-LVL I: CPT | Mod: PBBFAC,,, | Performed by: OBSTETRICS & GYNECOLOGY

## 2020-09-04 PROCEDURE — 85025 COMPLETE CBC W/AUTO DIFF WBC: CPT

## 2020-09-04 PROCEDURE — 99213 OFFICE O/P EST LOW 20 MIN: CPT | Mod: TH,S$PBB,, | Performed by: OBSTETRICS & GYNECOLOGY

## 2020-09-04 PROCEDURE — 86703 HIV-1/HIV-2 1 RESULT ANTBDY: CPT

## 2020-09-04 PROCEDURE — 99213 PR OFFICE/OUTPT VISIT, EST, LEVL III, 20-29 MIN: ICD-10-PCS | Mod: TH,S$PBB,, | Performed by: OBSTETRICS & GYNECOLOGY

## 2020-09-04 PROCEDURE — 86901 BLOOD TYPING SEROLOGIC RH(D): CPT

## 2020-09-04 NOTE — TELEPHONE ENCOUNTER
Pharmacy advised to change prenatal vitamins to which ever brand is covered, per .        ----- Message from Radha Dave sent at 9/4/2020  2:41 PM CDT -----  Contact: [facility] [phone]  Type:  Pharmacy Calling to Clarify an RX    Name of Caller: Cassandra    Pharmacy Name: [facility]    Prescription Name: PNV,calcium 72-iron-folic acid (PRENATAL VITAMIN PLUS LOW IRON) 27 mg iron- 1 mg Tab    What do they need to clarify?  Calling to find out if it's okay to change to any prenatal that's covered under her insurance?    Best Call Back Number: [phone]

## 2020-09-04 NOTE — PROGRESS NOTES
6w0d. Pt reports no complaints  Denies contractions, vaginal bleeding, or leakage of fluid.   US today shows small fetal pole with + FHR of 111  Connected Mom orders signed 2020  Trisomy screening:undecided  F/U 4 weeks. Next visit: routine care     Indications for low-dose aspirin use for the prevention of pre-eclampsia reviewed.  For this patient, her high risk factors include history of pre-eclampsia.   Her moderate risk factors include:  Sociodemographic characteristics: Black or .   Patient is a candidate for low-dose aspirin and will begin taking it 12w and discontinue with onset of labor, or 3-4 days prior to scheduled .        1. History of pre-eclampsia in prior pregnancy, currently pregnant in first trimester  - US OB/GYN Procedure ([facility]) - Extended List; Future  - HIV 1/2 Ag/Ab (4th Gen); Future  - RPR; Future  - Hepatitis B surface antigen; Future  - Type & Screen; Future  - Rubella antibody, IgG; Future  - CBC auto differential; Future  - Basic metabolic panel; Future  - PNV,calcium 72-iron-folic acid (PRENATAL VITAMIN PLUS LOW IRON) 27 mg iron- 1 mg Tab; Take one tablet daily.  Prescribe prenatal covered by insurance  Dispense: 90 tablet; Refill: 11  - Connected MOM Enrollment  - Assign Connected MOM Program Consent Questionnaire

## 2020-09-07 LAB
HBV SURFACE AG SERPL QL IA: NEGATIVE
HIV 1+2 AB+HIV1 P24 AG SERPL QL IA: NEGATIVE

## 2020-09-08 LAB
RPR SER QL: NORMAL
RUBV IGG SER-ACNC: 43.3 IU/ML
RUBV IGG SER-IMP: REACTIVE

## 2020-09-15 LAB
C TRACH DNA SPEC QL NAA+PROBE: NOT DETECTED
N GONORRHOEA DNA SPEC QL NAA+PROBE: NOT DETECTED

## 2020-09-17 ENCOUNTER — TELEPHONE (OUTPATIENT)
Dept: OBSTETRICS AND GYNECOLOGY | Facility: CLINIC | Age: 25
End: 2020-09-17

## 2020-09-17 ENCOUNTER — PATIENT MESSAGE (OUTPATIENT)
Dept: OBSTETRICS AND GYNECOLOGY | Facility: CLINIC | Age: 25
End: 2020-09-17

## 2020-09-18 ENCOUNTER — PATIENT MESSAGE (OUTPATIENT)
Dept: OBSTETRICS AND GYNECOLOGY | Facility: CLINIC | Age: 25
End: 2020-09-18

## 2020-09-24 ENCOUNTER — PROCEDURE VISIT (OUTPATIENT)
Dept: MATERNAL FETAL MEDICINE | Facility: CLINIC | Age: 25
End: 2020-09-24
Payer: MEDICAID

## 2020-09-24 ENCOUNTER — PATIENT MESSAGE (OUTPATIENT)
Dept: OBSTETRICS AND GYNECOLOGY | Facility: CLINIC | Age: 25
End: 2020-09-24

## 2020-09-24 DIAGNOSIS — N91.2 AMENORRHEA: ICD-10-CM

## 2020-09-24 DIAGNOSIS — O21.9 NAUSEA AND VOMITING DURING PREGNANCY PRIOR TO 22 WEEKS GESTATION: Primary | ICD-10-CM

## 2020-09-24 DIAGNOSIS — O09.291 HISTORY OF PRE-ECLAMPSIA IN PRIOR PREGNANCY, CURRENTLY PREGNANT IN FIRST TRIMESTER: ICD-10-CM

## 2020-09-24 PROCEDURE — 76801 OB US < 14 WKS SINGLE FETUS: CPT | Mod: 26,S$PBB,, | Performed by: OBSTETRICS & GYNECOLOGY

## 2020-09-24 PROCEDURE — 76801 OB US < 14 WKS SINGLE FETUS: CPT | Mod: PBBFAC,PO | Performed by: OBSTETRICS & GYNECOLOGY

## 2020-09-24 PROCEDURE — 76801 PR US, OB <14WKS, TRANSABD, SINGLE GESTATION: ICD-10-PCS | Mod: 26,S$PBB,, | Performed by: OBSTETRICS & GYNECOLOGY

## 2020-09-25 ENCOUNTER — PATIENT MESSAGE (OUTPATIENT)
Dept: OBSTETRICS AND GYNECOLOGY | Facility: CLINIC | Age: 25
End: 2020-09-25

## 2020-09-25 RX ORDER — ONDANSETRON 4 MG/1
4 TABLET, FILM COATED ORAL DAILY PRN
Qty: 30 TABLET | Refills: 1 | Status: SHIPPED | OUTPATIENT
Start: 2020-09-25 | End: 2021-07-19

## 2020-10-05 ENCOUNTER — PATIENT MESSAGE (OUTPATIENT)
Dept: ADMINISTRATIVE | Facility: OTHER | Age: 25
End: 2020-10-05

## 2020-10-07 ENCOUNTER — ROUTINE PRENATAL (OUTPATIENT)
Dept: OBSTETRICS AND GYNECOLOGY | Facility: CLINIC | Age: 25
End: 2020-10-07
Payer: MEDICAID

## 2020-10-07 VITALS
WEIGHT: 159.38 LBS | SYSTOLIC BLOOD PRESSURE: 120 MMHG | BODY MASS INDEX: 29.15 KG/M2 | DIASTOLIC BLOOD PRESSURE: 82 MMHG

## 2020-10-07 DIAGNOSIS — O09.291 HISTORY OF PRE-ECLAMPSIA IN PRIOR PREGNANCY, CURRENTLY PREGNANT IN FIRST TRIMESTER: Primary | ICD-10-CM

## 2020-10-07 DIAGNOSIS — N89.8 VAGINAL DISCHARGE: ICD-10-CM

## 2020-10-07 DIAGNOSIS — Z3A.10 10 WEEKS GESTATION OF PREGNANCY: ICD-10-CM

## 2020-10-07 PROCEDURE — 99213 PR OFFICE/OUTPT VISIT, EST, LEVL III, 20-29 MIN: ICD-10-PCS | Mod: TH,S$PBB,, | Performed by: OBSTETRICS & GYNECOLOGY

## 2020-10-07 PROCEDURE — 99999 PR PBB SHADOW E&M-EST. PATIENT-LVL III: ICD-10-PCS | Mod: PBBFAC,,, | Performed by: OBSTETRICS & GYNECOLOGY

## 2020-10-07 PROCEDURE — 99213 OFFICE O/P EST LOW 20 MIN: CPT | Mod: PBBFAC,TH | Performed by: OBSTETRICS & GYNECOLOGY

## 2020-10-07 PROCEDURE — 87510 GARDNER VAG DNA DIR PROBE: CPT

## 2020-10-07 PROCEDURE — 99999 PR PBB SHADOW E&M-EST. PATIENT-LVL III: CPT | Mod: PBBFAC,,, | Performed by: OBSTETRICS & GYNECOLOGY

## 2020-10-07 PROCEDURE — 99213 OFFICE O/P EST LOW 20 MIN: CPT | Mod: TH,S$PBB,, | Performed by: OBSTETRICS & GYNECOLOGY

## 2020-10-07 PROCEDURE — 87480 CANDIDA DNA DIR PROBE: CPT

## 2020-10-07 NOTE — PROGRESS NOTES
10w6d  Pt reports no complaints  Denies contractions, vaginal bleeding, or leakage of fluid.   Connected Mom orders signed 2020  Trisomy screening:undecided - discussed Maternity 21  F/U 4 weeks. Next visit: routine care     Indications for low-dose aspirin use for the prevention of pre-eclampsia reviewed.  For this patient, her high risk factors include history of pre-eclampsia.   Her moderate risk factors include:  Sociodemographic characteristics: Black or .   Patient is a candidate for low-dose aspirin and will begin taking it 12w and discontinue with onset of labor, or 3-4 days prior to scheduled .

## 2020-10-08 ENCOUNTER — PATIENT MESSAGE (OUTPATIENT)
Dept: OBSTETRICS AND GYNECOLOGY | Facility: CLINIC | Age: 25
End: 2020-10-08

## 2020-10-08 DIAGNOSIS — O09.291 HISTORY OF PRE-ECLAMPSIA IN PRIOR PREGNANCY, CURRENTLY PREGNANT IN FIRST TRIMESTER: Primary | ICD-10-CM

## 2020-10-10 LAB
CANDIDA RRNA VAG QL PROBE: NOT DETECTED
G VAGINALIS RRNA GENITAL QL PROBE: DETECTED
T VAGINALIS RRNA GENITAL QL PROBE: NOT DETECTED

## 2020-10-12 ENCOUNTER — PATIENT MESSAGE (OUTPATIENT)
Dept: OBSTETRICS AND GYNECOLOGY | Facility: CLINIC | Age: 25
End: 2020-10-12

## 2020-10-12 DIAGNOSIS — N76.0 BV (BACTERIAL VAGINOSIS): ICD-10-CM

## 2020-10-12 DIAGNOSIS — O09.291 HISTORY OF PRE-ECLAMPSIA IN PRIOR PREGNANCY, CURRENTLY PREGNANT IN FIRST TRIMESTER: Primary | ICD-10-CM

## 2020-10-12 DIAGNOSIS — B96.89 BV (BACTERIAL VAGINOSIS): ICD-10-CM

## 2020-10-12 RX ORDER — METRONIDAZOLE 7.5 MG/G
GEL VAGINAL
Qty: 70 G | Refills: 4 | Status: ON HOLD | OUTPATIENT
Start: 2020-10-12 | End: 2021-04-11 | Stop reason: HOSPADM

## 2020-10-12 RX ORDER — NAPROXEN SODIUM 220 MG/1
81 TABLET, FILM COATED ORAL DAILY
Qty: 60 TABLET | Refills: 5 | COMMUNITY
Start: 2020-10-12 | End: 2020-10-12 | Stop reason: SDUPTHER

## 2020-10-12 RX ORDER — NAPROXEN SODIUM 220 MG/1
81 TABLET, FILM COATED ORAL DAILY
Qty: 60 TABLET | Refills: 5 | Status: ON HOLD | COMMUNITY
Start: 2020-10-12 | End: 2021-04-11 | Stop reason: HOSPADM

## 2020-10-14 ENCOUNTER — PATIENT MESSAGE (OUTPATIENT)
Dept: OBSTETRICS AND GYNECOLOGY | Facility: CLINIC | Age: 25
End: 2020-10-14

## 2020-10-19 ENCOUNTER — PATIENT MESSAGE (OUTPATIENT)
Dept: OBSTETRICS AND GYNECOLOGY | Facility: CLINIC | Age: 25
End: 2020-10-19

## 2020-10-19 DIAGNOSIS — B37.9 YEAST INFECTION: Primary | ICD-10-CM

## 2020-10-30 RX ORDER — NYSTATIN 100000 U/G
CREAM TOPICAL 2 TIMES DAILY
Qty: 30 G | Refills: 1 | Status: ON HOLD | OUTPATIENT
Start: 2020-10-30 | End: 2021-04-11 | Stop reason: HOSPADM

## 2020-10-30 RX ORDER — TRIAMCINOLONE ACETONIDE 0.25 MG/G
CREAM TOPICAL 2 TIMES DAILY
Qty: 80 G | Refills: 1 | Status: ON HOLD | OUTPATIENT
Start: 2020-10-30 | End: 2021-04-11 | Stop reason: HOSPADM

## 2020-11-01 ENCOUNTER — HOSPITAL ENCOUNTER (EMERGENCY)
Facility: OTHER | Age: 25
Discharge: HOME | End: 2020-11-01
Attending: EMERGENCY MEDICINE
Payer: MEDICAID

## 2020-11-01 VITALS
DIASTOLIC BLOOD PRESSURE: 80 MMHG | TEMPERATURE: 98 F | BODY MASS INDEX: 29.44 KG/M2 | SYSTOLIC BLOOD PRESSURE: 115 MMHG | HEIGHT: 62 IN | RESPIRATION RATE: 18 BRPM | WEIGHT: 160 LBS | HEART RATE: 84 BPM | OXYGEN SATURATION: 98 %

## 2020-11-01 DIAGNOSIS — B37.31 VAGINAL CANDIDIASIS: Primary | ICD-10-CM

## 2020-11-01 LAB
B-HCG UR QL: POSITIVE
BACTERIA #/AREA URNS HPF: NORMAL /HPF
BACTERIA GENITAL QL WET PREP: ABNORMAL
BILIRUB UR QL STRIP: NEGATIVE
CLARITY UR: CLEAR
CLUE CELLS VAG QL WET PREP: ABNORMAL
COLOR UR: YELLOW
CTP QC/QA: YES
FILAMENT FUNGI VAG WET PREP-#/AREA: ABNORMAL
GLUCOSE UR QL STRIP: NEGATIVE
HGB UR QL STRIP: NEGATIVE
KETONES UR QL STRIP: NEGATIVE
LEUKOCYTE ESTERASE UR QL STRIP: ABNORMAL
MICROSCOPIC COMMENT: NORMAL
NITRITE UR QL STRIP: NEGATIVE
PH UR STRIP: 7 [PH] (ref 5–8)
PROT UR QL STRIP: ABNORMAL
RBC #/AREA URNS HPF: 3 /HPF (ref 0–4)
SP GR UR STRIP: 1.02 (ref 1–1.03)
SPECIMEN SOURCE: ABNORMAL
SQUAMOUS #/AREA URNS HPF: 3 /HPF
T VAGINALIS GENITAL QL WET PREP: ABNORMAL
URN SPEC COLLECT METH UR: ABNORMAL
UROBILINOGEN UR STRIP-ACNC: ABNORMAL EU/DL
WBC #/AREA URNS HPF: 3 /HPF (ref 0–5)
WBC #/AREA VAG WET PREP: ABNORMAL
YEAST GENITAL QL WET PREP: ABNORMAL

## 2020-11-01 PROCEDURE — 81000 URINALYSIS NONAUTO W/SCOPE: CPT

## 2020-11-01 PROCEDURE — 87210 SMEAR WET MOUNT SALINE/INK: CPT

## 2020-11-01 PROCEDURE — 81025 URINE PREGNANCY TEST: CPT | Performed by: EMERGENCY MEDICINE

## 2020-11-01 PROCEDURE — 99284 EMERGENCY DEPT VISIT MOD MDM: CPT | Mod: 25

## 2020-11-01 RX ORDER — MICONAZOLE NITRATE 2 %
1 CREAM WITH APPLICATOR VAGINAL NIGHTLY
Qty: 1 KIT | Refills: 0 | Status: SHIPPED | OUTPATIENT
Start: 2020-11-01 | End: 2020-11-08

## 2020-11-01 NOTE — ED TRIAGE NOTES
Pt reports to ED with c/o itching in vaginal area x 2 weeks.  Pt states that she had hx of bacterial vaginosis, took the abx for that and developed a yeast infection. Pt states her OB told her to take OTC medications for that, which helped for a while but s/s returned. Pt also reports some  burining, and frequent urination.  Pt is 14 weeks pregnant.

## 2020-11-01 NOTE — ED PROVIDER NOTES
Encounter Date: 11/1/2020       History     Chief Complaint   Patient presents with    Vaginal Itching     x 1 wk. denies any fever       Patient patient is a 25-year-old female, she wanted approximately 14 weeks gestational age, presenting for vaginal itching.  The patient states she initially was diagnosed with bacterial vaginosis and treated with topical medication.  She states that this caused her to have yeast infection.  She states she was told to purchase over-the-counter Monistat she completed a 7 day course.  She states over the past 2 days she has had itching and discharge again.  She does not have a him to be seen by OBGYN until next week and does not feel like she can wait that long.This is the extent of the patient's complaints at this time.       The history is provided by the patient.     Review of patient's allergies indicates:  No Known Allergies  Past Medical History:   Diagnosis Date    Anemia      History reviewed. No pertinent surgical history.  Family History   Problem Relation Age of Onset    Breast cancer Neg Hx     Colon cancer Neg Hx     Ovarian cancer Neg Hx      Social History     Tobacco Use    Smoking status: Never Smoker   Substance Use Topics    Alcohol use: No     Frequency: Never     Drinks per session: Patient refused     Binge frequency: Never    Drug use: No     Review of Systems   Constitutional: Negative for activity change, appetite change, chills, fatigue and fever.   HENT: Negative for congestion, rhinorrhea and sore throat.    Eyes: Negative for photophobia and visual disturbance.   Respiratory: Negative for cough, shortness of breath and wheezing.    Cardiovascular: Negative for chest pain.   Gastrointestinal: Negative for abdominal pain, diarrhea, nausea and vomiting.   Genitourinary: Positive for vaginal discharge. Negative for dysuria, hematuria and urgency.        Vaginal itching   Musculoskeletal: Negative for back pain, myalgias and neck pain.   Skin:  Negative for color change and wound.   Neurological: Negative for weakness and headaches.   Psychiatric/Behavioral: Negative for agitation and confusion.       Physical Exam     Initial Vitals [11/01/20 1335]   BP Pulse Resp Temp SpO2   119/64 107 18 98.3 °F (36.8 °C) 100 %      MAP       --         Physical Exam    Nursing note and vitals reviewed.  Constitutional: Vital signs are normal. She appears well-developed and well-nourished. She is not diaphoretic. She is cooperative.  Non-toxic appearance. She does not have a sickly appearance. She does not appear ill. No distress.   Well-appearing,  female accompanied the emergency room.  Speaking clearly full sentences.  No acute distress.   HENT:   Head: Normocephalic and atraumatic.   Right Ear: External ear normal.   Left Ear: External ear normal.   Nose: Nose normal.   Mouth/Throat: Oropharynx is clear and moist.   Eyes: Conjunctivae and EOM are normal.   Neck: Normal range of motion. Neck supple.   Cardiovascular: Normal rate.   Pulmonary/Chest: No respiratory distress.   Abdominal: Soft. Bowel sounds are normal. She exhibits no distension. There is no abdominal tenderness. There is no rebound and no guarding.   Genitourinary:    Genitourinary Comments: Chaperone present.  Normal appearance of the external genitalia, no skin lesions, erythema or masses. Erythematous vaginal mucosa with white, thick, discharge noted. Cervix pink . Cervical os is closed. No CMT, adnexal tenderness.      Musculoskeletal: Normal range of motion.   Neurological: She is alert and oriented to person, place, and time.   Skin: Skin is warm.   Psychiatric: She has a normal mood and affect. Her behavior is normal. Judgment and thought content normal.         ED Course   Procedures  Labs Reviewed   URINALYSIS, REFLEX TO URINE CULTURE - Abnormal; Notable for the following components:       Result Value    Protein, UA Trace (*)     Urobilinogen, UA 2.0-3.0 (*)     Leukocytes, UA  1+ (*)     All other components within normal limits    Narrative:     Specimen Source->Urine   POCT URINE PREGNANCY - Abnormal; Notable for the following components:    POC Preg Test, Ur Positive (*)     All other components within normal limits   URINALYSIS MICROSCOPIC    Narrative:     Specimen Source->Urine   VAGINAL SCREEN               Medical Decision Making:   Initial Assessment:     Urgent evaluation a 25 old female,  approximately 14 weeks gestational age, presenting for vaginal itching and discharge.  Patient is afebrile, nontoxic appearing, hemodynamically stable.  Physical exam reveals soft, nontender abdomen.  Will plan for vaginal exam and reassess.    Clinical Tests:   Lab Tests: Ordered and Reviewed  ED Management:    Vaginal exam reveals erythematous vaginal mucosa and thick white discharge consistent with yeast.    Will try a 7 day course of miconazole for pregnancy yeast infection.  Counseled the patient in close follow-up with OBGYN.  Discharged home in stable condition.The patient was instructed to follow up with a primary care provider in 2 days or to return to the emergency department for worsening symptoms. The treatment plan was discussed with the patient who demonstrated understanding and comfort with plan.      This note was created using [employer] renal Fluency Direct. There may be typographical errors secondary to dictation.                                Clinical Impression:     ICD-10-CM ICD-9-CM   1. Vaginal candidiasis  B37.3 112.1                          ED Disposition Condition    Discharge Stable        ED Prescriptions     Medication Sig Dispense Start Date End Date Auth. Provider    miconazole (MICOTIN) 2 % vaginal cream Place 1 applicator vaginally every evening. for 7 days 1 kit 2020 Pamela Nevarez PA-C        Follow-up Information     Follow up With Specialties Details Why Contact Info    Yani Koroma MD Obstetrics and Gynecology Schedule an appointment as  soon as possible for a visit   [address]  [address]  [place] [address]  [phone]      [facility]  If symptoms worsen [address]  [facility] [id number] [phone]                                       Pamela Nevarez PA-C  11/01/20 1606

## 2020-11-09 ENCOUNTER — ROUTINE PRENATAL (OUTPATIENT)
Dept: OBSTETRICS AND GYNECOLOGY | Facility: CLINIC | Age: 25
End: 2020-11-09
Payer: MEDICAID

## 2020-11-09 VITALS
SYSTOLIC BLOOD PRESSURE: 122 MMHG | BODY MASS INDEX: 29.19 KG/M2 | DIASTOLIC BLOOD PRESSURE: 70 MMHG | WEIGHT: 159.63 LBS

## 2020-11-09 DIAGNOSIS — Z3A.15 15 WEEKS GESTATION OF PREGNANCY: ICD-10-CM

## 2020-11-09 DIAGNOSIS — O09.291 HISTORY OF PRE-ECLAMPSIA IN PRIOR PREGNANCY, CURRENTLY PREGNANT IN FIRST TRIMESTER: Primary | ICD-10-CM

## 2020-11-09 PROCEDURE — 99213 PR OFFICE/OUTPT VISIT, EST, LEVL III, 20-29 MIN: ICD-10-PCS | Mod: TH,S$PBB,, | Performed by: OBSTETRICS & GYNECOLOGY

## 2020-11-09 PROCEDURE — 99213 OFFICE O/P EST LOW 20 MIN: CPT | Mod: TH,S$PBB,, | Performed by: OBSTETRICS & GYNECOLOGY

## 2020-11-09 PROCEDURE — 99999 PR PBB SHADOW E&M-EST. PATIENT-LVL III: CPT | Mod: PBBFAC,,, | Performed by: OBSTETRICS & GYNECOLOGY

## 2020-11-09 PROCEDURE — 99213 OFFICE O/P EST LOW 20 MIN: CPT | Mod: PBBFAC,TH | Performed by: OBSTETRICS & GYNECOLOGY

## 2020-11-09 PROCEDURE — 99999 PR PBB SHADOW E&M-EST. PATIENT-LVL III: ICD-10-PCS | Mod: PBBFAC,,, | Performed by: OBSTETRICS & GYNECOLOGY

## 2020-11-09 NOTE — PROGRESS NOTES
15w4d Pt reports some on and off nausea.   Improved with medication.  Denies contractions, vaginal bleeding, or leakage of fluid.   Connected Mom orders signed 9/4/2020  Trisomy screening:Maternity 21 nml  F/U 4 weeks. Next visit: routine care

## 2020-11-11 ENCOUNTER — PATIENT MESSAGE (OUTPATIENT)
Dept: OBSTETRICS AND GYNECOLOGY | Facility: CLINIC | Age: 25
End: 2020-11-11

## 2020-11-11 DIAGNOSIS — B37.9 YEAST INFECTION: Primary | ICD-10-CM

## 2020-11-12 ENCOUNTER — PATIENT MESSAGE (OUTPATIENT)
Dept: OBSTETRICS AND GYNECOLOGY | Facility: CLINIC | Age: 25
End: 2020-11-12

## 2020-11-12 DIAGNOSIS — B37.9 YEAST INFECTION: Primary | ICD-10-CM

## 2020-11-12 RX ORDER — TERCONAZOLE 8 MG/G
1 CREAM VAGINAL NIGHTLY
Qty: 20 G | Refills: 0 | Status: SHIPPED | OUTPATIENT
Start: 2020-11-12 | End: 2020-11-15

## 2020-11-18 ENCOUNTER — PATIENT MESSAGE (OUTPATIENT)
Dept: OBSTETRICS AND GYNECOLOGY | Facility: CLINIC | Age: 25
End: 2020-11-18

## 2020-11-18 RX ORDER — FLUCONAZOLE 150 MG/1
150 TABLET ORAL DAILY
Qty: 1 TABLET | Refills: 0 | Status: SHIPPED | OUTPATIENT
Start: 2020-11-18 | End: 2020-11-19

## 2020-11-22 ENCOUNTER — PATIENT MESSAGE (OUTPATIENT)
Dept: OBSTETRICS AND GYNECOLOGY | Facility: CLINIC | Age: 25
End: 2020-11-22

## 2020-11-23 ENCOUNTER — PATIENT MESSAGE (OUTPATIENT)
Dept: OBSTETRICS AND GYNECOLOGY | Facility: CLINIC | Age: 25
End: 2020-11-23

## 2020-11-23 ENCOUNTER — ROUTINE PRENATAL (OUTPATIENT)
Dept: OBSTETRICS AND GYNECOLOGY | Facility: CLINIC | Age: 25
End: 2020-11-23
Payer: MEDICAID

## 2020-11-23 ENCOUNTER — TELEPHONE (OUTPATIENT)
Dept: OBSTETRICS AND GYNECOLOGY | Facility: CLINIC | Age: 25
End: 2020-11-23

## 2020-11-23 VITALS
DIASTOLIC BLOOD PRESSURE: 70 MMHG | WEIGHT: 160.25 LBS | SYSTOLIC BLOOD PRESSURE: 120 MMHG | BODY MASS INDEX: 29.31 KG/M2

## 2020-11-23 DIAGNOSIS — O09.291 HISTORY OF PRE-ECLAMPSIA IN PRIOR PREGNANCY, CURRENTLY PREGNANT IN FIRST TRIMESTER: Primary | ICD-10-CM

## 2020-11-23 DIAGNOSIS — B37.9 YEAST INFECTION: ICD-10-CM

## 2020-11-23 PROCEDURE — 99213 PR OFFICE/OUTPT VISIT, EST, LEVL III, 20-29 MIN: ICD-10-PCS | Mod: TH,S$PBB,, | Performed by: OBSTETRICS & GYNECOLOGY

## 2020-11-23 PROCEDURE — 99999 PR PBB SHADOW E&M-EST. PATIENT-LVL III: ICD-10-PCS | Mod: PBBFAC,,, | Performed by: OBSTETRICS & GYNECOLOGY

## 2020-11-23 PROCEDURE — 99999 PR PBB SHADOW E&M-EST. PATIENT-LVL III: CPT | Mod: PBBFAC,,, | Performed by: OBSTETRICS & GYNECOLOGY

## 2020-11-23 PROCEDURE — 99213 OFFICE O/P EST LOW 20 MIN: CPT | Mod: TH,S$PBB,, | Performed by: OBSTETRICS & GYNECOLOGY

## 2020-11-23 PROCEDURE — 87510 GARDNER VAG DNA DIR PROBE: CPT

## 2020-11-23 PROCEDURE — 87480 CANDIDA DNA DIR PROBE: CPT

## 2020-11-23 PROCEDURE — 87481 CANDIDA DNA AMP PROBE: CPT | Mod: 59

## 2020-11-23 PROCEDURE — 99213 OFFICE O/P EST LOW 20 MIN: CPT | Mod: PBBFAC,TH | Performed by: OBSTETRICS & GYNECOLOGY

## 2020-11-23 PROCEDURE — 87801 DETECT AGNT MULT DNA AMPLI: CPT

## 2020-11-23 RX ORDER — CLOTRIMAZOLE 1 %
CREAM (GRAM) TOPICAL
Qty: 30 G | Refills: 1 | Status: ON HOLD | OUTPATIENT
Start: 2020-11-23 | End: 2021-04-11 | Stop reason: HOSPADM

## 2020-11-23 RX ORDER — TRIAMCINOLONE ACETONIDE 0.25 MG/G
CREAM TOPICAL 2 TIMES DAILY
Qty: 80 G | Refills: 1 | Status: ON HOLD | OUTPATIENT
Start: 2020-11-23 | End: 2021-04-11 | Stop reason: HOSPADM

## 2020-11-23 NOTE — TELEPHONE ENCOUNTER
Spoke with pt pt is in office        ----- Message from Aye Shabazz sent at 11/23/2020  2:15 PM CST -----  Type: Patient Call Back       What is the request in detail:  pt calling to speak to a nurse regarding yeast infection      Can the clinic reply by [facility]? No       Would the patient rather a call back or a response via [facility]? Call back       Best call back number: [phone]        Thank you.

## 2020-11-23 NOTE — PROGRESS NOTES
Patient is here for continued symptoms of yeast infection.    She has tried Nystatin, Diflucan, Teraconazole.  None of these have worked.    SSE consistent with yeast infection.    1. History of pre-eclampsia in prior pregnancy, currently pregnant in first trimester    2. Yeast infection  - clotrimazole (LOTRIMIN) 1 % cream; Apply to affected area 2 times daily  Dispense: 30 g; Refill: 1  - triamcinolone acetonide 0.025% (KENALOG) 0.025 % cream; Apply topically 2 (two) times daily.  Dispense: 80 g; Refill: 1  - Vaginosis Screen by DNA Probe

## 2020-11-26 LAB
CANDIDA RRNA VAG QL PROBE: POSITIVE
G VAGINALIS RRNA GENITAL QL PROBE: POSITIVE
T VAGINALIS RRNA GENITAL QL PROBE: NEGATIVE

## 2020-12-09 DIAGNOSIS — Z36.89 ENCOUNTER FOR FETAL ANATOMIC SURVEY: Primary | ICD-10-CM

## 2020-12-10 ENCOUNTER — ROUTINE PRENATAL (OUTPATIENT)
Dept: OBSTETRICS AND GYNECOLOGY | Facility: CLINIC | Age: 25
End: 2020-12-10
Payer: MEDICAID

## 2020-12-10 ENCOUNTER — PROCEDURE VISIT (OUTPATIENT)
Dept: MATERNAL FETAL MEDICINE | Facility: CLINIC | Age: 25
End: 2020-12-10
Payer: MEDICAID

## 2020-12-10 VITALS
WEIGHT: 164.44 LBS | DIASTOLIC BLOOD PRESSURE: 72 MMHG | SYSTOLIC BLOOD PRESSURE: 122 MMHG | BODY MASS INDEX: 30.08 KG/M2

## 2020-12-10 DIAGNOSIS — Z3A.20 20 WEEKS GESTATION OF PREGNANCY: ICD-10-CM

## 2020-12-10 DIAGNOSIS — O09.292 HISTORY OF PRE-ECLAMPSIA IN PRIOR PREGNANCY, CURRENTLY PREGNANT IN SECOND TRIMESTER: Primary | ICD-10-CM

## 2020-12-10 DIAGNOSIS — Z36.89 ENCOUNTER FOR FETAL ANATOMIC SURVEY: ICD-10-CM

## 2020-12-10 PROCEDURE — 99213 OFFICE O/P EST LOW 20 MIN: CPT | Mod: TH,S$PBB,, | Performed by: OBSTETRICS & GYNECOLOGY

## 2020-12-10 PROCEDURE — 99999 PR PBB SHADOW E&M-EST. PATIENT-LVL III: CPT | Mod: PBBFAC,,, | Performed by: OBSTETRICS & GYNECOLOGY

## 2020-12-10 PROCEDURE — 76805 OB US >/= 14 WKS SNGL FETUS: CPT | Mod: 26,S$PBB,, | Performed by: OBSTETRICS & GYNECOLOGY

## 2020-12-10 PROCEDURE — 76805 PR US, OB 14+WKS, TRANSABD, SINGLE GESTATION: ICD-10-PCS | Mod: 26,S$PBB,, | Performed by: OBSTETRICS & GYNECOLOGY

## 2020-12-10 PROCEDURE — 99213 PR OFFICE/OUTPT VISIT, EST, LEVL III, 20-29 MIN: ICD-10-PCS | Mod: TH,S$PBB,, | Performed by: OBSTETRICS & GYNECOLOGY

## 2020-12-10 PROCEDURE — 99999 PR PBB SHADOW E&M-EST. PATIENT-LVL III: ICD-10-PCS | Mod: PBBFAC,,, | Performed by: OBSTETRICS & GYNECOLOGY

## 2020-12-10 PROCEDURE — 76805 OB US >/= 14 WKS SNGL FETUS: CPT | Mod: PBBFAC,PO | Performed by: OBSTETRICS & GYNECOLOGY

## 2020-12-10 PROCEDURE — 99213 OFFICE O/P EST LOW 20 MIN: CPT | Mod: PBBFAC,TH,25 | Performed by: OBSTETRICS & GYNECOLOGY

## 2021-01-08 ENCOUNTER — PROCEDURE VISIT (OUTPATIENT)
Dept: MATERNAL FETAL MEDICINE | Facility: CLINIC | Age: 26
End: 2021-01-08
Payer: MEDICAID

## 2021-01-08 PROCEDURE — 76816 OB US FOLLOW-UP PER FETUS: CPT | Mod: PBBFAC,PO | Performed by: OBSTETRICS & GYNECOLOGY

## 2021-01-08 PROCEDURE — 76816 PR  US,PREGNANT UTERUS,F/U,TRANSABD APP: ICD-10-PCS | Mod: 26,S$PBB,, | Performed by: OBSTETRICS & GYNECOLOGY

## 2021-01-08 PROCEDURE — 76816 OB US FOLLOW-UP PER FETUS: CPT | Mod: 26,S$PBB,, | Performed by: OBSTETRICS & GYNECOLOGY

## 2021-01-31 ENCOUNTER — NURSE TRIAGE (OUTPATIENT)
Dept: ADMINISTRATIVE | Facility: CLINIC | Age: 26
End: 2021-01-31

## 2021-02-01 ENCOUNTER — PATIENT MESSAGE (OUTPATIENT)
Dept: OBSTETRICS AND GYNECOLOGY | Facility: CLINIC | Age: 26
End: 2021-02-01

## 2021-02-05 ENCOUNTER — TELEPHONE (OUTPATIENT)
Dept: OBSTETRICS AND GYNECOLOGY | Facility: CLINIC | Age: 26
End: 2021-02-05

## 2021-02-08 ENCOUNTER — ROUTINE PRENATAL (OUTPATIENT)
Dept: OBSTETRICS AND GYNECOLOGY | Facility: CLINIC | Age: 26
End: 2021-02-08
Payer: MEDICAID

## 2021-02-08 VITALS
WEIGHT: 168.19 LBS | BODY MASS INDEX: 30.77 KG/M2 | DIASTOLIC BLOOD PRESSURE: 78 MMHG | SYSTOLIC BLOOD PRESSURE: 118 MMHG

## 2021-02-08 DIAGNOSIS — O09.293 HISTORY OF PRE-ECLAMPSIA IN PRIOR PREGNANCY, CURRENTLY PREGNANT IN THIRD TRIMESTER: Primary | ICD-10-CM

## 2021-02-08 DIAGNOSIS — Z3A.28 28 WEEKS GESTATION OF PREGNANCY: ICD-10-CM

## 2021-02-08 PROCEDURE — 99213 PR OFFICE/OUTPT VISIT, EST, LEVL III, 20-29 MIN: ICD-10-PCS | Mod: TH,S$PBB,, | Performed by: OBSTETRICS & GYNECOLOGY

## 2021-02-08 PROCEDURE — 99999 PR PBB SHADOW E&M-EST. PATIENT-LVL III: ICD-10-PCS | Mod: PBBFAC,,, | Performed by: OBSTETRICS & GYNECOLOGY

## 2021-02-08 PROCEDURE — 99213 OFFICE O/P EST LOW 20 MIN: CPT | Mod: TH,S$PBB,, | Performed by: OBSTETRICS & GYNECOLOGY

## 2021-02-08 PROCEDURE — 99213 OFFICE O/P EST LOW 20 MIN: CPT | Mod: PBBFAC,TH | Performed by: OBSTETRICS & GYNECOLOGY

## 2021-02-08 PROCEDURE — 99999 PR PBB SHADOW E&M-EST. PATIENT-LVL III: CPT | Mod: PBBFAC,,, | Performed by: OBSTETRICS & GYNECOLOGY

## 2021-02-09 ENCOUNTER — LAB VISIT (OUTPATIENT)
Dept: LAB | Facility: HOSPITAL | Age: 26
End: 2021-02-09
Attending: OBSTETRICS & GYNECOLOGY
Payer: MEDICAID

## 2021-02-09 DIAGNOSIS — O09.293 HISTORY OF PRE-ECLAMPSIA IN PRIOR PREGNANCY, CURRENTLY PREGNANT IN THIRD TRIMESTER: ICD-10-CM

## 2021-02-09 LAB
BASOPHILS # BLD AUTO: 0.03 K/UL (ref 0–0.2)
BASOPHILS NFR BLD: 0.5 % (ref 0–1.9)
DIFFERENTIAL METHOD: ABNORMAL
EOSINOPHIL # BLD AUTO: 0.1 K/UL (ref 0–0.5)
EOSINOPHIL NFR BLD: 1.4 % (ref 0–8)
ERYTHROCYTE [DISTWIDTH] IN BLOOD BY AUTOMATED COUNT: 15 % (ref 11.5–14.5)
GLUCOSE SERPL-MCNC: 125 MG/DL (ref 70–140)
HCT VFR BLD AUTO: 25 % (ref 37–48.5)
HGB BLD-MCNC: 7.6 G/DL (ref 12–16)
IMM GRANULOCYTES # BLD AUTO: 0.02 K/UL (ref 0–0.04)
IMM GRANULOCYTES NFR BLD AUTO: 0.3 % (ref 0–0.5)
LYMPHOCYTES # BLD AUTO: 1.1 K/UL (ref 1–4.8)
LYMPHOCYTES NFR BLD: 17.5 % (ref 18–48)
MCH RBC QN AUTO: 22.8 PG (ref 27–31)
MCHC RBC AUTO-ENTMCNC: 30.4 G/DL (ref 32–36)
MCV RBC AUTO: 75 FL (ref 82–98)
MONOCYTES # BLD AUTO: 0.4 K/UL (ref 0.3–1)
MONOCYTES NFR BLD: 7 % (ref 4–15)
NEUTROPHILS # BLD AUTO: 4.6 K/UL (ref 1.8–7.7)
NEUTROPHILS NFR BLD: 73.3 % (ref 38–73)
NRBC BLD-RTO: 0 /100 WBC
PLATELET # BLD AUTO: 307 K/UL (ref 150–350)
PMV BLD AUTO: 9.7 FL (ref 9.2–12.9)
RBC # BLD AUTO: 3.34 M/UL (ref 4–5.4)
WBC # BLD AUTO: 6.27 K/UL (ref 3.9–12.7)

## 2021-02-09 PROCEDURE — 82950 GLUCOSE TEST: CPT

## 2021-02-09 PROCEDURE — 87340 HEPATITIS B SURFACE AG IA: CPT

## 2021-02-09 PROCEDURE — 85025 COMPLETE CBC W/AUTO DIFF WBC: CPT

## 2021-02-09 PROCEDURE — 36415 COLL VENOUS BLD VENIPUNCTURE: CPT

## 2021-02-09 PROCEDURE — 86592 SYPHILIS TEST NON-TREP QUAL: CPT

## 2021-02-09 PROCEDURE — 86703 HIV-1/HIV-2 1 RESULT ANTBDY: CPT

## 2021-02-09 PROCEDURE — 86803 HEPATITIS C AB TEST: CPT

## 2021-02-10 ENCOUNTER — PATIENT MESSAGE (OUTPATIENT)
Dept: OBSTETRICS AND GYNECOLOGY | Facility: CLINIC | Age: 26
End: 2021-02-10

## 2021-02-10 DIAGNOSIS — O99.019 ANEMIA DURING PREGNANCY: Primary | ICD-10-CM

## 2021-02-10 LAB
HBV SURFACE AG SERPL QL IA: NEGATIVE
HCV AB SERPL QL IA: NEGATIVE
HIV 1+2 AB+HIV1 P24 AG SERPL QL IA: NEGATIVE

## 2021-02-10 RX ORDER — FERROUS SULFATE 325(65) MG
325 TABLET ORAL 2 TIMES DAILY
Qty: 60 TABLET | Refills: 11 | Status: ON HOLD | OUTPATIENT
Start: 2021-02-10 | End: 2021-04-11 | Stop reason: HOSPADM

## 2021-02-11 LAB — RPR SER QL: NORMAL

## 2021-02-25 ENCOUNTER — ROUTINE PRENATAL (OUTPATIENT)
Dept: OBSTETRICS AND GYNECOLOGY | Facility: CLINIC | Age: 26
End: 2021-02-25
Payer: MEDICAID

## 2021-02-25 ENCOUNTER — LAB VISIT (OUTPATIENT)
Dept: LAB | Facility: HOSPITAL | Age: 26
End: 2021-02-25
Attending: OBSTETRICS & GYNECOLOGY
Payer: MEDICAID

## 2021-02-25 VITALS
SYSTOLIC BLOOD PRESSURE: 118 MMHG | WEIGHT: 168.44 LBS | DIASTOLIC BLOOD PRESSURE: 62 MMHG | BODY MASS INDEX: 30.81 KG/M2

## 2021-02-25 DIAGNOSIS — O09.293 HISTORY OF PRE-ECLAMPSIA IN PRIOR PREGNANCY, CURRENTLY PREGNANT IN THIRD TRIMESTER: Primary | ICD-10-CM

## 2021-02-25 DIAGNOSIS — O99.019 ANEMIA DURING PREGNANCY: ICD-10-CM

## 2021-02-25 DIAGNOSIS — Z3A.31 31 WEEKS GESTATION OF PREGNANCY: ICD-10-CM

## 2021-02-25 LAB
BASOPHILS # BLD AUTO: 0.04 K/UL (ref 0–0.2)
BASOPHILS NFR BLD: 0.5 % (ref 0–1.9)
DIFFERENTIAL METHOD: ABNORMAL
EOSINOPHIL # BLD AUTO: 0.2 K/UL (ref 0–0.5)
EOSINOPHIL NFR BLD: 2.4 % (ref 0–8)
ERYTHROCYTE [DISTWIDTH] IN BLOOD BY AUTOMATED COUNT: 16.7 % (ref 11.5–14.5)
HCT VFR BLD AUTO: 26.1 % (ref 37–48.5)
HGB BLD-MCNC: 7.9 G/DL (ref 12–16)
IMM GRANULOCYTES # BLD AUTO: 0.02 K/UL (ref 0–0.04)
IMM GRANULOCYTES NFR BLD AUTO: 0.3 % (ref 0–0.5)
LYMPHOCYTES # BLD AUTO: 1.3 K/UL (ref 1–4.8)
LYMPHOCYTES NFR BLD: 16.3 % (ref 18–48)
MCH RBC QN AUTO: 22.8 PG (ref 27–31)
MCHC RBC AUTO-ENTMCNC: 30.3 G/DL (ref 32–36)
MCV RBC AUTO: 75 FL (ref 82–98)
MONOCYTES # BLD AUTO: 0.7 K/UL (ref 0.3–1)
MONOCYTES NFR BLD: 8.9 % (ref 4–15)
NEUTROPHILS # BLD AUTO: 5.7 K/UL (ref 1.8–7.7)
NEUTROPHILS NFR BLD: 71.6 % (ref 38–73)
NRBC BLD-RTO: 0 /100 WBC
PLATELET # BLD AUTO: 391 K/UL (ref 150–350)
PMV BLD AUTO: 9.9 FL (ref 9.2–12.9)
RBC # BLD AUTO: 3.46 M/UL (ref 4–5.4)
WBC # BLD AUTO: 7.96 K/UL (ref 3.9–12.7)

## 2021-02-25 PROCEDURE — 99213 OFFICE O/P EST LOW 20 MIN: CPT | Mod: PBBFAC,TH | Performed by: OBSTETRICS & GYNECOLOGY

## 2021-02-25 PROCEDURE — 99999 PR PBB SHADOW E&M-EST. PATIENT-LVL III: CPT | Mod: PBBFAC,,, | Performed by: OBSTETRICS & GYNECOLOGY

## 2021-02-25 PROCEDURE — 99999 PR PBB SHADOW E&M-EST. PATIENT-LVL III: ICD-10-PCS | Mod: PBBFAC,,, | Performed by: OBSTETRICS & GYNECOLOGY

## 2021-02-25 PROCEDURE — 36415 COLL VENOUS BLD VENIPUNCTURE: CPT

## 2021-02-25 PROCEDURE — 85025 COMPLETE CBC W/AUTO DIFF WBC: CPT

## 2021-02-25 PROCEDURE — 99213 PR OFFICE/OUTPT VISIT, EST, LEVL III, 20-29 MIN: ICD-10-PCS | Mod: TH,S$PBB,, | Performed by: OBSTETRICS & GYNECOLOGY

## 2021-02-25 PROCEDURE — 99213 OFFICE O/P EST LOW 20 MIN: CPT | Mod: TH,S$PBB,, | Performed by: OBSTETRICS & GYNECOLOGY

## 2021-02-26 ENCOUNTER — PATIENT MESSAGE (OUTPATIENT)
Dept: OBSTETRICS AND GYNECOLOGY | Facility: CLINIC | Age: 26
End: 2021-02-26

## 2021-03-04 ENCOUNTER — PATIENT MESSAGE (OUTPATIENT)
Dept: ADMINISTRATIVE | Facility: OTHER | Age: 26
End: 2021-03-04

## 2021-03-11 ENCOUNTER — PATIENT MESSAGE (OUTPATIENT)
Dept: OBSTETRICS AND GYNECOLOGY | Facility: CLINIC | Age: 26
End: 2021-03-11

## 2021-03-11 ENCOUNTER — LAB VISIT (OUTPATIENT)
Dept: LAB | Facility: HOSPITAL | Age: 26
End: 2021-03-11
Attending: OBSTETRICS & GYNECOLOGY
Payer: MEDICAID

## 2021-03-11 ENCOUNTER — ROUTINE PRENATAL (OUTPATIENT)
Dept: OBSTETRICS AND GYNECOLOGY | Facility: CLINIC | Age: 26
End: 2021-03-11
Payer: MEDICAID

## 2021-03-11 VITALS
DIASTOLIC BLOOD PRESSURE: 60 MMHG | BODY MASS INDEX: 30.89 KG/M2 | WEIGHT: 168.88 LBS | SYSTOLIC BLOOD PRESSURE: 116 MMHG

## 2021-03-11 DIAGNOSIS — O99.019 ANEMIA DURING PREGNANCY: ICD-10-CM

## 2021-03-11 DIAGNOSIS — Z3A.33 33 WEEKS GESTATION OF PREGNANCY: ICD-10-CM

## 2021-03-11 DIAGNOSIS — O09.293 HISTORY OF PRE-ECLAMPSIA IN PRIOR PREGNANCY, CURRENTLY PREGNANT IN THIRD TRIMESTER: Primary | ICD-10-CM

## 2021-03-11 LAB
BASOPHILS # BLD AUTO: 0.04 K/UL (ref 0–0.2)
BASOPHILS NFR BLD: 0.6 % (ref 0–1.9)
DIFFERENTIAL METHOD: ABNORMAL
EOSINOPHIL # BLD AUTO: 0.2 K/UL (ref 0–0.5)
EOSINOPHIL NFR BLD: 2.7 % (ref 0–8)
ERYTHROCYTE [DISTWIDTH] IN BLOOD BY AUTOMATED COUNT: 16.4 % (ref 11.5–14.5)
HCT VFR BLD AUTO: 25.7 % (ref 37–48.5)
HGB BLD-MCNC: 7.7 G/DL (ref 12–16)
IMM GRANULOCYTES # BLD AUTO: 0.01 K/UL (ref 0–0.04)
IMM GRANULOCYTES NFR BLD AUTO: 0.1 % (ref 0–0.5)
LYMPHOCYTES # BLD AUTO: 1.4 K/UL (ref 1–4.8)
LYMPHOCYTES NFR BLD: 20.5 % (ref 18–48)
MCH RBC QN AUTO: 22.1 PG (ref 27–31)
MCHC RBC AUTO-ENTMCNC: 30 G/DL (ref 32–36)
MCV RBC AUTO: 74 FL (ref 82–98)
MONOCYTES # BLD AUTO: 0.6 K/UL (ref 0.3–1)
MONOCYTES NFR BLD: 8.9 % (ref 4–15)
NEUTROPHILS # BLD AUTO: 4.6 K/UL (ref 1.8–7.7)
NEUTROPHILS NFR BLD: 67.2 % (ref 38–73)
NRBC BLD-RTO: 0 /100 WBC
PLATELET # BLD AUTO: 258 K/UL (ref 150–350)
PMV BLD AUTO: 9.6 FL (ref 9.2–12.9)
RBC # BLD AUTO: 3.48 M/UL (ref 4–5.4)
WBC # BLD AUTO: 6.77 K/UL (ref 3.9–12.7)

## 2021-03-11 PROCEDURE — 99999 PR PBB SHADOW E&M-EST. PATIENT-LVL III: ICD-10-PCS | Mod: PBBFAC,,, | Performed by: OBSTETRICS & GYNECOLOGY

## 2021-03-11 PROCEDURE — 85025 COMPLETE CBC W/AUTO DIFF WBC: CPT | Performed by: OBSTETRICS & GYNECOLOGY

## 2021-03-11 PROCEDURE — 99213 PR OFFICE/OUTPT VISIT, EST, LEVL III, 20-29 MIN: ICD-10-PCS | Mod: TH,S$PBB,, | Performed by: OBSTETRICS & GYNECOLOGY

## 2021-03-11 PROCEDURE — 99999 PR PBB SHADOW E&M-EST. PATIENT-LVL III: CPT | Mod: PBBFAC,,, | Performed by: OBSTETRICS & GYNECOLOGY

## 2021-03-11 PROCEDURE — 99213 OFFICE O/P EST LOW 20 MIN: CPT | Mod: TH,S$PBB,, | Performed by: OBSTETRICS & GYNECOLOGY

## 2021-03-11 PROCEDURE — 36415 COLL VENOUS BLD VENIPUNCTURE: CPT | Performed by: OBSTETRICS & GYNECOLOGY

## 2021-03-11 PROCEDURE — 99213 OFFICE O/P EST LOW 20 MIN: CPT | Mod: PBBFAC,TH | Performed by: OBSTETRICS & GYNECOLOGY

## 2021-03-22 ENCOUNTER — ROUTINE PRENATAL (OUTPATIENT)
Dept: OBSTETRICS AND GYNECOLOGY | Facility: CLINIC | Age: 26
End: 2021-03-22
Payer: MEDICAID

## 2021-03-22 ENCOUNTER — PATIENT MESSAGE (OUTPATIENT)
Dept: OBSTETRICS AND GYNECOLOGY | Facility: CLINIC | Age: 26
End: 2021-03-22

## 2021-03-22 VITALS
SYSTOLIC BLOOD PRESSURE: 118 MMHG | WEIGHT: 167.56 LBS | BODY MASS INDEX: 30.65 KG/M2 | DIASTOLIC BLOOD PRESSURE: 62 MMHG

## 2021-03-22 DIAGNOSIS — O99.019 ANEMIA DURING PREGNANCY: ICD-10-CM

## 2021-03-22 DIAGNOSIS — O09.293 HISTORY OF PRE-ECLAMPSIA IN PRIOR PREGNANCY, CURRENTLY PREGNANT IN THIRD TRIMESTER: Primary | ICD-10-CM

## 2021-03-22 DIAGNOSIS — Z3A.34 34 WEEKS GESTATION OF PREGNANCY: ICD-10-CM

## 2021-03-22 PROCEDURE — 99213 OFFICE O/P EST LOW 20 MIN: CPT | Mod: TH,S$PBB,, | Performed by: OBSTETRICS & GYNECOLOGY

## 2021-03-22 PROCEDURE — 99999 PR PBB SHADOW E&M-EST. PATIENT-LVL III: CPT | Mod: PBBFAC,,, | Performed by: OBSTETRICS & GYNECOLOGY

## 2021-03-22 PROCEDURE — 99999 PR PBB SHADOW E&M-EST. PATIENT-LVL III: ICD-10-PCS | Mod: PBBFAC,,, | Performed by: OBSTETRICS & GYNECOLOGY

## 2021-03-22 PROCEDURE — 99213 PR OFFICE/OUTPT VISIT, EST, LEVL III, 20-29 MIN: ICD-10-PCS | Mod: TH,S$PBB,, | Performed by: OBSTETRICS & GYNECOLOGY

## 2021-03-22 PROCEDURE — 99213 OFFICE O/P EST LOW 20 MIN: CPT | Mod: PBBFAC,TH | Performed by: OBSTETRICS & GYNECOLOGY

## 2021-04-04 ENCOUNTER — HOSPITAL ENCOUNTER (EMERGENCY)
Facility: OTHER | Age: 26
Discharge: HOME | End: 2021-04-04
Attending: OBSTETRICS & GYNECOLOGY
Payer: MEDICAID

## 2021-04-04 VITALS
DIASTOLIC BLOOD PRESSURE: 91 MMHG | TEMPERATURE: 98 F | SYSTOLIC BLOOD PRESSURE: 148 MMHG | WEIGHT: 167.56 LBS | RESPIRATION RATE: 16 BRPM | BODY MASS INDEX: 30.83 KG/M2 | OXYGEN SATURATION: 100 % | HEIGHT: 62 IN | HEART RATE: 82 BPM

## 2021-04-04 DIAGNOSIS — R03.0 ELEVATED BLOOD PRESSURE READING: Primary | ICD-10-CM

## 2021-04-04 DIAGNOSIS — Z3A.36 36 WEEKS GESTATION OF PREGNANCY: ICD-10-CM

## 2021-04-04 DIAGNOSIS — E87.6 HYPOKALEMIA: ICD-10-CM

## 2021-04-04 LAB
ALBUMIN SERPL BCP-MCNC: 2.6 G/DL (ref 3.5–5.2)
ALP SERPL-CCNC: 191 U/L (ref 55–135)
ALT SERPL W/O P-5'-P-CCNC: 8 U/L (ref 10–44)
ANION GAP SERPL CALC-SCNC: 11 MMOL/L (ref 8–16)
AST SERPL-CCNC: 16 U/L (ref 10–40)
BASOPHILS # BLD AUTO: 0.04 K/UL (ref 0–0.2)
BASOPHILS NFR BLD: 0.6 % (ref 0–1.9)
BILIRUB SERPL-MCNC: 0.8 MG/DL (ref 0.1–1)
BUN SERPL-MCNC: 5 MG/DL (ref 6–20)
CALCIUM SERPL-MCNC: 8.4 MG/DL (ref 8.7–10.5)
CHLORIDE SERPL-SCNC: 107 MMOL/L (ref 95–110)
CO2 SERPL-SCNC: 21 MMOL/L (ref 23–29)
CREAT SERPL-MCNC: 0.6 MG/DL (ref 0.5–1.4)
CREAT UR-MCNC: 116.6 MG/DL (ref 15–325)
DIFFERENTIAL METHOD: ABNORMAL
EOSINOPHIL # BLD AUTO: 0.1 K/UL (ref 0–0.5)
EOSINOPHIL NFR BLD: 2.1 % (ref 0–8)
ERYTHROCYTE [DISTWIDTH] IN BLOOD BY AUTOMATED COUNT: 17.6 % (ref 11.5–14.5)
EST. GFR  (AFRICAN AMERICAN): >60 ML/MIN/1.73 M^2
EST. GFR  (NON AFRICAN AMERICAN): >60 ML/MIN/1.73 M^2
GLUCOSE SERPL-MCNC: 79 MG/DL (ref 70–110)
HCT VFR BLD AUTO: 27.3 % (ref 37–48.5)
HGB BLD-MCNC: 8.1 G/DL (ref 12–16)
IMM GRANULOCYTES # BLD AUTO: 0.01 K/UL (ref 0–0.04)
IMM GRANULOCYTES NFR BLD AUTO: 0.1 % (ref 0–0.5)
LYMPHOCYTES # BLD AUTO: 1.4 K/UL (ref 1–4.8)
LYMPHOCYTES NFR BLD: 20.4 % (ref 18–48)
MCH RBC QN AUTO: 21.3 PG (ref 27–31)
MCHC RBC AUTO-ENTMCNC: 29.7 G/DL (ref 32–36)
MCV RBC AUTO: 72 FL (ref 82–98)
MONOCYTES # BLD AUTO: 0.7 K/UL (ref 0.3–1)
MONOCYTES NFR BLD: 9.7 % (ref 4–15)
NEUTROPHILS # BLD AUTO: 4.5 K/UL (ref 1.8–7.7)
NEUTROPHILS NFR BLD: 67.1 % (ref 38–73)
NRBC BLD-RTO: 0 /100 WBC
PLATELET # BLD AUTO: 340 K/UL (ref 150–450)
PMV BLD AUTO: 9.6 FL (ref 9.2–12.9)
POTASSIUM SERPL-SCNC: 2.8 MMOL/L (ref 3.5–5.1)
PROT SERPL-MCNC: 6.6 G/DL (ref 6–8.4)
PROT UR-MCNC: 17 MG/DL (ref 0–15)
PROT/CREAT UR: 0.15 MG/G{CREAT} (ref 0–0.2)
RBC # BLD AUTO: 3.8 M/UL (ref 4–5.4)
SODIUM SERPL-SCNC: 139 MMOL/L (ref 136–145)
WBC # BLD AUTO: 6.72 K/UL (ref 3.9–12.7)

## 2021-04-04 PROCEDURE — 85025 COMPLETE CBC W/AUTO DIFF WBC: CPT | Performed by: STUDENT IN AN ORGANIZED HEALTH CARE EDUCATION/TRAINING PROGRAM

## 2021-04-04 PROCEDURE — 99284 PR EMERGENCY DEPT VISIT,LEVEL IV: ICD-10-PCS | Mod: 25,,, | Performed by: OBSTETRICS & GYNECOLOGY

## 2021-04-04 PROCEDURE — 99284 EMERGENCY DEPT VISIT MOD MDM: CPT | Mod: 25,,, | Performed by: OBSTETRICS & GYNECOLOGY

## 2021-04-04 PROCEDURE — 99284 EMERGENCY DEPT VISIT MOD MDM: CPT | Mod: 25

## 2021-04-04 PROCEDURE — 25000003 PHARM REV CODE 250: Performed by: STUDENT IN AN ORGANIZED HEALTH CARE EDUCATION/TRAINING PROGRAM

## 2021-04-04 PROCEDURE — 59025 PR FETAL 2N-STRESS TEST: ICD-10-PCS | Mod: 26,,, | Performed by: OBSTETRICS & GYNECOLOGY

## 2021-04-04 PROCEDURE — 59025 FETAL NON-STRESS TEST: CPT | Mod: 26,,, | Performed by: OBSTETRICS & GYNECOLOGY

## 2021-04-04 PROCEDURE — 59025 FETAL NON-STRESS TEST: CPT

## 2021-04-04 PROCEDURE — 82570 ASSAY OF URINE CREATININE: CPT | Performed by: STUDENT IN AN ORGANIZED HEALTH CARE EDUCATION/TRAINING PROGRAM

## 2021-04-04 PROCEDURE — 80053 COMPREHEN METABOLIC PANEL: CPT | Performed by: STUDENT IN AN ORGANIZED HEALTH CARE EDUCATION/TRAINING PROGRAM

## 2021-04-04 RX ORDER — POTASSIUM CHLORIDE 20 MEQ/1
20 TABLET, EXTENDED RELEASE ORAL ONCE
Status: COMPLETED | OUTPATIENT
Start: 2021-04-04 | End: 2021-04-04

## 2021-04-04 RX ORDER — POTASSIUM CHLORIDE 20 MEQ/1
20 TABLET, EXTENDED RELEASE ORAL
Qty: 3 TABLET | Refills: 0 | Status: SHIPPED | OUTPATIENT
Start: 2021-04-04 | End: 2021-04-05

## 2021-04-04 RX ORDER — BUTALBITAL, ACETAMINOPHEN AND CAFFEINE 50; 325; 40 MG/1; MG/1; MG/1
2 TABLET ORAL ONCE
Status: COMPLETED | OUTPATIENT
Start: 2021-04-04 | End: 2021-04-04

## 2021-04-04 RX ADMIN — POTASSIUM CHLORIDE 20 MEQ: 1500 TABLET, EXTENDED RELEASE ORAL at 10:04

## 2021-04-04 RX ADMIN — BUTALBITAL, ACETAMINOPHEN, AND CAFFEINE 2 TABLET: 50; 325; 40 TABLET ORAL at 08:04

## 2021-04-08 ENCOUNTER — ROUTINE PRENATAL (OUTPATIENT)
Dept: OBSTETRICS AND GYNECOLOGY | Facility: CLINIC | Age: 26
End: 2021-04-08
Payer: MEDICAID

## 2021-04-08 ENCOUNTER — HOSPITAL ENCOUNTER (INPATIENT)
Facility: HOSPITAL | Age: 26
LOS: 3 days | Discharge: HOME | End: 2021-04-11
Attending: OBSTETRICS & GYNECOLOGY | Admitting: OBSTETRICS & GYNECOLOGY
Payer: MEDICAID

## 2021-04-08 VITALS
WEIGHT: 173.31 LBS | SYSTOLIC BLOOD PRESSURE: 162 MMHG | DIASTOLIC BLOOD PRESSURE: 102 MMHG | BODY MASS INDEX: 31.69 KG/M2

## 2021-04-08 DIAGNOSIS — O09.293 HISTORY OF PRE-ECLAMPSIA IN PRIOR PREGNANCY, CURRENTLY PREGNANT IN THIRD TRIMESTER: Primary | ICD-10-CM

## 2021-04-08 DIAGNOSIS — D62 ACUTE BLOOD LOSS ANEMIA: ICD-10-CM

## 2021-04-08 DIAGNOSIS — Z37.9 VACUUM-ASSISTED VAGINAL DELIVERY: Primary | ICD-10-CM

## 2021-04-08 DIAGNOSIS — Z34.90 PREGNANCY WITH ONE FETUS: ICD-10-CM

## 2021-04-08 LAB
ALBUMIN SERPL BCP-MCNC: 2.7 G/DL (ref 3.5–5.2)
ALP SERPL-CCNC: 214 U/L (ref 55–135)
ALT SERPL W/O P-5'-P-CCNC: 10 U/L (ref 10–44)
ANION GAP SERPL CALC-SCNC: 13 MMOL/L (ref 8–16)
AST SERPL-CCNC: 17 U/L (ref 10–40)
BASOPHILS # BLD AUTO: 0.04 K/UL (ref 0–0.2)
BASOPHILS NFR BLD: 0.7 % (ref 0–1.9)
BILIRUB SERPL-MCNC: 0.6 MG/DL (ref 0.1–1)
BUN SERPL-MCNC: 4 MG/DL (ref 6–20)
CALCIUM SERPL-MCNC: 8.2 MG/DL (ref 8.7–10.5)
CHLORIDE SERPL-SCNC: 107 MMOL/L (ref 95–110)
CO2 SERPL-SCNC: 20 MMOL/L (ref 23–29)
CREAT SERPL-MCNC: 0.6 MG/DL (ref 0.5–1.4)
CREAT UR-MCNC: 193.1 MG/DL (ref 15–325)
DIFFERENTIAL METHOD: ABNORMAL
EOSINOPHIL # BLD AUTO: 0.1 K/UL (ref 0–0.5)
EOSINOPHIL NFR BLD: 1.7 % (ref 0–8)
ERYTHROCYTE [DISTWIDTH] IN BLOOD BY AUTOMATED COUNT: 17.6 % (ref 11.5–14.5)
EST. GFR  (AFRICAN AMERICAN): >60 ML/MIN/1.73 M^2
EST. GFR  (NON AFRICAN AMERICAN): >60 ML/MIN/1.73 M^2
GLUCOSE SERPL-MCNC: 66 MG/DL (ref 70–110)
HCT VFR BLD AUTO: 26.2 % (ref 37–48.5)
HGB BLD-MCNC: 7.9 G/DL (ref 12–16)
IMM GRANULOCYTES # BLD AUTO: 0.01 K/UL (ref 0–0.04)
IMM GRANULOCYTES NFR BLD AUTO: 0.2 % (ref 0–0.5)
LYMPHOCYTES # BLD AUTO: 1.1 K/UL (ref 1–4.8)
LYMPHOCYTES NFR BLD: 19.2 % (ref 18–48)
MCH RBC QN AUTO: 21.4 PG (ref 27–31)
MCHC RBC AUTO-ENTMCNC: 30.2 G/DL (ref 32–36)
MCV RBC AUTO: 71 FL (ref 82–98)
MONOCYTES # BLD AUTO: 0.5 K/UL (ref 0.3–1)
MONOCYTES NFR BLD: 8.5 % (ref 4–15)
NEUTROPHILS # BLD AUTO: 4.1 K/UL (ref 1.8–7.7)
NEUTROPHILS NFR BLD: 69.7 % (ref 38–73)
NRBC BLD-RTO: 0 /100 WBC
PLATELET # BLD AUTO: 295 K/UL (ref 150–450)
PMV BLD AUTO: 10.3 FL (ref 9.2–12.9)
POTASSIUM SERPL-SCNC: 2.7 MMOL/L (ref 3.5–5.1)
PROT SERPL-MCNC: 6.3 G/DL (ref 6–8.4)
PROT UR-MCNC: 32 MG/DL
PROT/CREAT UR: 0.17 MG/G{CREAT} (ref 0–0.2)
RBC # BLD AUTO: 3.69 M/UL (ref 4–5.4)
SARS-COV-2 RDRP RESP QL NAA+PROBE: NEGATIVE
SODIUM SERPL-SCNC: 140 MMOL/L (ref 136–145)
WBC # BLD AUTO: 5.89 K/UL (ref 3.9–12.7)

## 2021-04-08 PROCEDURE — 99999 PR PBB SHADOW E&M-EST. PATIENT-LVL III: CPT | Mod: PBBFAC,,, | Performed by: OBSTETRICS & GYNECOLOGY

## 2021-04-08 PROCEDURE — 25000003 PHARM REV CODE 250: Performed by: OBSTETRICS & GYNECOLOGY

## 2021-04-08 PROCEDURE — 99499 NO LOS: ICD-10-PCS | Mod: S$PBB,,, | Performed by: OBSTETRICS & GYNECOLOGY

## 2021-04-08 PROCEDURE — 85025 COMPLETE CBC W/AUTO DIFF WBC: CPT | Performed by: OBSTETRICS & GYNECOLOGY

## 2021-04-08 PROCEDURE — 11000001 HC ACUTE MED/SURG PRIVATE ROOM

## 2021-04-08 PROCEDURE — 36415 COLL VENOUS BLD VENIPUNCTURE: CPT | Performed by: OBSTETRICS & GYNECOLOGY

## 2021-04-08 PROCEDURE — 99999 PR PBB SHADOW E&M-EST. PATIENT-LVL III: ICD-10-PCS | Mod: PBBFAC,,, | Performed by: OBSTETRICS & GYNECOLOGY

## 2021-04-08 PROCEDURE — 63600175 PHARM REV CODE 636 W HCPCS: Performed by: OBSTETRICS & GYNECOLOGY

## 2021-04-08 PROCEDURE — 84156 ASSAY OF PROTEIN URINE: CPT | Performed by: OBSTETRICS & GYNECOLOGY

## 2021-04-08 PROCEDURE — 99499 UNLISTED E&M SERVICE: CPT | Mod: S$PBB,,, | Performed by: OBSTETRICS & GYNECOLOGY

## 2021-04-08 PROCEDURE — 72100002 HC LABOR CARE, 1ST 8 HOURS

## 2021-04-08 PROCEDURE — 80053 COMPREHEN METABOLIC PANEL: CPT | Performed by: OBSTETRICS & GYNECOLOGY

## 2021-04-08 PROCEDURE — 99213 OFFICE O/P EST LOW 20 MIN: CPT | Mod: PBBFAC,TH | Performed by: OBSTETRICS & GYNECOLOGY

## 2021-04-08 PROCEDURE — U0002 COVID-19 LAB TEST NON-CDC: HCPCS | Performed by: OBSTETRICS & GYNECOLOGY

## 2021-04-08 RX ORDER — OXYTOCIN/RINGER'S LACTATE 30/500 ML
334 PLASTIC BAG, INJECTION (ML) INTRAVENOUS ONCE
Status: DISCONTINUED | OUTPATIENT
Start: 2021-04-09 | End: 2021-04-09

## 2021-04-08 RX ORDER — HYDRALAZINE HYDROCHLORIDE 20 MG/ML
10 INJECTION INTRAMUSCULAR; INTRAVENOUS ONCE AS NEEDED
Status: DISCONTINUED | OUTPATIENT
Start: 2021-04-08 | End: 2021-04-11 | Stop reason: HOSPADM

## 2021-04-08 RX ORDER — LABETALOL HYDROCHLORIDE 5 MG/ML
40 INJECTION, SOLUTION INTRAVENOUS ONCE AS NEEDED
Status: DISCONTINUED | OUTPATIENT
Start: 2021-04-08 | End: 2021-04-11 | Stop reason: HOSPADM

## 2021-04-08 RX ORDER — ONDANSETRON 8 MG/1
8 TABLET, ORALLY DISINTEGRATING ORAL EVERY 8 HOURS PRN
Status: DISCONTINUED | OUTPATIENT
Start: 2021-04-08 | End: 2021-04-09

## 2021-04-08 RX ORDER — LABETALOL HYDROCHLORIDE 5 MG/ML
20 INJECTION, SOLUTION INTRAVENOUS ONCE AS NEEDED
Status: COMPLETED | OUTPATIENT
Start: 2021-04-08 | End: 2021-04-09

## 2021-04-08 RX ORDER — POTASSIUM CHLORIDE 20 MEQ/1
40 TABLET, EXTENDED RELEASE ORAL ONCE
Status: COMPLETED | OUTPATIENT
Start: 2021-04-08 | End: 2021-04-08

## 2021-04-08 RX ORDER — BUTORPHANOL TARTRATE 1 MG/ML
1 INJECTION INTRAMUSCULAR; INTRAVENOUS
Status: DISCONTINUED | OUTPATIENT
Start: 2021-04-08 | End: 2021-04-11 | Stop reason: HOSPADM

## 2021-04-08 RX ORDER — CALCIUM CARBONATE 200(500)MG
500 TABLET,CHEWABLE ORAL 3 TIMES DAILY PRN
Status: DISCONTINUED | OUTPATIENT
Start: 2021-04-08 | End: 2021-04-11 | Stop reason: HOSPADM

## 2021-04-08 RX ORDER — ACETAMINOPHEN 500 MG
1000 TABLET ORAL EVERY 6 HOURS PRN
Status: DISCONTINUED | OUTPATIENT
Start: 2021-04-08 | End: 2021-04-09

## 2021-04-08 RX ORDER — SODIUM CHLORIDE, SODIUM LACTATE, POTASSIUM CHLORIDE, CALCIUM CHLORIDE 600; 310; 30; 20 MG/100ML; MG/100ML; MG/100ML; MG/100ML
INJECTION, SOLUTION INTRAVENOUS CONTINUOUS
Status: DISCONTINUED | OUTPATIENT
Start: 2021-04-08 | End: 2021-04-09

## 2021-04-08 RX ORDER — SIMETHICONE 80 MG
1 TABLET,CHEWABLE ORAL 4 TIMES DAILY PRN
Status: DISCONTINUED | OUTPATIENT
Start: 2021-04-08 | End: 2021-04-09

## 2021-04-08 RX ORDER — PROCHLORPERAZINE EDISYLATE 5 MG/ML
5 INJECTION INTRAMUSCULAR; INTRAVENOUS EVERY 6 HOURS PRN
Status: DISCONTINUED | OUTPATIENT
Start: 2021-04-08 | End: 2021-04-09

## 2021-04-08 RX ORDER — OXYTOCIN/RINGER'S LACTATE 30/500 ML
95 PLASTIC BAG, INJECTION (ML) INTRAVENOUS ONCE
Status: DISCONTINUED | OUTPATIENT
Start: 2021-04-09 | End: 2021-04-09

## 2021-04-08 RX ORDER — LABETALOL HYDROCHLORIDE 5 MG/ML
80 INJECTION, SOLUTION INTRAVENOUS ONCE AS NEEDED
Status: DISCONTINUED | OUTPATIENT
Start: 2021-04-08 | End: 2021-04-11 | Stop reason: HOSPADM

## 2021-04-08 RX ADMIN — SODIUM CHLORIDE, SODIUM LACTATE, POTASSIUM CHLORIDE, AND CALCIUM CHLORIDE: .6; .31; .03; .02 INJECTION, SOLUTION INTRAVENOUS at 09:04

## 2021-04-08 RX ADMIN — POTASSIUM CHLORIDE 40 MEQ: 1500 TABLET, EXTENDED RELEASE ORAL at 07:04

## 2021-04-08 RX ADMIN — DINOPROSTONE 10 MG: 10 INSERT VAGINAL at 06:04

## 2021-04-08 RX ADMIN — ACETAMINOPHEN 1000 MG: 500 TABLET ORAL at 07:04

## 2021-04-09 ENCOUNTER — ANESTHESIA EVENT (OUTPATIENT)
Dept: OBSTETRICS AND GYNECOLOGY | Facility: HOSPITAL | Age: 26
End: 2021-04-09
Payer: MEDICAID

## 2021-04-09 ENCOUNTER — ANESTHESIA (OUTPATIENT)
Dept: OBSTETRICS AND GYNECOLOGY | Facility: HOSPITAL | Age: 26
End: 2021-04-09
Payer: MEDICAID

## 2021-04-09 PROBLEM — O13.3 GESTATIONAL HYPERTENSION, THIRD TRIMESTER: Status: RESOLVED | Noted: 2021-04-09 | Resolved: 2021-04-09

## 2021-04-09 PROBLEM — D62 ACUTE BLOOD LOSS ANEMIA: Status: ACTIVE | Noted: 2021-04-09

## 2021-04-09 PROBLEM — Z37.9 VACUUM-ASSISTED VAGINAL DELIVERY: Status: ACTIVE | Noted: 2021-04-09

## 2021-04-09 PROBLEM — O13.3 GESTATIONAL HYPERTENSION, THIRD TRIMESTER: Status: ACTIVE | Noted: 2021-04-09

## 2021-04-09 PROBLEM — Z34.90 PREGNANCY WITH ONE FETUS: Status: RESOLVED | Noted: 2021-04-08 | Resolved: 2021-04-09

## 2021-04-09 LAB
ALBUMIN SERPL BCP-MCNC: 2.5 G/DL (ref 3.5–5.2)
ALP SERPL-CCNC: 203 U/L (ref 55–135)
ALT SERPL W/O P-5'-P-CCNC: 11 U/L (ref 10–44)
ANION GAP SERPL CALC-SCNC: 10 MMOL/L (ref 8–16)
AST SERPL-CCNC: 18 U/L (ref 10–40)
BILIRUB SERPL-MCNC: 0.6 MG/DL (ref 0.1–1)
BUN SERPL-MCNC: 4 MG/DL (ref 6–20)
CALCIUM SERPL-MCNC: 8.1 MG/DL (ref 8.7–10.5)
CHLORIDE SERPL-SCNC: 108 MMOL/L (ref 95–110)
CO2 SERPL-SCNC: 21 MMOL/L (ref 23–29)
CREAT SERPL-MCNC: 0.6 MG/DL (ref 0.5–1.4)
EST. GFR  (AFRICAN AMERICAN): >60 ML/MIN/1.73 M^2
EST. GFR  (NON AFRICAN AMERICAN): >60 ML/MIN/1.73 M^2
GLUCOSE SERPL-MCNC: 68 MG/DL (ref 70–110)
POTASSIUM SERPL-SCNC: 3 MMOL/L (ref 3.5–5.1)
PROT SERPL-MCNC: 6 G/DL (ref 6–8.4)
SODIUM SERPL-SCNC: 139 MMOL/L (ref 136–145)

## 2021-04-09 PROCEDURE — 25000003 PHARM REV CODE 250: Performed by: OBSTETRICS & GYNECOLOGY

## 2021-04-09 PROCEDURE — 62326 NJX INTERLAMINAR LMBR/SAC: CPT | Performed by: ANESTHESIOLOGY

## 2021-04-09 PROCEDURE — 25000003 PHARM REV CODE 250: Performed by: NURSE ANESTHETIST, CERTIFIED REGISTERED

## 2021-04-09 PROCEDURE — 25000003 PHARM REV CODE 250: Performed by: ANESTHESIOLOGY

## 2021-04-09 PROCEDURE — 63600175 PHARM REV CODE 636 W HCPCS: Performed by: OBSTETRICS & GYNECOLOGY

## 2021-04-09 PROCEDURE — 59409 PR OBSTETRICAL CARE,VAG DELIV ONLY: ICD-10-PCS | Mod: AT,,, | Performed by: OBSTETRICS & GYNECOLOGY

## 2021-04-09 PROCEDURE — 59025 FETAL NON-STRESS TEST: CPT | Mod: 26,,, | Performed by: OBSTETRICS & GYNECOLOGY

## 2021-04-09 PROCEDURE — 72100003 HC LABOR CARE, EA. ADDL. 8 HRS

## 2021-04-09 PROCEDURE — 11000001 HC ACUTE MED/SURG PRIVATE ROOM

## 2021-04-09 PROCEDURE — 59409 OBSTETRICAL CARE: CPT | Mod: AT,,, | Performed by: OBSTETRICS & GYNECOLOGY

## 2021-04-09 PROCEDURE — 59409 PRA ETRICAL CARE,VAG DELIV ONLY: ICD-10-PCS | Mod: AA,,, | Performed by: ANESTHESIOLOGY

## 2021-04-09 PROCEDURE — 59025 PR FETAL 2N-STRESS TEST: ICD-10-PCS | Mod: 26,,, | Performed by: OBSTETRICS & GYNECOLOGY

## 2021-04-09 PROCEDURE — 51702 INSERT TEMP BLADDER CATH: CPT

## 2021-04-09 PROCEDURE — 59409 OBSTETRICAL CARE: CPT | Mod: AA,,, | Performed by: ANESTHESIOLOGY

## 2021-04-09 PROCEDURE — 27200710 HC EPIDURAL INFUSION PUMP SET: Performed by: ANESTHESIOLOGY

## 2021-04-09 PROCEDURE — 72200006 HC VAGINAL DELIVERY LEVEL III

## 2021-04-09 PROCEDURE — 80053 COMPREHEN METABOLIC PANEL: CPT | Performed by: OBSTETRICS & GYNECOLOGY

## 2021-04-09 PROCEDURE — 27800516 HC TRAY, EPIDURAL COMBO: Performed by: ANESTHESIOLOGY

## 2021-04-09 PROCEDURE — 27201127 HC VACUUM EXTRACTOR

## 2021-04-09 PROCEDURE — C1751 CATH, INF, PER/CENT/MIDLINE: HCPCS | Performed by: ANESTHESIOLOGY

## 2021-04-09 PROCEDURE — 36415 COLL VENOUS BLD VENIPUNCTURE: CPT | Performed by: OBSTETRICS & GYNECOLOGY

## 2021-04-09 RX ORDER — MISOPROSTOL 200 UG/1
TABLET ORAL
Status: DISPENSED
Start: 2021-04-09 | End: 2021-04-10

## 2021-04-09 RX ORDER — DIPHENHYDRAMINE HYDROCHLORIDE 50 MG/ML
25 INJECTION INTRAMUSCULAR; INTRAVENOUS EVERY 4 HOURS PRN
Status: DISCONTINUED | OUTPATIENT
Start: 2021-04-09 | End: 2021-04-11 | Stop reason: HOSPADM

## 2021-04-09 RX ORDER — MISOPROSTOL 200 UG/1
1000 TABLET ORAL EVERY 6 HOURS
Status: DISCONTINUED | OUTPATIENT
Start: 2021-04-09 | End: 2021-04-09

## 2021-04-09 RX ORDER — SODIUM CHLORIDE, SODIUM LACTATE, POTASSIUM CHLORIDE, CALCIUM CHLORIDE 600; 310; 30; 20 MG/100ML; MG/100ML; MG/100ML; MG/100ML
INJECTION, SOLUTION INTRAVENOUS CONTINUOUS
Status: DISCONTINUED | OUTPATIENT
Start: 2021-04-09 | End: 2021-04-11 | Stop reason: HOSPADM

## 2021-04-09 RX ORDER — OXYTOCIN/RINGER'S LACTATE 30/500 ML
0-30 PLASTIC BAG, INJECTION (ML) INTRAVENOUS CONTINUOUS
Status: DISCONTINUED | OUTPATIENT
Start: 2021-04-09 | End: 2021-04-09

## 2021-04-09 RX ORDER — ACETAMINOPHEN 325 MG/1
650 TABLET ORAL EVERY 6 HOURS PRN
Status: DISCONTINUED | OUTPATIENT
Start: 2021-04-09 | End: 2021-04-11 | Stop reason: HOSPADM

## 2021-04-09 RX ORDER — DOCUSATE SODIUM 100 MG/1
200 CAPSULE, LIQUID FILLED ORAL 2 TIMES DAILY PRN
Status: DISCONTINUED | OUTPATIENT
Start: 2021-04-09 | End: 2021-04-11 | Stop reason: HOSPADM

## 2021-04-09 RX ORDER — IBUPROFEN 600 MG/1
600 TABLET ORAL EVERY 6 HOURS
Status: DISCONTINUED | OUTPATIENT
Start: 2021-04-09 | End: 2021-04-11 | Stop reason: HOSPADM

## 2021-04-09 RX ORDER — FENTANYL/BUPIVACAINE/NS/PF 2MCG/ML-.1
PLASTIC BAG, INJECTION (ML) INJECTION
Status: DISCONTINUED | OUTPATIENT
Start: 2021-04-09 | End: 2021-04-09

## 2021-04-09 RX ORDER — MAGNESIUM SULFATE HEPTAHYDRATE 40 MG/ML
2 INJECTION, SOLUTION INTRAVENOUS
Status: DISCONTINUED | OUTPATIENT
Start: 2021-04-09 | End: 2021-04-11 | Stop reason: HOSPADM

## 2021-04-09 RX ORDER — OXYTOCIN/RINGER'S LACTATE 30/500 ML
95 PLASTIC BAG, INJECTION (ML) INTRAVENOUS ONCE
Status: DISCONTINUED | OUTPATIENT
Start: 2021-04-09 | End: 2021-04-11 | Stop reason: HOSPADM

## 2021-04-09 RX ORDER — ONDANSETRON 8 MG/1
8 TABLET, ORALLY DISINTEGRATING ORAL EVERY 8 HOURS PRN
Status: DISCONTINUED | OUTPATIENT
Start: 2021-04-09 | End: 2021-04-11 | Stop reason: HOSPADM

## 2021-04-09 RX ORDER — SODIUM CHLORIDE, SODIUM LACTATE, POTASSIUM CHLORIDE, CALCIUM CHLORIDE 600; 310; 30; 20 MG/100ML; MG/100ML; MG/100ML; MG/100ML
INJECTION, SOLUTION INTRAVENOUS CONTINUOUS
Status: DISCONTINUED | OUTPATIENT
Start: 2021-04-09 | End: 2021-04-09

## 2021-04-09 RX ORDER — PRENATAL WITH FERROUS FUM AND FOLIC ACID 3080; 920; 120; 400; 22; 1.84; 3; 20; 10; 1; 12; 200; 27; 25; 2 [IU]/1; [IU]/1; MG/1; [IU]/1; MG/1; MG/1; MG/1; MG/1; MG/1; MG/1; UG/1; MG/1; MG/1; MG/1; MG/1
1 TABLET ORAL DAILY
Status: DISCONTINUED | OUTPATIENT
Start: 2021-04-09 | End: 2021-04-11 | Stop reason: HOSPADM

## 2021-04-09 RX ORDER — OXYCODONE HYDROCHLORIDE 5 MG/1
10 TABLET ORAL EVERY 6 HOURS PRN
Status: DISCONTINUED | OUTPATIENT
Start: 2021-04-09 | End: 2021-04-11 | Stop reason: HOSPADM

## 2021-04-09 RX ORDER — PROCHLORPERAZINE EDISYLATE 5 MG/ML
5 INJECTION INTRAMUSCULAR; INTRAVENOUS EVERY 6 HOURS PRN
Status: DISCONTINUED | OUTPATIENT
Start: 2021-04-09 | End: 2021-04-11 | Stop reason: HOSPADM

## 2021-04-09 RX ORDER — MAGNESIUM SULFATE HEPTAHYDRATE 40 MG/ML
2 INJECTION, SOLUTION INTRAVENOUS CONTINUOUS
Status: DISCONTINUED | OUTPATIENT
Start: 2021-04-09 | End: 2021-04-11 | Stop reason: HOSPADM

## 2021-04-09 RX ORDER — LIDOCAINE HCL/EPINEPHRINE/PF 2%-1:200K
VIAL (ML) INJECTION
Status: DISCONTINUED | OUTPATIENT
Start: 2021-04-09 | End: 2021-04-09

## 2021-04-09 RX ORDER — SIMETHICONE 80 MG
1 TABLET,CHEWABLE ORAL EVERY 6 HOURS PRN
Status: DISCONTINUED | OUTPATIENT
Start: 2021-04-09 | End: 2021-04-11 | Stop reason: HOSPADM

## 2021-04-09 RX ORDER — MAGNESIUM SULFATE HEPTAHYDRATE 40 MG/ML
4 INJECTION, SOLUTION INTRAVENOUS ONCE
Status: COMPLETED | OUTPATIENT
Start: 2021-04-09 | End: 2021-04-09

## 2021-04-09 RX ORDER — CALCIUM GLUCONATE 98 MG/ML
1 INJECTION, SOLUTION INTRAVENOUS
Status: DISCONTINUED | OUTPATIENT
Start: 2021-04-09 | End: 2021-04-11 | Stop reason: HOSPADM

## 2021-04-09 RX ORDER — HYDROCORTISONE 25 MG/G
CREAM TOPICAL 3 TIMES DAILY PRN
Status: DISCONTINUED | OUTPATIENT
Start: 2021-04-09 | End: 2021-04-11 | Stop reason: HOSPADM

## 2021-04-09 RX ORDER — DIPHENHYDRAMINE HCL 25 MG
25 CAPSULE ORAL EVERY 4 HOURS PRN
Status: DISCONTINUED | OUTPATIENT
Start: 2021-04-09 | End: 2021-04-11 | Stop reason: HOSPADM

## 2021-04-09 RX ADMIN — SODIUM CHLORIDE, SODIUM LACTATE, POTASSIUM CHLORIDE, AND CALCIUM CHLORIDE: .6; .31; .03; .02 INJECTION, SOLUTION INTRAVENOUS at 05:04

## 2021-04-09 RX ADMIN — OXYCODONE 10 MG: 5 TABLET ORAL at 03:04

## 2021-04-09 RX ADMIN — LABETALOL HYDROCHLORIDE 20 MG: 5 INJECTION INTRAVENOUS at 12:04

## 2021-04-09 RX ADMIN — BUTORPHANOL TARTRATE 1 MG: 1 INJECTION, SOLUTION INTRAMUSCULAR; INTRAVENOUS at 02:04

## 2021-04-09 RX ADMIN — PENICILLIN G POTASSIUM 2.5 MILLION UNITS: 5000000 INJECTION, POWDER, FOR SOLUTION INTRAMUSCULAR; INTRAVENOUS at 01:04

## 2021-04-09 RX ADMIN — OXYCODONE 10 MG: 5 TABLET ORAL at 10:04

## 2021-04-09 RX ADMIN — MISOPROSTOL 1000 MCG: 200 TABLET ORAL at 02:04

## 2021-04-09 RX ADMIN — IBUPROFEN 600 MG: 600 TABLET ORAL at 05:04

## 2021-04-09 RX ADMIN — IBUPROFEN 600 MG: 600 TABLET ORAL at 10:04

## 2021-04-09 RX ADMIN — PROCHLORPERAZINE EDISYLATE 5 MG: 5 INJECTION INTRAMUSCULAR; INTRAVENOUS at 02:04

## 2021-04-09 RX ADMIN — Medication 2 MILLI-UNITS/MIN: at 05:04

## 2021-04-09 RX ADMIN — PENICILLIN G POTASSIUM 2.5 MILLION UNITS: 5000000 INJECTION, POWDER, FOR SOLUTION INTRAMUSCULAR; INTRAVENOUS at 03:04

## 2021-04-09 RX ADMIN — ACETAMINOPHEN 1000 MG: 500 TABLET ORAL at 07:04

## 2021-04-09 RX ADMIN — PENICILLIN G POTASSIUM 5 MILLION UNITS: 5000000 INJECTION, POWDER, FOR SOLUTION INTRAMUSCULAR; INTRAVENOUS at 12:04

## 2021-04-09 RX ADMIN — LIDOCAINE HYDROCHLORIDE,EPINEPHRINE BITARTRATE 3 ML: 20; .005 INJECTION, SOLUTION EPIDURAL; INFILTRATION; INTRACAUDAL; PERINEURAL at 12:04

## 2021-04-09 RX ADMIN — Medication 4 ML: at 07:04

## 2021-04-09 RX ADMIN — MAGNESIUM SULFATE HEPTAHYDRATE 4 G: 40 INJECTION, SOLUTION INTRAVENOUS at 12:04

## 2021-04-09 RX ADMIN — Medication 10 ML/HR: at 07:04

## 2021-04-10 LAB
BASOPHILS # BLD AUTO: 0.06 K/UL (ref 0–0.2)
BASOPHILS NFR BLD: 0.5 % (ref 0–1.9)
DIFFERENTIAL METHOD: ABNORMAL
EOSINOPHIL # BLD AUTO: 0.2 K/UL (ref 0–0.5)
EOSINOPHIL NFR BLD: 1.5 % (ref 0–8)
ERYTHROCYTE [DISTWIDTH] IN BLOOD BY AUTOMATED COUNT: 17.7 % (ref 11.5–14.5)
HCT VFR BLD AUTO: 21.5 % (ref 37–48.5)
HGB BLD-MCNC: 6.3 G/DL (ref 12–16)
IMM GRANULOCYTES # BLD AUTO: 0.05 K/UL (ref 0–0.04)
IMM GRANULOCYTES NFR BLD AUTO: 0.4 % (ref 0–0.5)
LYMPHOCYTES # BLD AUTO: 1.8 K/UL (ref 1–4.8)
LYMPHOCYTES NFR BLD: 14.9 % (ref 18–48)
MCH RBC QN AUTO: 21.4 PG (ref 27–31)
MCHC RBC AUTO-ENTMCNC: 29.3 G/DL (ref 32–36)
MCV RBC AUTO: 73 FL (ref 82–98)
MONOCYTES # BLD AUTO: 1.1 K/UL (ref 0.3–1)
MONOCYTES NFR BLD: 8.5 % (ref 4–15)
NEUTROPHILS # BLD AUTO: 9.1 K/UL (ref 1.8–7.7)
NEUTROPHILS NFR BLD: 74.2 % (ref 38–73)
NRBC BLD-RTO: 0 /100 WBC
PLATELET # BLD AUTO: 144 K/UL (ref 150–450)
PMV BLD AUTO: 9.9 FL (ref 9.2–12.9)
RBC # BLD AUTO: 2.94 M/UL (ref 4–5.4)
WBC # BLD AUTO: 12.3 K/UL (ref 3.9–12.7)

## 2021-04-10 PROCEDURE — 36415 COLL VENOUS BLD VENIPUNCTURE: CPT | Performed by: OBSTETRICS & GYNECOLOGY

## 2021-04-10 PROCEDURE — 85025 COMPLETE CBC W/AUTO DIFF WBC: CPT | Performed by: OBSTETRICS & GYNECOLOGY

## 2021-04-10 PROCEDURE — 25000003 PHARM REV CODE 250: Performed by: OBSTETRICS & GYNECOLOGY

## 2021-04-10 PROCEDURE — 63600175 PHARM REV CODE 636 W HCPCS: Performed by: OBSTETRICS & GYNECOLOGY

## 2021-04-10 PROCEDURE — 11000001 HC ACUTE MED/SURG PRIVATE ROOM

## 2021-04-10 RX ADMIN — IBUPROFEN 600 MG: 600 TABLET ORAL at 06:04

## 2021-04-10 RX ADMIN — OXYCODONE 10 MG: 5 TABLET ORAL at 04:04

## 2021-04-10 RX ADMIN — IBUPROFEN 600 MG: 600 TABLET ORAL at 11:04

## 2021-04-10 RX ADMIN — OXYCODONE 10 MG: 5 TABLET ORAL at 11:04

## 2021-04-10 RX ADMIN — PRENATAL VIT W/ FE FUMARATE-FA TAB 27-0.8 MG 1 TABLET: 27-0.8 TAB at 09:04

## 2021-04-10 RX ADMIN — SODIUM CHLORIDE, SODIUM LACTATE, POTASSIUM CHLORIDE, AND CALCIUM CHLORIDE: .6; .31; .03; .02 INJECTION, SOLUTION INTRAVENOUS at 05:04

## 2021-04-10 RX ADMIN — IBUPROFEN 600 MG: 600 TABLET ORAL at 05:04

## 2021-04-10 RX ADMIN — MAGNESIUM SULFATE HEPTAHYDRATE 2 G/HR: 40 INJECTION, SOLUTION INTRAVENOUS at 03:04

## 2021-04-10 RX ADMIN — SODIUM CHLORIDE, SODIUM LACTATE, POTASSIUM CHLORIDE, AND CALCIUM CHLORIDE: .6; .31; .03; .02 INJECTION, SOLUTION INTRAVENOUS at 08:04

## 2021-04-10 RX ADMIN — OXYCODONE 10 MG: 5 TABLET ORAL at 08:04

## 2021-04-11 VITALS
RESPIRATION RATE: 18 BRPM | WEIGHT: 173.31 LBS | DIASTOLIC BLOOD PRESSURE: 62 MMHG | SYSTOLIC BLOOD PRESSURE: 138 MMHG | HEART RATE: 78 BPM | OXYGEN SATURATION: 96 % | BODY MASS INDEX: 31.89 KG/M2 | TEMPERATURE: 98 F | HEIGHT: 62 IN

## 2021-04-11 PROCEDURE — 99238 HOSP IP/OBS DSCHRG MGMT 30/<: CPT | Mod: ,,, | Performed by: OBSTETRICS & GYNECOLOGY

## 2021-04-11 PROCEDURE — 25000003 PHARM REV CODE 250: Performed by: OBSTETRICS & GYNECOLOGY

## 2021-04-11 PROCEDURE — 99238 PR HOSPITAL DISCHARGE DAY,<30 MIN: ICD-10-PCS | Mod: ,,, | Performed by: OBSTETRICS & GYNECOLOGY

## 2021-04-11 RX ORDER — FERROUS SULFATE 325(65) MG
325 TABLET, DELAYED RELEASE (ENTERIC COATED) ORAL 2 TIMES DAILY
Qty: 60 TABLET | Refills: 1 | OUTPATIENT
Start: 2021-04-11 | End: 2021-10-31

## 2021-04-11 RX ORDER — IBUPROFEN 800 MG/1
800 TABLET ORAL EVERY 6 HOURS PRN
Qty: 30 TABLET | Refills: 2 | OUTPATIENT
Start: 2021-04-11 | End: 2021-07-19

## 2021-04-11 RX ORDER — DOCUSATE SODIUM 100 MG/1
100 CAPSULE, LIQUID FILLED ORAL 2 TIMES DAILY
Qty: 60 CAPSULE | Refills: 1 | OUTPATIENT
Start: 2021-04-11 | End: 2021-10-31

## 2021-04-11 RX ORDER — OXYCODONE AND ACETAMINOPHEN 5; 325 MG/1; MG/1
1 TABLET ORAL EVERY 4 HOURS PRN
Qty: 6 TABLET | Refills: 0 | OUTPATIENT
Start: 2021-04-11 | End: 2021-10-31

## 2021-04-11 RX ADMIN — PRENATAL VIT W/ FE FUMARATE-FA TAB 27-0.8 MG 1 TABLET: 27-0.8 TAB at 08:04

## 2021-04-11 RX ADMIN — IBUPROFEN 600 MG: 600 TABLET ORAL at 07:04

## 2021-04-11 RX ADMIN — OXYCODONE 10 MG: 5 TABLET ORAL at 09:04

## 2021-04-11 RX ADMIN — IBUPROFEN 600 MG: 600 TABLET ORAL at 01:04

## 2021-04-29 ENCOUNTER — PATIENT MESSAGE (OUTPATIENT)
Dept: OBSTETRICS AND GYNECOLOGY | Facility: CLINIC | Age: 26
End: 2021-04-29

## 2021-05-03 ENCOUNTER — POSTPARTUM VISIT (OUTPATIENT)
Dept: OBSTETRICS AND GYNECOLOGY | Facility: CLINIC | Age: 26
End: 2021-05-03
Payer: MEDICAID

## 2021-05-03 VITALS
SYSTOLIC BLOOD PRESSURE: 140 MMHG | WEIGHT: 150.56 LBS | BODY MASS INDEX: 27.54 KG/M2 | DIASTOLIC BLOOD PRESSURE: 102 MMHG

## 2021-05-03 DIAGNOSIS — N76.0 BV (BACTERIAL VAGINOSIS): ICD-10-CM

## 2021-05-03 DIAGNOSIS — B96.89 BV (BACTERIAL VAGINOSIS): ICD-10-CM

## 2021-05-03 PROCEDURE — 59430 PR CARE AFTER DELIVERY ONLY: ICD-10-PCS | Mod: ,,, | Performed by: OBSTETRICS & GYNECOLOGY

## 2021-05-03 PROCEDURE — 99212 OFFICE O/P EST SF 10 MIN: CPT | Mod: PBBFAC | Performed by: OBSTETRICS & GYNECOLOGY

## 2021-05-03 PROCEDURE — 87481 CANDIDA DNA AMP PROBE: CPT | Mod: 59 | Performed by: OBSTETRICS & GYNECOLOGY

## 2021-05-03 PROCEDURE — 87661 TRICHOMONAS VAGINALIS AMPLIF: CPT | Performed by: OBSTETRICS & GYNECOLOGY

## 2021-05-03 PROCEDURE — 99999 PR PBB SHADOW E&M-EST. PATIENT-LVL II: ICD-10-PCS | Mod: PBBFAC,,, | Performed by: OBSTETRICS & GYNECOLOGY

## 2021-05-03 PROCEDURE — 99999 PR PBB SHADOW E&M-EST. PATIENT-LVL II: CPT | Mod: PBBFAC,,, | Performed by: OBSTETRICS & GYNECOLOGY

## 2021-05-03 RX ORDER — METRONIDAZOLE 500 MG/1
500 TABLET ORAL 2 TIMES DAILY
Qty: 14 TABLET | Refills: 0 | Status: SHIPPED | OUTPATIENT
Start: 2021-05-03 | End: 2021-05-10

## 2021-05-05 LAB
BACTERIAL VAGINOSIS DNA: NEGATIVE
CANDIDA GLABRATA DNA: NEGATIVE
CANDIDA KRUSEI DNA: NEGATIVE
CANDIDA RRNA VAG QL PROBE: NEGATIVE
T VAGINALIS RRNA GENITAL QL PROBE: NEGATIVE

## 2021-05-12 PROBLEM — Z37.9 VACUUM-ASSISTED VAGINAL DELIVERY: Status: RESOLVED | Noted: 2021-04-09 | Resolved: 2021-05-12

## 2021-05-17 ENCOUNTER — PATIENT MESSAGE (OUTPATIENT)
Dept: OBSTETRICS AND GYNECOLOGY | Facility: CLINIC | Age: 26
End: 2021-05-17

## 2021-05-26 ENCOUNTER — TELEPHONE (OUTPATIENT)
Dept: OBSTETRICS AND GYNECOLOGY | Facility: CLINIC | Age: 26
End: 2021-05-26

## 2021-06-07 ENCOUNTER — OFFICE VISIT (OUTPATIENT)
Dept: OBSTETRICS AND GYNECOLOGY | Facility: CLINIC | Age: 26
End: 2021-06-07
Payer: MEDICAID

## 2021-06-07 VITALS
BODY MASS INDEX: 27.46 KG/M2 | WEIGHT: 150.13 LBS | SYSTOLIC BLOOD PRESSURE: 160 MMHG | DIASTOLIC BLOOD PRESSURE: 100 MMHG

## 2021-06-07 DIAGNOSIS — R03.0 ELEVATED BLOOD-PRESSURE READING WITHOUT DIAGNOSIS OF HYPERTENSION: ICD-10-CM

## 2021-06-07 DIAGNOSIS — R87.620 ASCUS WITH POSITIVE HIGH RISK HUMAN PAPILLOMAVIRUS OF VAGINA: Primary | ICD-10-CM

## 2021-06-07 DIAGNOSIS — R87.811 ASCUS WITH POSITIVE HIGH RISK HUMAN PAPILLOMAVIRUS OF VAGINA: Primary | ICD-10-CM

## 2021-06-07 PROCEDURE — 87624 HPV HI-RISK TYP POOLED RSLT: CPT | Performed by: OBSTETRICS & GYNECOLOGY

## 2021-06-07 PROCEDURE — 99999 PR PBB SHADOW E&M-EST. PATIENT-LVL II: ICD-10-PCS | Mod: PBBFAC,,, | Performed by: OBSTETRICS & GYNECOLOGY

## 2021-06-07 PROCEDURE — 99212 OFFICE O/P EST SF 10 MIN: CPT | Mod: PBBFAC | Performed by: OBSTETRICS & GYNECOLOGY

## 2021-06-07 PROCEDURE — 88141 PR  CYTOPATH CERV/VAG INTERPRET: ICD-10-PCS | Mod: ,,, | Performed by: PATHOLOGY

## 2021-06-07 PROCEDURE — 88175 CYTOPATH C/V AUTO FLUID REDO: CPT | Performed by: PATHOLOGY

## 2021-06-07 PROCEDURE — 99213 PR OFFICE/OUTPT VISIT, EST, LEVL III, 20-29 MIN: ICD-10-PCS | Mod: S$PBB,,, | Performed by: OBSTETRICS & GYNECOLOGY

## 2021-06-07 PROCEDURE — 99999 PR PBB SHADOW E&M-EST. PATIENT-LVL II: CPT | Mod: PBBFAC,,, | Performed by: OBSTETRICS & GYNECOLOGY

## 2021-06-07 PROCEDURE — 88141 CYTOPATH C/V INTERPRET: CPT | Mod: ,,, | Performed by: PATHOLOGY

## 2021-06-07 PROCEDURE — 99213 OFFICE O/P EST LOW 20 MIN: CPT | Mod: S$PBB,,, | Performed by: OBSTETRICS & GYNECOLOGY

## 2021-06-07 RX ORDER — LABETALOL 200 MG/1
200 TABLET, FILM COATED ORAL 2 TIMES DAILY
Qty: 60 TABLET | Refills: 11 | OUTPATIENT
Start: 2021-06-07 | End: 2021-10-31

## 2021-06-09 ENCOUNTER — PATIENT MESSAGE (OUTPATIENT)
Dept: OBSTETRICS AND GYNECOLOGY | Facility: CLINIC | Age: 26
End: 2021-06-09

## 2021-06-14 LAB
FINAL PATHOLOGIC DIAGNOSIS: ABNORMAL
Lab: ABNORMAL

## 2021-07-18 PROCEDURE — 99284 EMERGENCY DEPT VISIT MOD MDM: CPT | Mod: 25

## 2021-07-18 PROCEDURE — U0002 COVID-19 LAB TEST NON-CDC: HCPCS | Performed by: EMERGENCY MEDICINE

## 2021-07-19 ENCOUNTER — HOSPITAL ENCOUNTER (EMERGENCY)
Facility: OTHER | Age: 26
Discharge: HOME | End: 2021-07-19
Attending: EMERGENCY MEDICINE
Payer: MEDICAID

## 2021-07-19 ENCOUNTER — TELEPHONE (OUTPATIENT)
Dept: OBSTETRICS AND GYNECOLOGY | Facility: CLINIC | Age: 26
End: 2021-07-19

## 2021-07-19 ENCOUNTER — PATIENT MESSAGE (OUTPATIENT)
Dept: ADMINISTRATIVE | Facility: CLINIC | Age: 26
End: 2021-07-19

## 2021-07-19 VITALS
HEIGHT: 62 IN | WEIGHT: 150 LBS | OXYGEN SATURATION: 98 % | SYSTOLIC BLOOD PRESSURE: 148 MMHG | TEMPERATURE: 98 F | DIASTOLIC BLOOD PRESSURE: 96 MMHG | BODY MASS INDEX: 27.6 KG/M2 | RESPIRATION RATE: 18 BRPM | HEART RATE: 92 BPM

## 2021-07-19 DIAGNOSIS — U07.1 COVID-19 VIRUS INFECTION: Primary | ICD-10-CM

## 2021-07-19 LAB
CTP QC/QA: YES
SARS-COV-2 RDRP RESP QL NAA+PROBE: POSITIVE

## 2021-07-19 RX ORDER — BENZONATATE 100 MG/1
100 CAPSULE ORAL 3 TIMES DAILY PRN
Qty: 30 CAPSULE | Refills: 0 | Status: SHIPPED | OUTPATIENT
Start: 2021-07-19 | End: 2021-07-29

## 2021-07-19 RX ORDER — ONDANSETRON 4 MG/1
4 TABLET, ORALLY DISINTEGRATING ORAL EVERY 6 HOURS PRN
Qty: 20 TABLET | Refills: 0 | OUTPATIENT
Start: 2021-07-19 | End: 2021-10-31

## 2021-07-19 RX ORDER — FLUTICASONE PROPIONATE 50 MCG
1 SPRAY, SUSPENSION (ML) NASAL 2 TIMES DAILY PRN
Qty: 15 G | Refills: 0 | OUTPATIENT
Start: 2021-07-19 | End: 2021-10-31

## 2021-07-19 RX ORDER — PROMETHAZINE HYDROCHLORIDE 25 MG/1
25 TABLET ORAL EVERY 6 HOURS PRN
Qty: 25 TABLET | Refills: 0 | OUTPATIENT
Start: 2021-07-19 | End: 2021-10-31

## 2021-07-20 ENCOUNTER — PATIENT MESSAGE (OUTPATIENT)
Dept: ADMINISTRATIVE | Facility: OTHER | Age: 26
End: 2021-07-20

## 2021-07-21 ENCOUNTER — PATIENT MESSAGE (OUTPATIENT)
Dept: ADMINISTRATIVE | Facility: CLINIC | Age: 26
End: 2021-07-21

## 2021-07-21 ENCOUNTER — TELEPHONE (OUTPATIENT)
Dept: ADMINISTRATIVE | Facility: OTHER | Age: 26
End: 2021-07-21

## 2021-07-22 ENCOUNTER — NURSE TRIAGE (OUTPATIENT)
Dept: ADMINISTRATIVE | Facility: CLINIC | Age: 26
End: 2021-07-22

## 2021-07-22 ENCOUNTER — PATIENT MESSAGE (OUTPATIENT)
Dept: ADMINISTRATIVE | Facility: CLINIC | Age: 26
End: 2021-07-22

## 2021-07-23 ENCOUNTER — NURSE TRIAGE (OUTPATIENT)
Dept: ADMINISTRATIVE | Facility: CLINIC | Age: 26
End: 2021-07-23

## 2021-10-24 ENCOUNTER — HOSPITAL ENCOUNTER (EMERGENCY)
Facility: OTHER | Age: 26
Discharge: HOME | End: 2021-10-24
Attending: EMERGENCY MEDICINE
Payer: MEDICAID

## 2021-10-24 VITALS
SYSTOLIC BLOOD PRESSURE: 149 MMHG | OXYGEN SATURATION: 99 % | RESPIRATION RATE: 18 BRPM | BODY MASS INDEX: 29.45 KG/M2 | TEMPERATURE: 98 F | HEIGHT: 60 IN | HEART RATE: 74 BPM | DIASTOLIC BLOOD PRESSURE: 81 MMHG | WEIGHT: 150 LBS

## 2021-10-24 DIAGNOSIS — M79.10 MYALGIA: Primary | ICD-10-CM

## 2021-10-24 LAB
CTP QC/QA: YES
SARS-COV-2 RDRP RESP QL NAA+PROBE: NEGATIVE

## 2021-10-24 PROCEDURE — 99284 EMERGENCY DEPT VISIT MOD MDM: CPT | Mod: 25

## 2021-10-24 PROCEDURE — U0002 COVID-19 LAB TEST NON-CDC: HCPCS | Performed by: EMERGENCY MEDICINE

## 2021-10-24 RX ORDER — IBUPROFEN 800 MG/1
800 TABLET ORAL EVERY 6 HOURS PRN
Qty: 30 TABLET | Refills: 0 | Status: SHIPPED | OUTPATIENT
Start: 2021-10-24 | End: 2023-10-23

## 2021-10-24 RX ORDER — METHOCARBAMOL 500 MG/1
1000 TABLET, FILM COATED ORAL 3 TIMES DAILY PRN
Qty: 20 TABLET | Refills: 0 | Status: SHIPPED | OUTPATIENT
Start: 2021-10-24 | End: 2021-10-29

## 2021-10-31 ENCOUNTER — HOSPITAL ENCOUNTER (EMERGENCY)
Facility: OTHER | Age: 26
Discharge: HOME | End: 2021-10-31
Attending: EMERGENCY MEDICINE
Payer: MEDICAID

## 2021-10-31 VITALS
BODY MASS INDEX: 29.45 KG/M2 | SYSTOLIC BLOOD PRESSURE: 128 MMHG | OXYGEN SATURATION: 100 % | HEART RATE: 88 BPM | HEIGHT: 60 IN | DIASTOLIC BLOOD PRESSURE: 86 MMHG | WEIGHT: 150 LBS | RESPIRATION RATE: 16 BRPM | TEMPERATURE: 98 F

## 2021-10-31 DIAGNOSIS — B96.89 BACTERIAL VAGINOSIS: ICD-10-CM

## 2021-10-31 DIAGNOSIS — N76.0 BACTERIAL VAGINOSIS: ICD-10-CM

## 2021-10-31 DIAGNOSIS — N89.8 VAGINAL DISCHARGE: Primary | ICD-10-CM

## 2021-10-31 DIAGNOSIS — Z20.2 POSSIBLE EXPOSURE TO STD: ICD-10-CM

## 2021-10-31 LAB
B-HCG UR QL: NEGATIVE
BACTERIA #/AREA URNS HPF: ABNORMAL /HPF
BACTERIA GENITAL QL WET PREP: ABNORMAL
BILIRUB UR QL STRIP: NEGATIVE
CLARITY UR: ABNORMAL
CLUE CELLS VAG QL WET PREP: ABNORMAL
COLOR UR: YELLOW
CTP QC/QA: YES
FILAMENT FUNGI VAG WET PREP-#/AREA: ABNORMAL
GLUCOSE UR QL STRIP: NEGATIVE
HGB UR QL STRIP: NEGATIVE
KETONES UR QL STRIP: NEGATIVE
LEUKOCYTE ESTERASE UR QL STRIP: ABNORMAL
MICROSCOPIC COMMENT: ABNORMAL
NITRITE UR QL STRIP: NEGATIVE
PH UR STRIP: 7 [PH] (ref 5–8)
PROT UR QL STRIP: NEGATIVE
SP GR UR STRIP: 1.02 (ref 1–1.03)
SPECIMEN SOURCE: ABNORMAL
SQUAMOUS #/AREA URNS HPF: 15 /HPF
T VAGINALIS GENITAL QL WET PREP: ABNORMAL
URN SPEC COLLECT METH UR: ABNORMAL
UROBILINOGEN UR STRIP-ACNC: 1 EU/DL
WBC #/AREA URNS HPF: 6 /HPF (ref 0–5)
WBC #/AREA VAG WET PREP: ABNORMAL
YEAST GENITAL QL WET PREP: ABNORMAL

## 2021-10-31 PROCEDURE — 87491 CHLMYD TRACH DNA AMP PROBE: CPT | Performed by: NURSE PRACTITIONER

## 2021-10-31 PROCEDURE — 25000003 PHARM REV CODE 250: Performed by: NURSE PRACTITIONER

## 2021-10-31 PROCEDURE — 87210 SMEAR WET MOUNT SALINE/INK: CPT | Performed by: NURSE PRACTITIONER

## 2021-10-31 PROCEDURE — 99284 EMERGENCY DEPT VISIT MOD MDM: CPT | Mod: 25

## 2021-10-31 PROCEDURE — 81025 URINE PREGNANCY TEST: CPT | Performed by: EMERGENCY MEDICINE

## 2021-10-31 PROCEDURE — 81000 URINALYSIS NONAUTO W/SCOPE: CPT | Performed by: EMERGENCY MEDICINE

## 2021-10-31 PROCEDURE — 87591 N.GONORRHOEAE DNA AMP PROB: CPT | Performed by: NURSE PRACTITIONER

## 2021-10-31 PROCEDURE — 87086 URINE CULTURE/COLONY COUNT: CPT | Performed by: EMERGENCY MEDICINE

## 2021-10-31 RX ORDER — CEFTRIAXONE 500 MG/1
500 INJECTION, POWDER, FOR SOLUTION INTRAMUSCULAR; INTRAVENOUS
Status: DISCONTINUED | OUTPATIENT
Start: 2021-10-31 | End: 2021-10-31 | Stop reason: HOSPADM

## 2021-10-31 RX ORDER — DOXYCYCLINE 100 MG/1
100 CAPSULE ORAL 2 TIMES DAILY
Qty: 28 CAPSULE | Refills: 0 | Status: SHIPPED | OUTPATIENT
Start: 2021-10-31 | End: 2021-11-14

## 2021-10-31 RX ORDER — DOXYCYCLINE HYCLATE 100 MG
100 TABLET ORAL
Status: COMPLETED | OUTPATIENT
Start: 2021-10-31 | End: 2021-10-31

## 2021-10-31 RX ORDER — METRONIDAZOLE 500 MG/1
500 TABLET ORAL EVERY 12 HOURS
Qty: 28 TABLET | Refills: 0 | Status: SHIPPED | OUTPATIENT
Start: 2021-10-31 | End: 2021-11-14

## 2021-10-31 RX ADMIN — DOXYCYCLINE HYCLATE 100 MG: 100 TABLET, COATED ORAL at 02:10

## 2021-11-02 LAB — BACTERIA UR CULT: NORMAL

## 2021-11-26 LAB
C TRACH DNA SPEC QL NAA+PROBE: NOT DETECTED
N GONORRHOEA DNA SPEC QL NAA+PROBE: NOT DETECTED

## 2021-12-25 ENCOUNTER — HOSPITAL ENCOUNTER (EMERGENCY)
Facility: OTHER | Age: 26
Discharge: HOME | End: 2021-12-26
Attending: EMERGENCY MEDICINE
Payer: MEDICAID

## 2021-12-25 VITALS
WEIGHT: 150 LBS | HEART RATE: 65 BPM | TEMPERATURE: 99 F | SYSTOLIC BLOOD PRESSURE: 164 MMHG | RESPIRATION RATE: 18 BRPM | HEIGHT: 60 IN | DIASTOLIC BLOOD PRESSURE: 109 MMHG | OXYGEN SATURATION: 100 % | BODY MASS INDEX: 29.45 KG/M2

## 2021-12-25 DIAGNOSIS — S20.229A CONTUSION OF BACK, UNSPECIFIED LATERALITY, INITIAL ENCOUNTER: ICD-10-CM

## 2021-12-25 DIAGNOSIS — U07.1 COVID-19: Primary | ICD-10-CM

## 2021-12-25 LAB
CTP QC/QA: YES
SARS-COV-2 RDRP RESP QL NAA+PROBE: POSITIVE

## 2021-12-25 PROCEDURE — U0002 COVID-19 LAB TEST NON-CDC: HCPCS | Performed by: EMERGENCY MEDICINE

## 2021-12-25 PROCEDURE — 99282 EMERGENCY DEPT VISIT SF MDM: CPT

## 2021-12-25 RX ORDER — LIDOCAINE 50 MG/G
1 PATCH TOPICAL
Status: DISCONTINUED | OUTPATIENT
Start: 2021-12-26 | End: 2021-12-26 | Stop reason: HOSPADM

## 2021-12-25 NOTE — Clinical Note
"Gurdeep Harris ([place]) was seen and treated in our emergency department on 12/25/2021.     COVID-19 is present in our communities across the state. There is limited testing for COVID at this time, so not all patients can be tested. In this situation, your employee meets the following criteria:    Gurdeep Harris has met the criteria for COVID-19 testing and has a POSITIVE result. She can return to work once they are asymptomatic for 72 hours without the use of fever reducing medications AND at least ten days from the first positive result.     If you have any questions or concerns, or if I can be of further assistance, please do not hesitate to contact me.    Sincerely,             Latonia Nation MD"

## 2021-12-26 PROCEDURE — 25000003 PHARM REV CODE 250: Performed by: EMERGENCY MEDICINE

## 2021-12-26 RX ADMIN — LIDOCAINE 5% 1 PATCH: 700 PATCH TOPICAL at 12:12

## 2021-12-26 NOTE — ED PROVIDER NOTES
CHIEF COMPLAINT:   Chief Complaint   Patient presents with    Fall     C/o middle back pain after falling at work 2 days ago, also c/o cough and runny nose    Cough       HISTORY OF PRESENT ILLNESS: Gurdeep JUAREZ  who is a 26 y.o. presents to the emergency department today with complaint of cough and and nasal congestion x 3 days. Additionally had a fall at work with upper back pain. Pt is not vaccinated v covid. No known sick contacts. Pt denies bowel or bladder incontinence.     REVIEW OF SYSTEMS:  Constitutional: - fever, +chills.  Eyes: No discharge. No pain.  HENT: no nasal congestion, -anosmia; No sore throat.   Cardiovascular: No chest pain, no palpitations.  Respiratory: +cough, -shortness of breath.  Gastrointestinal: no nausea, no diarrhea, No abdominal pain, no vomiting.   Genitourinary: No hematuria, dysuria, urgency.  Musculoskeletal: + back pain.  Skin: No rashes, no lesions.  Neurological: -headache, no focal weakness.    Otherwise remaining ROS negative     ALLERGIES REVIEWED  MEDICATIONS REVIEWED  PMH/PSH/SOC/FH REVIEWED     The history is provided by the patient.    Nursing/Ancillary staff note reviewed.        PHYSICAL EXAM:  VS reviewed  Vitals:    12/25/21 2236   BP: (!) 164/109   Pulse: 65   Resp: 18   Temp: 98.8 °F (37.1 °C)       A full-contact physical exam was not possible due to the clinical condition of the patient due to suspected covid 19 pandemic and the necessity to minimize exposure.     General Appearance: The patient is alert, has no immediate or signs of toxicity. No acute distress.    HEENT:  Extra ocular movements intact. No drainage.   Neck: No stridor.   Respiratory: No increased work of breathing, speaking in full sentences  Gastrointestinal:   No guarding  Neurological: Alert and appropriate, moving all extremities spontaneously  Skin: Warm and dry, no rashes.  Musculoskeletal: Extremities without notable edema, appropriate ROM , no midline vert tenderness    Past  Medical History:   Diagnosis Date    Anemia          No past surgical history on file.      ED COURSE:     Patient presenting with general illness symptoms; appears well and nontoxic. Exam grossly unremarkable at this time.    DIFFERENTIAL DIAGNOSIS: After history and physical exam a differential diagnosis was considered, but was not limited to,   Sepsis, meningitis, bacterial sinusitis, allergic rhinitis, influenza, COVID19, bacterial/viral pharyngitis, bacterial/viral pneumonia.    ED management: Patient seen for a viral-like illness, therefore due to the most recent recommendations from our hospital administrations/infectious disease at this time, the patient will be swabbed for COVID 19. Rapid COVID is positive. Ambulatory trial reveals no hypoxia. Instructed patient on symptomatic treatment and increase oral hydration. Vital signs did not indicate sepsis and patient was welling appearing, okay for discharge home.    Management decisions for this encounter made amidst early, highly dynamic phase of COVID19 public health emergency; workup standards and admission vs. discharge standards have necessarily shifted.     Doubt PNA, sepsis, other serious bacterial infection at this time to warrant admission. The patient s otherwise well-appearing with acceptable vitals, a reassuring physical exam,  and lacks serious medical comorbidities that would require admission and desiring to trial home treatment. Patient is nontoxic and although symptomatic, otherwise safe to go home at this time. Will provide strict return precautions and instructions on self-isolation/quarantine and anticipatory guidance.       The patient's back pain is likely a musculoskeletal strain. There are no signs of saddle anesthesia, incontinence, neurologic deficits, fevers, trauma or midline tenderness on history or physical to suggest cauda equina, infectious process, fracture or subluxation.  History and physical exam seem most consistent with  mechanical low back pain (i.e lumbosacral radiculitis, spondylosis, spinal canal stenosis, disc herniation). Nothing on history or physical exam to suggest trauma. No red flag symptoms of bowel or bladder incontinence, saddle anesthesia, fevers or recent surgeries. I have considered on the differential, as I would in anyone presenting to the ED with back pain: non mechanical causes of low back pain (neoplasm, infection or inflammatory arthropathy) as well as visceral causes such as nephrolithiasis, pancreatitis, PID, endometriosis, AAA.    Given patient's reassuring history and physical exam, absence of alarming features and red flag symptoms- I will forgo testing and imaging, and treat conservatively for suspected mechanical low back pain with cautious use of antiinflammatories.        IMPRESSION  The primary encounter diagnosis was COVID-19. A diagnosis of Contusion of back, unspecified laterality, initial encounter was also pertinent to this visit. Strict instructions to follow up with primary care physician or reference provided for further assessment and evaluation. Given instructions to return for any acute symptoms and verbalized understanding of this medical plan.            Discussed need for quarantine and strict return precautions.                        Latonia Nation MD  12/26/21 0143

## 2022-09-06 ENCOUNTER — OFFICE VISIT (OUTPATIENT)
Dept: OBSTETRICS AND GYNECOLOGY | Facility: CLINIC | Age: 27
End: 2022-09-06
Payer: MEDICAID

## 2022-09-06 VITALS — WEIGHT: 156.5 LBS | DIASTOLIC BLOOD PRESSURE: 88 MMHG | BODY MASS INDEX: 30.57 KG/M2 | SYSTOLIC BLOOD PRESSURE: 124 MMHG

## 2022-09-06 DIAGNOSIS — Z01.419 ENCOUNTER FOR GYNECOLOGICAL EXAMINATION WITHOUT ABNORMAL FINDING: Primary | ICD-10-CM

## 2022-09-06 DIAGNOSIS — B37.9 YEAST INFECTION: ICD-10-CM

## 2022-09-06 DIAGNOSIS — Z12.39 SCREENING BREAST EXAMINATION: ICD-10-CM

## 2022-09-06 DIAGNOSIS — Z11.3 SCREEN FOR STD (SEXUALLY TRANSMITTED DISEASE): ICD-10-CM

## 2022-09-06 PROCEDURE — 88175 CYTOPATH C/V AUTO FLUID REDO: CPT | Performed by: PATHOLOGY

## 2022-09-06 PROCEDURE — 3074F PR MOST RECENT SYSTOLIC BLOOD PRESSURE < 130 MM HG: ICD-10-PCS | Mod: CPTII,,, | Performed by: OBSTETRICS & GYNECOLOGY

## 2022-09-06 PROCEDURE — 99999 PR PBB SHADOW E&M-EST. PATIENT-LVL II: CPT | Mod: PBBFAC,,, | Performed by: OBSTETRICS & GYNECOLOGY

## 2022-09-06 PROCEDURE — 99999 PR PBB SHADOW E&M-EST. PATIENT-LVL II: ICD-10-PCS | Mod: PBBFAC,,, | Performed by: OBSTETRICS & GYNECOLOGY

## 2022-09-06 PROCEDURE — 99212 OFFICE O/P EST SF 10 MIN: CPT | Mod: PBBFAC | Performed by: OBSTETRICS & GYNECOLOGY

## 2022-09-06 PROCEDURE — 3079F DIAST BP 80-89 MM HG: CPT | Mod: CPTII,,, | Performed by: OBSTETRICS & GYNECOLOGY

## 2022-09-06 PROCEDURE — 87491 CHLMYD TRACH DNA AMP PROBE: CPT | Mod: 59 | Performed by: OBSTETRICS & GYNECOLOGY

## 2022-09-06 PROCEDURE — 99395 PREV VISIT EST AGE 18-39: CPT | Mod: S$PBB,,, | Performed by: OBSTETRICS & GYNECOLOGY

## 2022-09-06 PROCEDURE — 3008F BODY MASS INDEX DOCD: CPT | Mod: CPTII,,, | Performed by: OBSTETRICS & GYNECOLOGY

## 2022-09-06 PROCEDURE — 87801 DETECT AGNT MULT DNA AMPLI: CPT | Performed by: OBSTETRICS & GYNECOLOGY

## 2022-09-06 PROCEDURE — 88141 PR  CYTOPATH CERV/VAG INTERPRET: ICD-10-PCS | Mod: ,,, | Performed by: PATHOLOGY

## 2022-09-06 PROCEDURE — 88141 CYTOPATH C/V INTERPRET: CPT | Mod: ,,, | Performed by: PATHOLOGY

## 2022-09-06 PROCEDURE — 3079F PR MOST RECENT DIASTOLIC BLOOD PRESSURE 80-89 MM HG: ICD-10-PCS | Mod: CPTII,,, | Performed by: OBSTETRICS & GYNECOLOGY

## 2022-09-06 PROCEDURE — 99395 PR PREVENTIVE VISIT,EST,18-39: ICD-10-PCS | Mod: S$PBB,,, | Performed by: OBSTETRICS & GYNECOLOGY

## 2022-09-06 PROCEDURE — 87481 CANDIDA DNA AMP PROBE: CPT | Mod: 59 | Performed by: OBSTETRICS & GYNECOLOGY

## 2022-09-06 PROCEDURE — 3074F SYST BP LT 130 MM HG: CPT | Mod: CPTII,,, | Performed by: OBSTETRICS & GYNECOLOGY

## 2022-09-06 PROCEDURE — 3008F PR BODY MASS INDEX (BMI) DOCUMENTED: ICD-10-PCS | Mod: CPTII,,, | Performed by: OBSTETRICS & GYNECOLOGY

## 2022-09-06 PROCEDURE — 87591 N.GONORRHOEAE DNA AMP PROB: CPT | Mod: 59 | Performed by: OBSTETRICS & GYNECOLOGY

## 2022-09-06 RX ORDER — NORGESTIMATE AND ETHINYL ESTRADIOL 0.25-0.035
1 KIT ORAL DAILY
Qty: 28 TABLET | Refills: 5 | Status: SHIPPED | OUTPATIENT
Start: 2022-09-06 | End: 2023-11-17

## 2022-09-06 RX ORDER — FLUCONAZOLE 150 MG/1
150 TABLET ORAL DAILY
Qty: 1 TABLET | Refills: 0 | Status: SHIPPED | OUTPATIENT
Start: 2022-09-06 | End: 2022-09-07
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
## 2022-09-07 LAB
C TRACH DNA SPEC QL NAA+PROBE: NOT DETECTED
N GONORRHOEA DNA SPEC QL NAA+PROBE: NOT DETECTED

## 2022-09-13 LAB
FINAL PATHOLOGIC DIAGNOSIS: NORMAL
Lab: NORMAL

## 2022-09-14 ENCOUNTER — PATIENT MESSAGE (OUTPATIENT)
Dept: OBSTETRICS AND GYNECOLOGY | Facility: CLINIC | Age: 27
End: 2022-09-14
Payer: MEDICAID

## 2022-09-14 ENCOUNTER — TELEPHONE (OUTPATIENT)
Dept: OBSTETRICS AND GYNECOLOGY | Facility: CLINIC | Age: 27
End: 2022-09-14
Payer: MEDICAID

## 2022-09-14 NOTE — TELEPHONE ENCOUNTER
Called to schedule pt's IUD insertion. Pt's mother answered pt's phone. Advised she is at work and cannot talk. Will have pt call office in the morning

## 2022-09-19 ENCOUNTER — PATIENT MESSAGE (OUTPATIENT)
Dept: OBSTETRICS AND GYNECOLOGY | Facility: CLINIC | Age: 27
End: 2022-09-19
Payer: MEDICAID

## 2022-10-03 ENCOUNTER — PATIENT MESSAGE (OUTPATIENT)
Dept: OBSTETRICS AND GYNECOLOGY | Facility: CLINIC | Age: 27
End: 2022-10-03
Payer: MEDICAID

## 2022-10-03 DIAGNOSIS — B37.9 YEAST INFECTION: Primary | ICD-10-CM

## 2022-10-04 ENCOUNTER — PATIENT MESSAGE (OUTPATIENT)
Dept: OBSTETRICS AND GYNECOLOGY | Facility: CLINIC | Age: 27
End: 2022-10-04
Payer: MEDICAID

## 2022-10-04 RX ORDER — FLUCONAZOLE 150 MG/1
150 TABLET ORAL DAILY
Qty: 1 TABLET | Refills: 0 | Status: SHIPPED | OUTPATIENT
Start: 2022-10-04 | End: 2022-10-05

## 2022-10-06 ENCOUNTER — PATIENT MESSAGE (OUTPATIENT)
Dept: OBSTETRICS AND GYNECOLOGY | Facility: CLINIC | Age: 27
End: 2022-10-06
Payer: MEDICAID

## 2022-11-30 ENCOUNTER — PATIENT MESSAGE (OUTPATIENT)
Dept: OBSTETRICS AND GYNECOLOGY | Facility: CLINIC | Age: 27
End: 2022-11-30
Payer: MEDICAID

## 2023-06-04 ENCOUNTER — OCCUPATIONAL HEALTH (OUTPATIENT)
Dept: URGENT CARE | Facility: CLINIC | Age: 28
End: 2023-06-04

## 2023-06-04 ENCOUNTER — OFFICE VISIT (OUTPATIENT)
Dept: URGENT CARE | Facility: CLINIC | Age: 28
End: 2023-06-04
Payer: OTHER MISCELLANEOUS

## 2023-06-04 VITALS
OXYGEN SATURATION: 98 % | BODY MASS INDEX: 30.63 KG/M2 | RESPIRATION RATE: 18 BRPM | WEIGHT: 156 LBS | HEART RATE: 88 BPM | HEIGHT: 60 IN | SYSTOLIC BLOOD PRESSURE: 136 MMHG | TEMPERATURE: 98 F | DIASTOLIC BLOOD PRESSURE: 97 MMHG

## 2023-06-04 DIAGNOSIS — S49.91XA INJURY OF RIGHT UPPER EXTREMITY, INITIAL ENCOUNTER: Primary | ICD-10-CM

## 2023-06-04 DIAGNOSIS — Z02.83 ENCOUNTER FOR DRUG SCREENING: Primary | ICD-10-CM

## 2023-06-04 LAB — BREATH ALCOHOL: 0

## 2023-06-04 PROCEDURE — 80305 OOH NON-DOT DRUG SCREEN: ICD-10-PCS | Mod: S$GLB,,,

## 2023-06-04 PROCEDURE — 99203 OFFICE O/P NEW LOW 30 MIN: CPT | Mod: S$GLB,,,

## 2023-06-04 PROCEDURE — 82075 ASSAY OF BREATH ETHANOL: CPT | Mod: S$GLB,,,

## 2023-06-04 PROCEDURE — 82075 POCT BREATH ALCOHOL TEST: ICD-10-PCS | Mod: S$GLB,,,

## 2023-06-04 PROCEDURE — 73090 X-RAY EXAM OF FOREARM: CPT | Mod: RT,S$GLB,, | Performed by: RADIOLOGY

## 2023-06-04 PROCEDURE — 99203 PR OFFICE/OUTPT VISIT, NEW, LEVL III, 30-44 MIN: ICD-10-PCS | Mod: S$GLB,,,

## 2023-06-04 PROCEDURE — 73070 XR ELBOW 2 VIEWS RIGHT: ICD-10-PCS | Mod: RT,S$GLB,, | Performed by: RADIOLOGY

## 2023-06-04 PROCEDURE — 73070 X-RAY EXAM OF ELBOW: CPT | Mod: RT,S$GLB,, | Performed by: RADIOLOGY

## 2023-06-04 PROCEDURE — 80305 DRUG TEST PRSMV DIR OPT OBS: CPT | Mod: S$GLB,,,

## 2023-06-04 PROCEDURE — 73090 XR FOREARM RIGHT: ICD-10-PCS | Mod: RT,S$GLB,, | Performed by: RADIOLOGY

## 2023-06-04 RX ORDER — HYDROCHLOROTHIAZIDE 25 MG/1
25 TABLET ORAL
COMMUNITY
Start: 2023-04-10 | End: 2023-11-17
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
## 2023-06-04 NOTE — PATIENT INSTRUCTIONS
Rest, ice, and elevate arm.  Take ibuprofen or Tylenol as needed for your pain.    - Rest.    - Drink plenty of fluids.    - Acetaminophen (tylenol) or Ibuprofen (advil,motrin) as directed as needed for fever/pain. Avoid tylenol if you have a history of liver disease. Do not take ibuprofen if you have a history of GI bleeding, kidney disease, or if you take blood thinners.     - Follow up with your PCP or specialty clinic as directed in the next 1-2 weeks if not improved or as needed.  You can call [phone] to schedule an appointment with the appropriate provider.    - Go to the ER or seek medical attention immediately if you develop new or worsening symptoms.     - You must understand that you have received an Urgent Care treatment only and that you may be released before all of your medical problems are known or treated.   - You, the patient, will arrange for follow up care as instructed.   - If your condition worsens or fails to improve we recommend that you receive another evaluation at the ER immediately or contact your PCP to discuss your concerns or return here.
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
## 2023-06-05 ENCOUNTER — OFFICE VISIT (OUTPATIENT)
Dept: URGENT CARE | Facility: CLINIC | Age: 28
End: 2023-06-05
Payer: OTHER MISCELLANEOUS

## 2023-06-05 DIAGNOSIS — S50.11XA CONTUSION OF RIGHT FOREARM, INITIAL ENCOUNTER: Primary | ICD-10-CM

## 2023-06-05 PROCEDURE — 99203 PR OFFICE/OUTPT VISIT, NEW, LEVL III, 30-44 MIN: ICD-10-PCS | Mod: S$GLB,,, | Performed by: EMERGENCY MEDICINE

## 2023-06-05 PROCEDURE — 99203 OFFICE O/P NEW LOW 30 MIN: CPT | Mod: S$GLB,,, | Performed by: EMERGENCY MEDICINE

## 2023-06-05 NOTE — LETTER
[facility] - Urgent Care  [address] [facility], [address]  [place] [address]  Phone: [phone]  Fax: [phone]  [facility] [facility]: [id number]    Pt Name: Gurdeep JUAREZ   Injury Date: 06/03/2023   Employee ID: [id number] Date of First Treatment: 06/05/2023   Company: [employer] reference to record [employer]      Appointment Time: 11:50 AM Arrived: 12:00 pm   Provider: Ciro Hi MD Time Out: 01:10 pm     Office Treatment:   1. Contusion of right forearm, initial encounter          Patient Instructions: Attention not to aggravate affected area, Use splint as directed    Restrictions: Limited use of right hand and arm (ALLOW TO WEAR BRACE)     Return Appointment: 6/12/2023 at 10:00 am  JULIANE
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
## 2023-06-12 ENCOUNTER — OFFICE VISIT (OUTPATIENT)
Dept: URGENT CARE | Facility: CLINIC | Age: 28
End: 2023-06-12
Payer: OTHER MISCELLANEOUS

## 2023-06-12 DIAGNOSIS — S50.11XD CONTUSION OF RIGHT FOREARM, SUBSEQUENT ENCOUNTER: Primary | ICD-10-CM

## 2023-06-12 PROCEDURE — 99214 PR OFFICE/OUTPT VISIT, EST, LEVL IV, 30-39 MIN: ICD-10-PCS | Mod: S$GLB,,, | Performed by: EMERGENCY MEDICINE

## 2023-06-12 PROCEDURE — 99214 OFFICE O/P EST MOD 30 MIN: CPT | Mod: S$GLB,,, | Performed by: EMERGENCY MEDICINE

## 2023-06-12 NOTE — LETTER
[facility] - Urgent Care  [address] [facility], [address]  [place] [address]  Phone: [phone]  Fax: [phone]  [facility] [employer]: [id number]    Pt Name: Gurdeep JUAREZ   Injury Date: 06/03/2023   Employee ID: [id number] Date of Treatment: 06/12/2023   Company: [employer] to [id number]      Appointment Time: 10:00 am Arrived: 09:54 am   Provider: Ciro Hi MD Time Out: 10:40 am     Office Treatment:   1. Contusion of right forearm, subsequent encounter          Patient Instructions: Attention not to aggravate affected area    Restrictions: Regular Duty, Discharged from Occupational Health     Return Appointment: None.  Follow up if symptoms worsen or fail to improve.   JULIANE           
Spine appears normal, range of motion is not limited, no muscle or joint tenderness
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
## 2023-10-17 ENCOUNTER — TELEPHONE (OUTPATIENT)
Dept: OBSTETRICS AND GYNECOLOGY | Facility: CLINIC | Age: 28
End: 2023-10-17
Payer: MEDICAID

## 2023-10-17 NOTE — TELEPHONE ENCOUNTER
Please contact pt at your convenience and help her schedule an appointment for confirmation with Nuvia and then with Dr. Berg.

## 2023-10-17 NOTE — TELEPHONE ENCOUNTER
----- Message from Cassandra Rome sent at 10/17/2023  3:42 PM CDT -----  Needs advice from nurse:      Who Called:pt  Regarding:needs to schedule virtual visit for pregnancy confirmation  Would the patient [facility] a call back [facility]?  Best Call Back number:[phone]/  Additional Info:

## 2023-10-23 ENCOUNTER — OFFICE VISIT (OUTPATIENT)
Dept: OBSTETRICS AND GYNECOLOGY | Facility: CLINIC | Age: 28
End: 2023-10-23
Payer: MEDICAID

## 2023-10-23 ENCOUNTER — LAB VISIT (OUTPATIENT)
Dept: LAB | Facility: HOSPITAL | Age: 28
End: 2023-10-23
Attending: PHYSICIAN ASSISTANT
Payer: MEDICAID

## 2023-10-23 VITALS
DIASTOLIC BLOOD PRESSURE: 70 MMHG | SYSTOLIC BLOOD PRESSURE: 110 MMHG | WEIGHT: 152.13 LBS | BODY MASS INDEX: 29.71 KG/M2

## 2023-10-23 DIAGNOSIS — N91.4 SECONDARY OLIGOMENORRHEA: Primary | ICD-10-CM

## 2023-10-23 DIAGNOSIS — O09.291 HISTORY OF PRE-ECLAMPSIA IN PRIOR PREGNANCY, CURRENTLY PREGNANT IN FIRST TRIMESTER: ICD-10-CM

## 2023-10-23 DIAGNOSIS — Z32.01 POSITIVE PREGNANCY TEST: ICD-10-CM

## 2023-10-23 DIAGNOSIS — N91.4 SECONDARY OLIGOMENORRHEA: ICD-10-CM

## 2023-10-23 LAB
ABO + RH BLD: NORMAL
ANION GAP SERPL CALC-SCNC: 5 MMOL/L (ref 8–16)
BASOPHILS # BLD AUTO: 0.05 K/UL (ref 0–0.2)
BASOPHILS NFR BLD: 1 % (ref 0–1.9)
BLD GP AB SCN CELLS X3 SERPL QL: NORMAL
BUN SERPL-MCNC: 6 MG/DL (ref 6–20)
CALCIUM SERPL-MCNC: 9.1 MG/DL (ref 8.7–10.5)
CHLORIDE SERPL-SCNC: 109 MMOL/L (ref 95–110)
CO2 SERPL-SCNC: 26 MMOL/L (ref 23–29)
CREAT SERPL-MCNC: 0.7 MG/DL (ref 0.5–1.4)
DIFFERENTIAL METHOD: ABNORMAL
EOSINOPHIL # BLD AUTO: 0.2 K/UL (ref 0–0.5)
EOSINOPHIL NFR BLD: 3.6 % (ref 0–8)
ERYTHROCYTE [DISTWIDTH] IN BLOOD BY AUTOMATED COUNT: 13.5 % (ref 11.5–14.5)
EST. GFR  (NO RACE VARIABLE): >60 ML/MIN/1.73 M^2
GLUCOSE SERPL-MCNC: 89 MG/DL (ref 70–110)
HCT VFR BLD AUTO: 31.8 % (ref 37–48.5)
HGB BLD-MCNC: 9.8 G/DL (ref 12–16)
IMM GRANULOCYTES # BLD AUTO: 0.01 K/UL (ref 0–0.04)
IMM GRANULOCYTES NFR BLD AUTO: 0.2 % (ref 0–0.5)
LYMPHOCYTES # BLD AUTO: 1.5 K/UL (ref 1–4.8)
LYMPHOCYTES NFR BLD: 30.6 % (ref 18–48)
MCH RBC QN AUTO: 25.3 PG (ref 27–31)
MCHC RBC AUTO-ENTMCNC: 30.8 G/DL (ref 32–36)
MCV RBC AUTO: 82 FL (ref 82–98)
MONOCYTES # BLD AUTO: 0.4 K/UL (ref 0.3–1)
MONOCYTES NFR BLD: 7.8 % (ref 4–15)
NEUTROPHILS # BLD AUTO: 2.8 K/UL (ref 1.8–7.7)
NEUTROPHILS NFR BLD: 56.8 % (ref 38–73)
NRBC BLD-RTO: 0 /100 WBC
PLATELET # BLD AUTO: 410 K/UL (ref 150–450)
PMV BLD AUTO: 9.3 FL (ref 9.2–12.9)
POTASSIUM SERPL-SCNC: 3.4 MMOL/L (ref 3.5–5.1)
RBC # BLD AUTO: 3.87 M/UL (ref 4–5.4)
SODIUM SERPL-SCNC: 140 MMOL/L (ref 136–145)
SPECIMEN OUTDATE: NORMAL
WBC # BLD AUTO: 5 K/UL (ref 3.9–12.7)

## 2023-10-23 PROCEDURE — 99213 OFFICE O/P EST LOW 20 MIN: CPT | Mod: PBBFAC,TH | Performed by: PHYSICIAN ASSISTANT

## 2023-10-23 PROCEDURE — 36415 COLL VENOUS BLD VENIPUNCTURE: CPT | Performed by: PHYSICIAN ASSISTANT

## 2023-10-23 PROCEDURE — 87340 HEPATITIS B SURFACE AG IA: CPT | Performed by: PHYSICIAN ASSISTANT

## 2023-10-23 PROCEDURE — 3074F PR MOST RECENT SYSTOLIC BLOOD PRESSURE < 130 MM HG: ICD-10-PCS | Mod: CPTII,,, | Performed by: PHYSICIAN ASSISTANT

## 2023-10-23 PROCEDURE — 3078F DIAST BP <80 MM HG: CPT | Mod: CPTII,,, | Performed by: PHYSICIAN ASSISTANT

## 2023-10-23 PROCEDURE — 86900 BLOOD TYPING SEROLOGIC ABO: CPT | Performed by: PHYSICIAN ASSISTANT

## 2023-10-23 PROCEDURE — 1159F MED LIST DOCD IN RCRD: CPT | Mod: CPTII,,, | Performed by: PHYSICIAN ASSISTANT

## 2023-10-23 PROCEDURE — 86762 RUBELLA ANTIBODY: CPT | Performed by: PHYSICIAN ASSISTANT

## 2023-10-23 PROCEDURE — 86803 HEPATITIS C AB TEST: CPT | Performed by: PHYSICIAN ASSISTANT

## 2023-10-23 PROCEDURE — 99999 PR PBB SHADOW E&M-EST. PATIENT-LVL III: CPT | Mod: PBBFAC,,, | Performed by: PHYSICIAN ASSISTANT

## 2023-10-23 PROCEDURE — 3008F PR BODY MASS INDEX (BMI) DOCUMENTED: ICD-10-PCS | Mod: CPTII,,, | Performed by: PHYSICIAN ASSISTANT

## 2023-10-23 PROCEDURE — 99204 PR OFFICE/OUTPT VISIT, NEW, LEVL IV, 45-59 MIN: ICD-10-PCS | Mod: S$PBB,TH,, | Performed by: PHYSICIAN ASSISTANT

## 2023-10-23 PROCEDURE — 3074F SYST BP LT 130 MM HG: CPT | Mod: CPTII,,, | Performed by: PHYSICIAN ASSISTANT

## 2023-10-23 PROCEDURE — 86592 SYPHILIS TEST NON-TREP QUAL: CPT | Performed by: PHYSICIAN ASSISTANT

## 2023-10-23 PROCEDURE — 87086 URINE CULTURE/COLONY COUNT: CPT | Performed by: PHYSICIAN ASSISTANT

## 2023-10-23 PROCEDURE — 99999 PR PBB SHADOW E&M-EST. PATIENT-LVL III: ICD-10-PCS | Mod: PBBFAC,,, | Performed by: PHYSICIAN ASSISTANT

## 2023-10-23 PROCEDURE — 99204 OFFICE O/P NEW MOD 45 MIN: CPT | Mod: S$PBB,TH,, | Performed by: PHYSICIAN ASSISTANT

## 2023-10-23 PROCEDURE — 87591 N.GONORRHOEAE DNA AMP PROB: CPT | Performed by: PHYSICIAN ASSISTANT

## 2023-10-23 PROCEDURE — 83036 HEMOGLOBIN GLYCOSYLATED A1C: CPT | Performed by: PHYSICIAN ASSISTANT

## 2023-10-23 PROCEDURE — 1159F PR MEDICATION LIST DOCUMENTED IN MEDICAL RECORD: ICD-10-PCS | Mod: CPTII,,, | Performed by: PHYSICIAN ASSISTANT

## 2023-10-23 PROCEDURE — 87389 HIV-1 AG W/HIV-1&-2 AB AG IA: CPT | Performed by: PHYSICIAN ASSISTANT

## 2023-10-23 PROCEDURE — 85025 COMPLETE CBC W/AUTO DIFF WBC: CPT | Performed by: PHYSICIAN ASSISTANT

## 2023-10-23 PROCEDURE — 80048 BASIC METABOLIC PNL TOTAL CA: CPT | Performed by: PHYSICIAN ASSISTANT

## 2023-10-23 PROCEDURE — 87491 CHLMYD TRACH DNA AMP PROBE: CPT | Performed by: PHYSICIAN ASSISTANT

## 2023-10-23 PROCEDURE — 3078F PR MOST RECENT DIASTOLIC BLOOD PRESSURE < 80 MM HG: ICD-10-PCS | Mod: CPTII,,, | Performed by: PHYSICIAN ASSISTANT

## 2023-10-23 PROCEDURE — 3008F BODY MASS INDEX DOCD: CPT | Mod: CPTII,,, | Performed by: PHYSICIAN ASSISTANT

## 2023-10-23 NOTE — PROGRESS NOTES
CC: Positive Pregnancy Test    HISTORY OF PRESENT ILLNESS:    Gurdeep Harris is a 28 y.o. female, ,  Presents today for a routine exam complaining of oligomenorrhea and positive home urine pregnancy test.  Patient's last menstrual period was 2023.   She is not currently on any contraception. UPT is Positive.Pt recently had BBL surgery in [place] last month.   Pregnancy is not desired. Sexual Activity: single partner, contraception: coitus interruptus. Last period was normal.  She is not taking a PNV.  She denies nausea/vomiting. Current symptoms also include: breast tenderness. Denies vaginal bleeding and pelvic pain.    Denies PMH, abdominal surgeries, medications other than PNV, genetic family history (SC/CF). No personal history of DM, mother and sister with h/o DM. Father with HTN. No personal or family history of thyroid disease. No h/o HSV. Does not use alcohol, tobacco, or illicit drugs. Feels safe at home.     OB history:     Prior pregnancies:    - T -     - T-    Complications: GHTN, ANEMIA      Last Pap:  result NILM      ROS:  GENERAL: No weight changes. No swelling. No fatigue. No fever.  CARDIOVASCULAR: No chest pain. No shortness of breath. No leg cramps.   NEUROLOGICAL: No headaches. No vision changes.  BREASTS: No pain. No lumps. No discharge.  ABDOMEN: No pain. No diarrhea. No constipation.  REPRODUCTIVE: No abnormal bleeding.   VULVA: No pain. No lesions. No itching.  VAGINA: No relaxation. No itching. No odor. No discharge. No lesions.  URINARY: No incontinence. No nocturia. No frequency. No dysuria.    MEDICATIONS AND ALLERGIES:  Reviewed    COMPREHENSIVE GYN HISTORY:  PAP History: Denies abnormal Paps.  Infection History: Denies STDs. Denies PID.  Benign History: Denies uterine fibroids. Denies ovarian cysts. Denies endometriosis. Denies other conditions.  Cancer History: Denies cervical cancer. Denies uterine cancer or hyperplasia. Denies ovarian cancer.  Denies vulvar cancer or pre-cancer. Denies vaginal cancer or pre-cancer. Denies breast cancer. Denies colon cancer.  Sexual Activity History: Reports currently being sexually active  Menstrual History: None.  Contraception: None    /70   Wt 69 kg (152 lb 1.9 oz)   LMP 2023   BMI 29.71 kg/m²     PE:  AFFECT: Calm, alert and oriented X 3. Interactive during exam  GENERAL: Appears well-nourished, well-developed, in no acute distress.  HEAD: Normocephalic, atruamatic  TEETH: Good dentition.  SKIN: Normal for race, warm, & dry. No lesions or rashes.  LUNGS: Easy and unlabored  ABDOMEN: Soft and nontender without masses or organomegally.  VULVA: No lesions, masses or tenderness.  VAGINA: Moist and well rugated without lesions or discharge.  CERVIX: Moist and pink without lesions, discharge or tenderness.      UTERUS SIZE: 6 week size, nontender and without masses.  ADNEXA: No masses or tenderness.  EXTREMITIES: No cyanosis, clubbing or edema. No calf tenderness.  LYMPH NODES: No axillary or inguinal adenopathy.    PROCEDURES:  UPT Positive  Genprobe  Pap    ASSESSMENT/PLAN:  Amenorrhea  Positive urine pregnancy test (SALENA: , EGA: 6w0d based on LMP:)    -  Routine prenatal care    Nausea and vomiting in pregnancy    -  Education regarding lifestyle and dietary modifications    -  Advised use of B6/Unisom. Pt will notify us if no relief/worsening symptoms, will consider alternative therapies prn    1st TRIMESTER COUNSELING: Discussed all, booklet provided:  Common complaints of pregnancy  HIV and other routine prenatal tests including  genetic screening  Risk factors identified by prenatal history  Oriented to practice - discussed anticipated course of prenatal care & indications for Ultrasound  Childbirth classes/Hospital facilities   Nutrition and weight gain counseling  -- Discussed IOM recommended weight gain of:          Underweight        Less than 18.5            28-40            Normal  Weight     18.5-24.9                    25-35            Overweight          25-29.9                       15-25            Obese                  30 and greater             11-20    -- Discussed criteria for delivery at [facility] r/t excessive pre-preg weight or excessive weight gain:          Pre-pregnancy BMI over 40 or excess pregnancy weight gain defined as:          Pre-preg BMI < 18.5; Excess weight gain = > 60 pound          Pre-preg BMI 18.5-24.9;  Excess weight gain = > 53 pounds          Pre-preg BMI 25-29.9;  Excess weight gain = > 38 pounds          Pre-preg BMI > 30;  Excess weight gain = > 30 pounds  Toxoplasmosis precautions (Cats/Raw Meat)  Sexual activity and exercise  Environmental/Work hazards  Travel  Tobacco (Ask, Advise, Assess, Assist, and Arrange), as well as alcohol and drug use  Use of any medications (Including supplements, Vitamins, Herbs, or OTC Drugs)  Domestic violence  Seat belt use      TERATOLOGY COUNSELING:   Discussed indications and options for aneuploidy screening - pamphlets given  Dating US ORDERED  Indications for low-dose aspirin use after 12 weeks for the prevention of pre-eclampsia reviewed.  For this patient, her high risk factors include None. Her moderate risk factors include:  Sociodemographic characteristics: Black or  and h/o gHTN .  Patient is a candidate for low-dose aspirin and will begin taking it 12w  Anatomy US 19-20 weeks   Routine serum and urine prenatal labs today  FOLLOW-UP in 4 weeks with Dr. Ga Mcelroy PAJanuaryC    OB/GYN

## 2023-10-23 NOTE — PATIENT INSTRUCTIONS
AFTER TODAY'S VISIT -- GO TO THE LAB IN THE HOSPITAL (from our office, get on the elevator. Take it to the 1st floor, take a left then another left -- you will exit th medical office building and be facing the hospital. Go around to the left and you can enter through patient registration and tell them you are there for labs).     The Maternal Fetal Medicine Department (MFM) will call you soon to schedule your 1st ultrasound. This usually takes place when you are about 8 weeks pregnant.     Clinic Location:   [address] [place] [address]  [phone]    Your Pregnancy A to Z   Allergies - Seasonal   Benadryl   Claritin   Zyrtec   If you have a skin allergic reaction, call your doctor    Baby Movement Counts  Baby movement is an indicator of your baby's health and wellbeing. A movement may be a kick, stretch, or turn. You will begin counting baby movements after you reach 28 weeks gestation. Do these counts twice a day (AM and PM). Several things can affect your baby's activity, such as see (20-40 minutes time), your blood sugar levels, smoking, noise level, drugs, gestational age, placental location, decreasing space in the uterus, and time of the day.   Call your doctor if your baby has NOT had 5 movements in 1 hour or 10 movements in 2 hours or there is a significant decrease in your baby's movements.     Backache  Almost all pregnant women have backaches in pregnancy. These are often related to stretching of ligaments that hold the uterus in place. Backaches can also be cause by stretching of the back muscles that support the weight as your baby grows. Here are some comfort measures:   Maternity Belt   Warm heating pad   Warm bath   Regular strength Tylenol   Massage     Breastfeeding  Your doctor should discuss breastfeeding before delivery. It is the best nutrition for your baby. It also protects your baby from illnesses. Studies have shown that  babies get sick less often. When they reach school  age,  babies perform better in school. Newborns tolerate breast milk better than formula. It also decreases you bleeding after delivery by shrinking your uterus. It is a natural process, but often takes some time for you and your new infant to get the hang of things. Here are some helpful hints:   Try breastfeeding as soon as baby is born. Starting immediately increases the success of breastfeeding and creates a bond between you and your baby.   Do not get discouraged! Most women don't produce a significant amount of milk until days 3- after delivery. Continue taking your prenatal vitamins while breastfeeding. Stay well hydrated by drinking 8-10 glasses of water every day.   When breastfeeding, if you have fever, redness of breasts or fullness that isn't relieved by pumping or expressing your milk, contact your doctor. Breast feeding is not recommended for women with certain medical conditions.     Breast Tenderness   This is common in pregnancy, especially in the beginning. The tenderness is related to the hormone changes that occur in the 1st trimester. Your breasts become larger. You may also notice darkening of the nipples. As your pregnancy continues, it's not uncommon to product milk, even before you deliver. To relieve tenderness and heaviness, wear a good support bra.     Colds and Congestion   This can be normal. To relieve congestion, you may use ocean nasal spray, a saline nasal spray, or Sudafed (without PE) sparingly. Do not use antihistamines because they may make your congestion worse. You can also try using a humidifier.   Medications ok in pregnancy: Plain Robitussin, plain Sudafed, Actifed, Benadryl, Claritin, Mucinex, Zyrtec.   DO NOT use any DM medications.     Constipation   This is very common throughout all stages of pregnancy. It can be caused by hormones that relax the muscles in your digestive system. Iron often taken during pregnancy may make this worse. In addition, the growing  uterus presses on the lower intestines which may add to the problem. Here are some ways to relieve constipation:   Drink plenty of water (8-10 glasses a day)   Eat foods high in fiber such as raw fruits, vegetables, wholegrain breasts, and cereals.   Eat fruit, especially at night, such as prunes, figs, dates, raisins, peaches, and cherries because of their laxative properties.   Avoid cheese, bananas, and rice as they may slow down your bowel movements.   Use Metamucil or Citrucel as needed. You can also try Senekot, GoodSense Fiber, Miralax.  Try a stool softener such as Colace.  Minimal use of Milk of Magnesia, Lactulose, or Dulcolax   If you go 5 days without any form of a bowel movement or have significant pain, contact your doctor.     Contractions   Cramping or [place] Lorenzo contractions are common in the 2nd and 3rd trimesters. Make sure you stay well hydrated. You may also find comfort from a warm bath.   If you experience contractions every 5 minuets apart or closer for 2 hours, come to the hospital for evaluation.      Cough   For relief from cough, you can try regular/plain Robitussin, Chloraseptic spray, or any throat lozenges.     Cramping   Women commonly have abdominal cramping throughout their pregnancies. Cramping associated with pregnancy is often described as feeling similar to menstrual cramps. Early in the pregnancy, mild abdominal cramping is due to the growing uterus. Cramping can also be due to round ligament pain. In the late 2nd trimester and 3rd trimester, you may experience cramping due to [place] Lorenzo. These are contractions of the uterus but are not associated with labor. They can be worse with activity or when you are dehydrated. Make sure you drink 8-10 glasses of water every day. If the cramping becomes more intense or you experience the cramping every 5 minutes apart or closer for 2 hours, come to the hospital for evaluation.     Cues  Babies don't feed at regular intervals.  Instead, babies use cutes to tell you when they are hungry and full. You can tell when your baby is starting to get hungry when you see him or her stretch or begin to wake up. Ten, your baby may begin to bring hands to mouth, smack lips or stick tongue out hopefully you have started to feed by now. If not, baby may start to cry, which makes feeding very difficult. When mothers respond to feeding cues, baby eats more frequently. More frequent feedings increase moms milk supply. More milk means that baby will be happier and healthier, and mom will be more confident.     Dental Procedures  Bleeding gums are common during pregnancy. However, if you have painful gums or teeth, speak with your dentist.   Most dental procedures can be done safely in pregnancy with a local anesthetic.   Keep your teeth healthy during pregnancy by brushing twice a day, using dental floss, and having regular dental exams and cleanings.     Diarrhea  Your gastrointestinal tract may be more sensitive during pregnancy. That sensitivity can cause diarrhea after you eat certain foods.   Relieve diarrhea with Imodium or Kaopectate. Stay well hydrated. If you have blood or mucus in your stool, call your doctor.     Diet   Maintain a healthy diet throughout pregnancy by eating grains, fruits, vegetables, dairy products, meats, and beans. Avoid fatty greasy, and fried foods. Eat small, frequent meals throughout the day and NEVER skip breakfast. There are some foods you want to avoid during pregnancy:   Cheese such as brie, Gouda, and feta. These soft cheeses are not completely pasteurized and contain bacteria that can be harmful to your baby.   Shark, swordfish, óscar mackerel, and tilefish can be high in mercury. Limit canned tuna and salmon to once per week.   Packaged meat such as ham, bologna, and hot dogs. They contain bacteria that can be harmful. Eat them only if they are fully cooked. Raw meat and raw fish.   More information on foods  below    Dizziness/Faintness  This is common during pregnancy when standing for long periods of time. You may also experience this when changing positions, such as moving from sitting to standing. This usually occurs in the 2nd trimester. Cole sure you drink plenty of water and avoid standing for longer periods of time. If it does not improve, contact your doctor.     Exclusive Breastfeeding  Mothers milk has everything a baby needs for the first six months of life. It make take time to learn to breastfeed, but you have the support you need to be successful. All major health organizations recommend excusive breastfeeding for 6 months. This means no water, juice, tea, rice cereal, or solids for baby until after six months.     Exercise and Physical cavity   You can and should exercise during pregnancy but do not start a new rigorous activity. When you are exercising, your heart rate should not exceed 140 eats per minute. Do not exercise for more than 15 minutes in areas that are hot, humid, or not well ventilated. After the 4th month of pregnancy, avoid exercises that require you to lie on your back. Avoid exercises that will cause trauma to your abdomen, such as horseback riding, downhill skiing, wrestling, etc. swimming is permissible, but diving is not. If you experience excessive contractions, bleeding, loss of fluid, or decreased fetal movement, come to the hospital immediately.     Frequent Urination   Hormone changes at the beginning of pregnancy increase the frequency of urination. This is normal. Pressure from the uterus and the baby at the end of pregnancy decreases the capacity of the bladder - also leading to frequent urination. Because of the increased pressure, it's not uncommon to lose urine unexpectedly.     Gas and Bloating  It is common to have gas and bloating during pregnancy. Here are things to do to help:   Recognize the foods that give you gas and avoid eating them  Eat small, frequent meals  instead of big, heavy meals   Don't eat fired, fatty, and greasy foods  Avoid constipation     Hair  Often, the hormonal changes during pregnancy cause your hair to break. You may also notice increased shedding during the post-partum period. These changes are normal.   Relaxer, perms, and hair dyes can be applied after the first trimester.     Headaches   There are different causes for headaches during pregnancy:   Tension Headaches: pain usually in the back and sides of the head that becomes worse with stress. These are best treated by taking regular strength Tylenol, drinking plenty of water, and resting.   Sinus Headaches: Pain under eyes or around the face. Best relieved with regular strength Tylenol, alternating cold and warm compresses, or a humidifier.   Migraine Headaches: Often accompanied with nausea or vomiting. Light and sound make migraine pain worse. Tylenol may help with this pain. If you experience this, consult your doctor.   Tips to help with headaches:   Magnesium oxide 400 mg nightly  Check your Prenatal Vitamin and ensure you it has at least 400 mg of Vitamin B2. If it does not get OTC Vitamin B2 as well.    Riboflavin 400 mg daily  Caffeine (during the day, <200 mg daily)  Benadryl or OTC Melatonin 1-2 mg nightly (before bedtime).   2 extra strength Tylenol or 1-2 tablets (do not exceed 3000 mg tylenol daily)  Gatorade  Migraine frequency and severity should start to level off and improve during the second trimester as estrogen levels normalize.  Please contact me in 1-2 weeks to update me on any changes for better or worse.  Any time you experience a headache associated with blurry vision or a headache that's not relieved with Tylenol, contact your doctor. This may be cause by elevated blood pressure.     Heartburn   Pregnancy hormonal changes slow down your digestive system and the stomach takes longer to empty. This increases the production of gastric juices that can lead to heartburn. Hear  are some ways to ease the pain:   Eat small, more frequent meals  Avoid spicy foods  Avoid fried, fatty, spicy food   Avoid irritants such as citrus juices, tomatoes, and sodas  Avoid alcohol, mini, coffee, and strong tea   Remain upright for at least one hour after eating   Medications: Tums, Rolaids, carafate, Mylanta with Simethicone, Gas-X. Pepcid AC, Prilosec OTC 20mg, Prevacid, Protonix.   Take over the counter Pepcid twice a day      Heart Palpitations  During pregnancy, you may feel as though your heart is racing or skipping a beat. These can be normal but if associated with chest pain, shortness of breath, or fatigue, contact your doctor immediately.     Insomnia  Benadryl 25mg, Tylenol PM, Unisom     Hemorrhoids  As your pregnancy continues and you have more pelvic pressure, you may develop hemorrhoids. These can be painful. Here are tips to help with the pain:   Avoid constipation (see above).   Use hemorrhoid creams such as Annusol or Preparation H.   Use astringents such as Tucks Medicated Pads  If your pain persists or your experience excessive bleeding, contact your doctor.     Latch   When it comes to latching on for breastfeeding, only 2 things matter: comfort and effectiveness. Breastfeeding is part instinct, and part learning. Our staff will help the learning to make sure there is no pain and baby is getting milk     Leg Cramps   Muscle spasms in the calf, especially at night, are common during pregnancy. Try massaging the calves, stretching or applying a warm heating pad. If your leg cramps do not improve or only occur in one leg, go to the hospital.     Miscarriage   Unfortunately, this is the unhappy side of pregnancy and is very common. This is not your fault or your partner's fault. Most of the time, miscarriage is the results of genetic information not coming together in the right way. It will not affect your next pregnancy. However, if you have had 3 or more miscarriages, discuss this with  your doctors. Also, you should be aware of the signs of a miscarriage:   Bleeding during pregnancy is a result of increased dilation of blood vessels. However, if you have bleeding that soaks through a sanitary pad, contact your doctor.   Cramping can be a sign of growth. However, if it is associated with bleeding, contact your doctor.   Pregnancy loss is a difficult life event. If you are having difficult coping, speak to your doctor or nurse about support groups or counselors.     Mood Swings  You may be happy one minute and sad the next. Mood swings are a normal part of pregnancy and caused by hormones. However, if you are extremely sad, cry a lot, cannot sleep, are not eating or feel like hurting yourself or someone else, call your doctor immediately.     Nausea and Vomiting  During the early stages of pregnancy, these symptoms are common. These usually stop in the 2nd trimester. Here are some things you can do to reduce feeling nauseous.   Diet Modifications: Eat before or as soon as you feel hungry to avoid an empty stomach, which can aggravate nausea. Eat crackers or dry toast before sitting up in the morning. A snack before getting out of bed in the morning and snacks during the night may be helpful. Eat meals slowly and in small amounts every 1-2 hours to avoid an overly full stomach. Eliminate coffee and spicy, odorous, high-fat, acidic, or very sweet foods, and instead eat protein-dominant, salty, low-fat, bland, and/or dry meals. These include things like nuts, pretzels, crackers, cereal, toast. Drink fluids 30 minutes before or after food. Try drinking cold, clear, carbonated, or sour fluids in small amounts.    Avoid sudden movements. Get out of bed slowly.   Stay hydrated. If you cannot tolerate water, try Sprite.   Try ginger in any form: ginger ale, ginger snaps, or ginger tablets.   The only category A medication for nausea in pregnancy is doxylamine, but insurance coverage for the brand medication  is still poor. It is available over-the-counter in a form of Unisom 25 mg.  The other component of Diclegis is Vitamin B6 (also known as pyridoxine), also available over-the-counter.  You could take 1/2 tablet of the Unisom in the morning and a full tablet in the evening (to minimize sleepiness during the day).  Take 25 mg of the pyridoxine every 6-8 hours. You can also try Emetrol.   If your vomiting persists for more than 24 hours, check with your doctor for further advice.     Nosebleeds  Because of increased dilation of the blood vessels during pregnancy, nosebleeds can occur. This condition does not usually require medical treatment. However, if the bleeding persists, contact your doctor.     Pain  Labor is hard work. When you're making decisions about how to hand birth pain, talk with your nurses and doctors about techniques that will not interfere with breastfeeding. For example, select a support person to be with you during labor, and practice ways of moving and massaging that help you feel comfortable.     Preeclampsia   This is a disorder during pregnancy in which your blood pressure goes up above limits that are normal for you. This can be dangerous if not monitored. Possible complications are seizure, stroke, placental abruption, pulmonary edema, kidney failure, and baby's death. Notify your doctor if you have:   Sudden weight gain of 5 or more pounds in one week   Generalized swelling that appears quickly   Decreased urine   Headaches that don't go away with Tylenol  Chest pain  Shortness of breath   Vision changes   If you have these symptoms with a blood pressure of >140/90 or you do not have these symptoms, but your blood pressure is >160/100 go to labor and delivery if you are >20 weeks.     Prenatal Visits  Prenatal care is essential to having a happy, healthy pregnancy. During your visits, your provider will listen to the baby's heart (after 12 weeks gestation) and make sure your baby is growing  appropriately. You will have different laboratory tests performed, including your blood type, checking for anemia, and testing for infections. You will need to see a provider every 4 weeks until you are 28 weeks pregnant. Then you will see your provider every 2 weeks until you are 35 weeks pregnant. After 35 weeks, you will be seen every week until you deliver.      Labor   labor occurs when regular contractions begin to open our cervix before 37 weeks of pregnancy. A full-term pregnancy should last about 40 weeks. If  labor can't be stopped, your baby will be born early.  The good news is that doctors can do a lot to delay an early delivery. The longer your baby gets to grow inside you, the less likely he or she is to have problems after birth. Here are some risks for  birth:   Pervious  labor and delivery  Abnormally shaped uterus or uterine surgery   Two or more second trimester miscarriages or abortions   Incompetent cervix, cone biopsy, large fibroid   Current pregnancy with twins, triplets, etc.   Severe urinary tract infection or kidney infection   Vaginal bleeding, placenta previa   Too much or too little amniotic fluid     Rooming In   This is when mom and baby are together in the same room throughout their whole stay. The doctors and nurses provide all clinical care in the room, except for some procedures that need to e done in another unit. Babies feel safe and secure when they are near the people who love them. Mother and babies sleep better quality when rooming in. nurses are more available to be with you and your baby in your room because they are not busy taking care of a nursery full of babies. They will help you with feeding, diapering, bathing, etc. This way, when you go home, you will feel confident.     Round Ligament Pain   There are 2 ligaments (a band of tough, flexible, fibrous connective tissue) from the front of the uterus an end in the vagina. These are  the round ligaments.as the uterus grows and stretches, these ligaments stretch. Pain is often associated with this stretching. It can be sharp, stabbing pain usually in the lower pelvis or vagina. This pain is worse when you move from sitting to standing or walk for long periods. You can help this pain with the following:   Using a warm (not hot) heating pad  Warm bath   Regular strength Tylenol   Maternity belt   Staying well hydrated.     Salivation and spitting   Some women experience increased salivation during pregnancy. This is known as ptyalism. Decrease your saliva by using non-mediated throat lozenges, sucking candy such as peppermint or eating crackers. This may stop in 2nd trimester but may continue throughout pregnancy.     Sexual intercourse  In most cases, it is safe to continue sexual intercourse throughout pregnancy. You may find a decreased or increase desire during pregnancy, and this is normal. Avoid sex if you are having bleeding, your water bag is ruptured, or you have been diagnosed with placenta previa or an incompetent cervix.     Shortness of Breath  Toward the end of pregnancy, many women experience shortness of breath. The uterus is getting bigger, and the diaphragm is unable to lower, making it feel like you are unable to catch your breath. However, if you experience wheezing, inability to catch your breath, dizziness or your blood pressure is elevated, call your doctor.     Skin Changes  Hormone changes in pregnancy affect the melanocytes and cause darkening of several areas of the body. Some women's faces darken causing a pregnancy mask. Some have darkening around the areola around the nipple. Often a dark line appears on the abdomen from the belly button to the pubic area. Stretch marks may also form. Most of the skin changes will fade during pregnancy. You may minimize the appearance of stretch marks by using cocoa butter lotion, vitamin E oil and other over the counter products.      Skin to Skin   The first few hours after birth are sacred. As soon as your baby is born, she or he will be dried ad placed on you for the first hug! As long as everyone is healthy, you will get to be skin to skin through your first feeding and for at least an hour. Baby has been growing inside of you and really needs to stay close after birth in order to adjust to life in the outside world. Feeling your warmth, hearing your heartbeat and voice, and receiving your milk will all ensure a smooth transition. Other loved ones will enjoy holding the baby after this important adjustment period.     Support  We are here for you. With the right support, al mother can breastfeed. Our team of doctors, nurses, and lactation consultants can help you met your goals. Our job is to help you make informed decisions and to make sure you have the support you need to meet your goals.     Swelling  As the uterus gets larger, it lies on the inferior vena cava, diminishing the return of blood flow. This often leads to swelling in your ankles and feet, and sometimes in your hands. Swelling in your hands may lead to a condition known as carpal tunnel syndrome, resulting in pain in your wrists. You can improve swelling with these techniques:   Avoid salty foods  Elevate your feet high than the level of your heart  Avoid standing for long periods  Wear loose clothes  Wear wrist braces, especially at night, for carpal tunnel  If you experience swelling with blood pressure >140/90 or with other signs of preeclampsia, go to the hospital     Tiredness  Fatigue during pregnancy can be normal. It's most pronounced at the start and end of pregnancy. Make sure to get adequate sleep and rest. While most of the time this is normal, it can be a sign of anemia. You are screened for this routinely during your pregnancy.     Travel  Travel during pregnancy is safe. However, you should always check with your doctor first before traveling. During  pregnancy, you are at increased risk for blood clots, so you should walk around every 1-2 hours during travel. Always wear a seatbelt when riding in the car. Place the shoulder strap across your chest and place the waist belt underneath your belly. You should avoid flying after 35 weeks.     True Labor vs False Labor  It's important to e able to tell the difference between true and false labor. When labor begins, you have regular contractions every 5 minutes for 2 hours. The contractions get stronger (average is about 1 minute) in intensity and last longer. The cervix starts to dilate or open. A bloody show or pink discharge may occur. A sudden gush or leaking of watery fluid from the vagina may be because your water bag has broken.   When you are having false labor, the cervix does not dilate. Contractions are not in a regular pattern. They do not get strong in intensity and changing your activity, such as walking or lying down, may make the pain of contractions lessen. Staying hydrated with plenty of water and or taking a dose of Tylenol makes contractions decrease.     Urinary Tract Infection   Urinary tract infections can cause  labor. Here are some methods to help prevent getting a urinary tract infection:   Drink 8-10 glasses of water a day   Drink cranberry juice every day as it lowers the pH or the urinary tract and discourages bacterial growth   Empty your bladder immediately before and after sexual intercourse  Wipe from front to back after using the toilet  Avoid irritating bubble baths and soaps.   Wear cotton underwear.   If you experience burning when you urinate, blood in your urine, fever, chills, or pain associated with urination, tell your doctor.     Vaginal Bleeding and Discharge  Hormonal changes during pregnancy can cause vaginal discharge or varying consistencies. You may have a thick white discharge from your vagina that helps prepare your body for birth. You may have vaginal bleeding  for different reasons such as broken blood vessels in your cervix after a vaginal exam. A vaginal infection may cause bleeding. Also, having sex may cause some of the blood vessels to break and result in some spotting. If you have bleeding like a menstrual period, itching, irritation, or foul odor, call your doctor.     Varicose Veins  Varicose veins occur because of dilation of the blood vessels during pregnancy. Varicose veins may occur on the legs or even the vulva. Avoid standing for long periods of time, elevate your feet at night, and wear support hose during the day.     Weight Gain  While you should not diet during your pregnancy, there is an expected amount of weight you should gain during your pregnancy based on your BMI:   BMI  < 18.5 -  28-40 lbs.  18.5-24.9 -  25-35 lbs.  25-29.9 -  15-25 lbs.  >30 -   11-20 lbs.    Yeast Infections   In pregnancy, we would recommend any of the following (all are over the counter) - so you will not need a prescription for these medications. These products can be used at any point during pregnancy and don't pose a risk of birth defects or other pregnancy complications. For best results, choose a seven-day formula.   Clotrimazole (Mycelex, Lotrimin AF)  Miconazole (Monistat)  Terconazole    WHAT TO EXPECT AT CERTAINS WEEKS FOR VISITS/ULTRASOUNDS:     6-10 WEEKS  Schedule a transvaginal ultrasound to be done at a later date   This will be scheduled as a separate visit by our [facility] department. This is usually scheduled between 8-12 weeks to confirm your dating.   Discuss prenatal vitamins  Order labs such as CBC, Rh type, Syphilis, Hepatitis, HIV, Rubella titers   Gonorrhea and Chlamydia test   Pap smear if due   Get medical history and previous OB history   Monthly visits until 28 week visit     12 weeks   Discuss prenatal lab testing  Check weight, blood pressure, and urine  Listen for fetal heart tones  Sign up for patient portal and connected mom   MaternTI 21 (NIPT)  testing - optional     15-18 weeks  Listen for fetal heart tones and check uterine size  Order quad screen/maternal integrated screening if wanted   Check weight, blood pressure, urine  Maternal Quad Screen for down syndrome screen   Anatomy scan at 18-20 weeks     22-24 weeks  Glucose testing     28-30 weeks    Check weight, blood pressure, and urine  Listen for fetal heart tones  Tdap vaccine   Rhogam vaccine if - Rh.   Prenatal visits every 2 weeks until 36 weeks   Nonstress tests if necessary     32-36 weeks   Check weight, blood pressure, and urine  Listen for fetal heart tones  Group B strep culture of vagina     37-40 weeks   Check weight, blood pressure, and urine  Listen for fetal heart tones  Discuss labor and delivery   Cervical examination     NUTRITION IN PREGNANCY   What is folic acid and how much do I need daily?   Folic acid, also known as folate, is a B vitamin that is important for pregnant women. Before pregnancy and during pregnancy, you need 400 micrograms of folic acid daily to help prevent major birth defects of the fetal brain and spine called neural tube defects. Current dietary guidelines recommend that pregnant women get at least 600 micrograms of folic acid daily from all sources. It may be hard to get the recommended amount of folic acid from food alone. For this reason, all pregnant women and all women who may become pregnant should take a daily vitamin supplement that contains folic acid.    Why is iron important during pregnancy and how much do I need daily?  Iron is used by your body to make a substance in red blood cells that carries oxygen to your organs and tissues. During pregnancy, you need extra iron--about double the amount that a nonpregnant woman needs. This extra iron helps your body make more blood to supply oxygen to your fetus. The daily recommended dose of iron during pregnancy is 27 mg, which is found in most prenatal vitamin supplements. You also can eat iron-rich  foods, including lean red meat, poultry, fish, dried beans and peas, iron-fortified cereals, and prune juice. Iron also can be absorbed more easily if iron-rich foods are eaten with vitamin C-rich foods, such as citrus fruits and tomatoes.    Why is calcium important during pregnancy and how much do I need daily?  Calcium is used to build your fetus's bones and teeth. All women, including pregnant women, aged 19 years and older should get 1,000 mg of calcium daily; those aged 14-18 years should get 1,300 mg daily. Milk and other dairy products, such as cheese and yogurt, are the best sources of calcium. If you have trouble digesting milk products, you can get calcium from other sources, such as broccoli; dark, leafy greens; sardines; or a calcium supplement.    Why is vitamin D important during pregnancy and how much do I need daily?  Vitamin D works with calcium to help the fetus's bones and teeth develop. It also is essential for healthy skin and eyesight. All women, including those who are pregnant, need 600 international units of vitamin D a day. Good sources are milk fortified with vitamin D and fatty fish such as salmon. Exposure to sunlight also converts a chemical in the skin to vitamin D.    Can being overweight or obese affect my pregnancy?  Overweight and obese women are at an increased risk of several pregnancy problems. These problems include gestational diabetes, high blood pressure, preeclampsia,  birth, and  delivery. Babies of overweight and obese women also are at greater risk of certain problems, such as birth defects, macrosomia with possible birth injury, and childhood obesity.    Can caffeine in my diet affect my pregnancy?  Although there have been many studies on whether caffeine increases the risk of miscarriage, the results are unclear. Most experts state that consuming fewer than 200 mg of caffeine (one 12-ounce cup of coffee) a day during pregnancy is safe.  Food and  Beverages Milligrams of Caffeine (Average)   Coffee (8 oz)    Brewed, drip  137   Instant 76   Tea (8oz)     Brewed 48   Instant 26-36   Caffeinated soft drinks (12 oz)  37   Hot cocoa (12 oz) 8-12   Chocolate milk (8 oz)  5-8   Candy    Dark chocolate (1.45 oz) 30   Milk chocolate (1.55 oz)  11   Semi-sweet chocolate (1/4 cup)  26-28   Chocolate syrup (1 tbsp.) 3   Coffee ice cream or frozen yogurt (1/2 cup) 2     How much water should I drink during pregnancy?   During pregnancy, adequate fluid intake from consumption of beverages (water and other liquids) is estimated to be approximately 2.3 L/day (76 fluid ounces or approximately 10 cups). Additional water is consumed in foods other than beverages to meet the total adequate intake of 3 L/day.    What are the benefits of including fish and shellfish in my diet during pregnancy?  Omega-3 fatty acids are a type of fat found naturally in many kinds of fish. They may be important factors in your fetus's brain development both before and after birth. To get the most benefits from omega-3 fatty acids, women should eat at least two servings of fish or shellfish (about 8-12 ounces) per week before getting pregnant, while pregnant, and while breastfeeding.    What should I know about eating fish during pregnancy?  Some types of fish have higher levels of a metal called mercury than others. Mercury has been linked to birth defects. To limit your exposure to mercury, follow a few simple guidelines. Choose fish and shellfish such as shrimp, salmon, catfish, and pollock. Do not eat shark, swordfish, óscar mackerel, huerta, orange roughy, or tilefish. Limit white (albacore) tuna to 6 ounces a week. You also should check advisories about fish caught in local [place].    How can food poisoning affect my pregnancy?  Food poisoning in a pregnant woman can cause serious problems for both her and her fetus. Vomiting and diarrhea can cause your body to lose too much water and can  disrupt your body's chemical balance. To prevent food poisoning, follow these general guidelines:    Wash food. Rinse all raw produce thoroughly under running tap water before eating, cutting, or cooking.  Keep your kitchen clean. Wash your hands, knives, countertops, and cutting boards after handling and preparing uncooked foods.  Avoid all raw and undercooked seafood, eggs, and meat. Do not eat sushi made with raw fish (cooked sushi is safe). Food such as beef, pork, or poultry should be cooked to a safe internal temperature.    What is listeriosis and how can it affect my pregnancy?  Listeriosis is a type of food-borne illness caused by bacteria. Pregnant women are 13 times more likely to get listeriosis than the general population. Listeriosis can cause mild, flu-like symptoms such as fever, muscle aches, and diarrhea, but it also may not cause any symptoms. Listeriosis can lead to miscarriage, stillbirth, and premature delivery. Antibiotics can be given to treat the infection and to protect your fetus. To help prevent listeriosis, avoid eating the following foods during pregnancy:  Unpasteurized milk and foods made with unpasteurized milk  Hot dogs, luncheon meats, and cold cuts unless they are heated until steaming hot just before serving  Refrigerated conroy and meat spreads  Refrigerated smoked seafood  Raw and undercooked seafood, eggs, and meat

## 2023-10-24 LAB
ESTIMATED AVG GLUCOSE: 97 MG/DL (ref 68–131)
HBA1C MFR BLD: 5 % (ref 4–5.6)
HBV SURFACE AG SERPL QL IA: NORMAL
HCV AB SERPL QL IA: NORMAL
HIV 1+2 AB+HIV1 P24 AG SERPL QL IA: NORMAL

## 2023-10-25 ENCOUNTER — PATIENT MESSAGE (OUTPATIENT)
Dept: OBSTETRICS AND GYNECOLOGY | Facility: CLINIC | Age: 28
End: 2023-10-25
Payer: MEDICAID

## 2023-10-25 ENCOUNTER — TELEPHONE (OUTPATIENT)
Dept: MATERNAL FETAL MEDICINE | Facility: CLINIC | Age: 28
End: 2023-10-25
Payer: MEDICAID

## 2023-10-25 ENCOUNTER — PATIENT MESSAGE (OUTPATIENT)
Dept: MATERNAL FETAL MEDICINE | Facility: CLINIC | Age: 28
End: 2023-10-25
Payer: MEDICAID

## 2023-10-25 LAB
BACTERIA UR CULT: NORMAL
C TRACH DNA SPEC QL NAA+PROBE: NOT DETECTED
N GONORRHOEA DNA SPEC QL NAA+PROBE: NOT DETECTED
RPR SER QL: NORMAL
RUBV IGG SER-ACNC: 37 IU/ML
RUBV IGG SER-IMP: REACTIVE

## 2023-11-07 ENCOUNTER — TELEPHONE (OUTPATIENT)
Dept: OBSTETRICS AND GYNECOLOGY | Facility: CLINIC | Age: 28
End: 2023-11-07
Payer: MEDICAID

## 2023-11-07 NOTE — TELEPHONE ENCOUNTER
----- Message from Frances Godoy sent at 11/7/2023  2:44 PM CST -----  Regarding: pt called  Type: Patient Call Back         Who called: JAZZ MENDOZA [[id number]]         What is the request in detail: Pt states she needs a light duty note for her job at the [employer] due to having to stand for long periods of time, lift and bend. Please advise          Can the clinic reply by [facility]? No         Would the patient rather a call back or a response via [facility]? Call back         Best call back number:     [phone] best time to call is tomorrow or job number [phone]       Thank you.

## 2023-11-09 ENCOUNTER — PROCEDURE VISIT (OUTPATIENT)
Dept: MATERNAL FETAL MEDICINE | Facility: CLINIC | Age: 28
End: 2023-11-09
Payer: MEDICAID

## 2023-11-09 DIAGNOSIS — N91.4 SECONDARY OLIGOMENORRHEA: ICD-10-CM

## 2023-11-09 DIAGNOSIS — Z36.89 ENCOUNTER FOR FETAL ANATOMIC SURVEY: Primary | ICD-10-CM

## 2023-11-09 PROCEDURE — 76801 OB US < 14 WKS SINGLE FETUS: CPT | Mod: PBBFAC | Performed by: OBSTETRICS & GYNECOLOGY

## 2023-11-09 PROCEDURE — 76801 US OB/GYN EXTENDED PROCEDURE (VIEWPOINT): ICD-10-PCS | Mod: 26,S$PBB,, | Performed by: OBSTETRICS & GYNECOLOGY

## 2023-11-17 ENCOUNTER — OFFICE VISIT (OUTPATIENT)
Dept: OBSTETRICS AND GYNECOLOGY | Facility: CLINIC | Age: 28
End: 2023-11-17
Payer: MEDICAID

## 2023-11-17 VITALS
DIASTOLIC BLOOD PRESSURE: 80 MMHG | SYSTOLIC BLOOD PRESSURE: 110 MMHG | WEIGHT: 153.44 LBS | BODY MASS INDEX: 29.97 KG/M2

## 2023-11-17 DIAGNOSIS — O09.291 HISTORY OF PRE-ECLAMPSIA IN PRIOR PREGNANCY, CURRENTLY PREGNANT IN FIRST TRIMESTER: ICD-10-CM

## 2023-11-17 PROCEDURE — 99999 PR PBB SHADOW E&M-EST. PATIENT-LVL II: CPT | Mod: PBBFAC,,, | Performed by: OBSTETRICS & GYNECOLOGY

## 2023-11-17 PROCEDURE — 3079F DIAST BP 80-89 MM HG: CPT | Mod: CPTII,,, | Performed by: OBSTETRICS & GYNECOLOGY

## 2023-11-17 PROCEDURE — 99213 PR OFFICE/OUTPT VISIT, EST, LEVL III, 20-29 MIN: ICD-10-PCS | Mod: TH,S$PBB,, | Performed by: OBSTETRICS & GYNECOLOGY

## 2023-11-17 PROCEDURE — 99999 PR PBB SHADOW E&M-EST. PATIENT-LVL II: ICD-10-PCS | Mod: PBBFAC,,, | Performed by: OBSTETRICS & GYNECOLOGY

## 2023-11-17 PROCEDURE — 1159F PR MEDICATION LIST DOCUMENTED IN MEDICAL RECORD: ICD-10-PCS | Mod: CPTII,,, | Performed by: OBSTETRICS & GYNECOLOGY

## 2023-11-17 PROCEDURE — 1159F MED LIST DOCD IN RCRD: CPT | Mod: CPTII,,, | Performed by: OBSTETRICS & GYNECOLOGY

## 2023-11-17 PROCEDURE — 99213 OFFICE O/P EST LOW 20 MIN: CPT | Mod: TH,S$PBB,, | Performed by: OBSTETRICS & GYNECOLOGY

## 2023-11-17 PROCEDURE — 3044F HG A1C LEVEL LT 7.0%: CPT | Mod: CPTII,,, | Performed by: OBSTETRICS & GYNECOLOGY

## 2023-11-17 PROCEDURE — 99212 OFFICE O/P EST SF 10 MIN: CPT | Mod: PBBFAC,TH | Performed by: OBSTETRICS & GYNECOLOGY

## 2023-11-17 PROCEDURE — 3074F PR MOST RECENT SYSTOLIC BLOOD PRESSURE < 130 MM HG: ICD-10-PCS | Mod: CPTII,,, | Performed by: OBSTETRICS & GYNECOLOGY

## 2023-11-17 PROCEDURE — 3044F PR MOST RECENT HEMOGLOBIN A1C LEVEL <7.0%: ICD-10-PCS | Mod: CPTII,,, | Performed by: OBSTETRICS & GYNECOLOGY

## 2023-11-17 PROCEDURE — 3074F SYST BP LT 130 MM HG: CPT | Mod: CPTII,,, | Performed by: OBSTETRICS & GYNECOLOGY

## 2023-11-17 PROCEDURE — 3079F PR MOST RECENT DIASTOLIC BLOOD PRESSURE 80-89 MM HG: ICD-10-PCS | Mod: CPTII,,, | Performed by: OBSTETRICS & GYNECOLOGY

## 2023-11-17 PROCEDURE — 3008F PR BODY MASS INDEX (BMI) DOCUMENTED: ICD-10-PCS | Mod: CPTII,,, | Performed by: OBSTETRICS & GYNECOLOGY

## 2023-11-17 PROCEDURE — 3008F BODY MASS INDEX DOCD: CPT | Mod: CPTII,,, | Performed by: OBSTETRICS & GYNECOLOGY

## 2023-11-17 NOTE — PROGRESS NOTES
9w2d. Pt reports no complaints.  Denies contractions, vaginal bleeding, or leakage of fluid.   Trisomy screening:MaterniT 21 ordered  F/U 4 weeks. Next visit: routine care     1. History of pre-eclampsia in prior pregnancy, currently pregnant in first trimester  - PNV,calcium 72-iron-folic acid (PRENATAL VITAMIN PLUS LOW IRON) 27 mg iron- 1 mg Tab; Take one tablet daily.  Prescribe prenatal covered by insurance  Dispense: 90 tablet; Refill: 11     - Start Baby ASA at 12 wks.    Total time spent on visit was 20 minutes.  This includes face to face time and non-face to face time preparing to see the patient (eg, review of tests), Obtaining and/or reviewing separately obtained history, Documenting clinical information in the electronic or other health record, Independently interpreting resultsand communicating results to the patient/family/caregiver, or Care coordination.

## 2023-11-22 ENCOUNTER — PATIENT MESSAGE (OUTPATIENT)
Dept: OBSTETRICS AND GYNECOLOGY | Facility: CLINIC | Age: 28
End: 2023-11-22
Payer: MEDICAID

## 2023-11-22 DIAGNOSIS — O09.291 HISTORY OF PRE-ECLAMPSIA IN PRIOR PREGNANCY, CURRENTLY PREGNANT IN FIRST TRIMESTER: ICD-10-CM

## 2023-12-04 ENCOUNTER — PATIENT MESSAGE (OUTPATIENT)
Dept: MATERNAL FETAL MEDICINE | Facility: CLINIC | Age: 28
End: 2023-12-04
Payer: MEDICAID

## 2023-12-07 ENCOUNTER — PATIENT MESSAGE (OUTPATIENT)
Dept: OBSTETRICS AND GYNECOLOGY | Facility: CLINIC | Age: 28
End: 2023-12-07
Payer: MEDICAID

## 2023-12-13 ENCOUNTER — ROUTINE PRENATAL (OUTPATIENT)
Dept: OBSTETRICS AND GYNECOLOGY | Facility: CLINIC | Age: 28
End: 2023-12-13
Payer: MEDICAID

## 2023-12-13 VITALS
BODY MASS INDEX: 30.44 KG/M2 | WEIGHT: 155.88 LBS | DIASTOLIC BLOOD PRESSURE: 70 MMHG | SYSTOLIC BLOOD PRESSURE: 120 MMHG

## 2023-12-13 DIAGNOSIS — O09.292 HISTORY OF PRE-ECLAMPSIA IN PRIOR PREGNANCY, CURRENTLY PREGNANT IN SECOND TRIMESTER: Primary | ICD-10-CM

## 2023-12-13 DIAGNOSIS — O21.9 NAUSEA AND VOMITING DURING PREGNANCY PRIOR TO 22 WEEKS GESTATION: ICD-10-CM

## 2023-12-13 DIAGNOSIS — Z3A.13 13 WEEKS GESTATION OF PREGNANCY: ICD-10-CM

## 2023-12-13 PROCEDURE — 81514 NFCT DS BV&VAGINITIS DNA ALG: CPT | Performed by: OBSTETRICS & GYNECOLOGY

## 2023-12-13 PROCEDURE — 99999 PR PBB SHADOW E&M-EST. PATIENT-LVL II: CPT | Mod: PBBFAC,,, | Performed by: OBSTETRICS & GYNECOLOGY

## 2023-12-13 PROCEDURE — 99999 PR PBB SHADOW E&M-EST. PATIENT-LVL II: ICD-10-PCS | Mod: PBBFAC,,, | Performed by: OBSTETRICS & GYNECOLOGY

## 2023-12-13 PROCEDURE — 99213 OFFICE O/P EST LOW 20 MIN: CPT | Mod: TH,S$PBB,, | Performed by: OBSTETRICS & GYNECOLOGY

## 2023-12-13 PROCEDURE — 99213 PR OFFICE/OUTPT VISIT, EST, LEVL III, 20-29 MIN: ICD-10-PCS | Mod: TH,S$PBB,, | Performed by: OBSTETRICS & GYNECOLOGY

## 2023-12-13 PROCEDURE — 99212 OFFICE O/P EST SF 10 MIN: CPT | Mod: PBBFAC,TH | Performed by: OBSTETRICS & GYNECOLOGY

## 2023-12-13 RX ORDER — FAMOTIDINE 20 MG/1
20 TABLET, FILM COATED ORAL 2 TIMES DAILY
Qty: 60 TABLET | Refills: 4 | Status: SHIPPED | OUTPATIENT
Start: 2023-12-13 | End: 2024-12-12

## 2023-12-13 RX ORDER — ONDANSETRON 4 MG/1
4 TABLET, FILM COATED ORAL EVERY 6 HOURS PRN
Qty: 30 TABLET | Refills: 4 | Status: SHIPPED | OUTPATIENT
Start: 2023-12-13 | End: 2024-12-12

## 2023-12-13 RX ORDER — ASPIRIN 81 MG/1
81 TABLET ORAL DAILY
Refills: 0 | COMMUNITY
Start: 2023-12-13 | End: 2024-09-18

## 2023-12-13 NOTE — PROGRESS NOTES
13w0d . Pt reports no complaints.  Denies contractions, vaginal bleeding, or leakage of fluid.   Trisomy screening:MaterniT 21 normal  F/U 4 weeks. Next visit: routine care     1. History of pre-eclampsia in prior pregnancy, currently pregnant in second trimester  - PNV,calcium 72-iron-folic acid (PRENATAL VITAMIN PLUS LOW IRON) 27 mg iron- 1 mg Tab; Take one tablet daily.  Prescribe prenatal covered by insurance  Dispense: 90 tablet; Refill: 11  - aspirin (ECOTRIN) 81 MG EC tablet; Take 1 tablet (81 mg total) by mouth once daily.; Refill: 0  - Connected MOM Enrollment  - Assign Connected MOM Program Consent Questionnaire    2. Nausea and vomiting during pregnancy prior to 22 weeks gestation  - famotidine (PEPCID) 20 MG tablet; Take 1 tablet (20 mg total) by mouth 2 (two) times daily.  Dispense: 60 tablet; Refill: 4  - ondansetron (ZOFRAN) 4 MG tablet; Take 1 tablet (4 mg total) by mouth every 6 (six) hours as needed for Nausea.  Dispense: 30 tablet; Refill: 4    3. 13 weeks gestation of pregnancy      Total time spent on visit was 20 minutes.  This includes face to face time and non-face to face time preparing to see the patient (eg, review of tests), Obtaining and/or reviewing separately obtained history, Documenting clinical information in the electronic or other health record, Independently interpreting resultsand communicating results to the patient/family/caregiver, or Care coordination.

## 2023-12-16 ENCOUNTER — PATIENT MESSAGE (OUTPATIENT)
Dept: ADMINISTRATIVE | Facility: OTHER | Age: 28
End: 2023-12-16
Payer: MEDICAID

## 2023-12-19 ENCOUNTER — PATIENT MESSAGE (OUTPATIENT)
Dept: MATERNAL FETAL MEDICINE | Facility: CLINIC | Age: 28
End: 2023-12-19
Payer: MEDICAID

## 2023-12-22 ENCOUNTER — TELEPHONE (OUTPATIENT)
Dept: MATERNAL FETAL MEDICINE | Facility: CLINIC | Age: 28
End: 2023-12-22
Payer: MEDICAID

## 2024-01-03 ENCOUNTER — PATIENT MESSAGE (OUTPATIENT)
Dept: OTHER | Facility: OTHER | Age: 29
End: 2024-01-03
Payer: MEDICAID

## 2024-01-10 ENCOUNTER — PATIENT MESSAGE (OUTPATIENT)
Dept: OTHER | Facility: OTHER | Age: 29
End: 2024-01-10
Payer: MEDICAID

## 2024-01-12 ENCOUNTER — ROUTINE PRENATAL (OUTPATIENT)
Dept: OBSTETRICS AND GYNECOLOGY | Facility: CLINIC | Age: 29
End: 2024-01-12
Payer: MEDICAID

## 2024-01-12 ENCOUNTER — LAB VISIT (OUTPATIENT)
Dept: LAB | Facility: HOSPITAL | Age: 29
End: 2024-01-12
Attending: OBSTETRICS & GYNECOLOGY
Payer: MEDICAID

## 2024-01-12 VITALS — BODY MASS INDEX: 31.3 KG/M2 | SYSTOLIC BLOOD PRESSURE: 110 MMHG | WEIGHT: 160.25 LBS | DIASTOLIC BLOOD PRESSURE: 80 MMHG

## 2024-01-12 DIAGNOSIS — O09.292 HISTORY OF PRE-ECLAMPSIA IN PRIOR PREGNANCY, CURRENTLY PREGNANT IN SECOND TRIMESTER: ICD-10-CM

## 2024-01-12 DIAGNOSIS — O09.292 HISTORY OF PRE-ECLAMPSIA IN PRIOR PREGNANCY, CURRENTLY PREGNANT IN SECOND TRIMESTER: Primary | ICD-10-CM

## 2024-01-12 DIAGNOSIS — Z3A.17 17 WEEKS GESTATION OF PREGNANCY: ICD-10-CM

## 2024-01-12 PROCEDURE — 99999 PR PBB SHADOW E&M-EST. PATIENT-LVL II: CPT | Mod: PBBFAC,,, | Performed by: OBSTETRICS & GYNECOLOGY

## 2024-01-12 PROCEDURE — 36415 COLL VENOUS BLD VENIPUNCTURE: CPT | Performed by: OBSTETRICS & GYNECOLOGY

## 2024-01-12 PROCEDURE — 99212 OFFICE O/P EST SF 10 MIN: CPT | Mod: PBBFAC,TH | Performed by: OBSTETRICS & GYNECOLOGY

## 2024-01-12 PROCEDURE — 82105 ALPHA-FETOPROTEIN SERUM: CPT | Performed by: OBSTETRICS & GYNECOLOGY

## 2024-01-12 PROCEDURE — 99213 OFFICE O/P EST LOW 20 MIN: CPT | Mod: TH,S$PBB,, | Performed by: OBSTETRICS & GYNECOLOGY

## 2024-01-12 NOTE — PROGRESS NOTES
17w2d  . Pt reports no complaints.  Denies contractions, vaginal bleeding, or leakage of fluid.   Trisomy screening:CFDNA nml, MSAFP ordered  F/U 4 weeks. Next visit: routine care             Total time spent on visit was 20 minutes.  This includes face to face time and non-face to face time preparing to see the patient (eg, review of tests), Obtaining and/or reviewing separately obtained history, Documenting clinical information in the electronic or other health record, Independently interpreting resultsand communicating results to the patient/family/caregiver, or Care coordination.

## 2024-01-23 LAB
# FETUSES US: NORMAL
AFP INTERPRETATION: NORMAL
AFP MOM SERPL: 0.9
AFP SERPL-MCNC: 36.2 NG/ML
AFP SERPL-MCNC: NEGATIVE NG/ML
AGE AT DELIVERY: 28
GA (DAYS): 2 D
GA (WEEKS): 17 WK
GESTATIONAL AGE METHOD: NORMAL
IDDM PATIENT QL: NORMAL
SMOKING STATUS FTND: NO

## 2024-01-25 ENCOUNTER — PATIENT MESSAGE (OUTPATIENT)
Dept: OBSTETRICS AND GYNECOLOGY | Facility: CLINIC | Age: 29
End: 2024-01-25
Payer: MEDICAID

## 2024-01-25 ENCOUNTER — PROCEDURE VISIT (OUTPATIENT)
Dept: MATERNAL FETAL MEDICINE | Facility: CLINIC | Age: 29
End: 2024-01-25
Payer: MEDICAID

## 2024-01-25 DIAGNOSIS — Z36.89 ENCOUNTER FOR FETAL ANATOMIC SURVEY: ICD-10-CM

## 2024-01-25 DIAGNOSIS — Z36.2 ENCOUNTER FOR FOLLOW-UP ULTRASOUND OF FETAL ANATOMY: Primary | ICD-10-CM

## 2024-01-25 PROCEDURE — 76805 OB US >/= 14 WKS SNGL FETUS: CPT | Mod: PBBFAC | Performed by: OBSTETRICS & GYNECOLOGY

## 2024-01-31 ENCOUNTER — PATIENT MESSAGE (OUTPATIENT)
Dept: OTHER | Facility: OTHER | Age: 29
End: 2024-01-31
Payer: MEDICAID

## 2024-02-16 ENCOUNTER — PATIENT MESSAGE (OUTPATIENT)
Dept: OBSTETRICS AND GYNECOLOGY | Facility: CLINIC | Age: 29
End: 2024-02-16
Payer: MEDICAID

## 2024-02-23 ENCOUNTER — TELEPHONE (OUTPATIENT)
Dept: OBSTETRICS AND GYNECOLOGY | Facility: CLINIC | Age: 29
End: 2024-02-23
Payer: MEDICAID

## 2024-02-26 ENCOUNTER — TELEPHONE (OUTPATIENT)
Dept: OBSTETRICS AND GYNECOLOGY | Facility: CLINIC | Age: 29
End: 2024-02-26
Payer: MEDICAID

## 2024-02-26 ENCOUNTER — PATIENT MESSAGE (OUTPATIENT)
Dept: OBSTETRICS AND GYNECOLOGY | Facility: CLINIC | Age: 29
End: 2024-02-26
Payer: MEDICAID

## 2024-02-28 ENCOUNTER — PATIENT MESSAGE (OUTPATIENT)
Dept: MATERNAL FETAL MEDICINE | Facility: CLINIC | Age: 29
End: 2024-02-28
Payer: MEDICAID

## 2024-02-28 ENCOUNTER — PATIENT MESSAGE (OUTPATIENT)
Dept: OTHER | Facility: OTHER | Age: 29
End: 2024-02-28
Payer: MEDICAID

## 2024-02-29 ENCOUNTER — ROUTINE PRENATAL (OUTPATIENT)
Dept: OBSTETRICS AND GYNECOLOGY | Facility: CLINIC | Age: 29
End: 2024-02-29
Payer: MEDICAID

## 2024-02-29 ENCOUNTER — PATIENT MESSAGE (OUTPATIENT)
Dept: OBSTETRICS AND GYNECOLOGY | Facility: CLINIC | Age: 29
End: 2024-02-29

## 2024-02-29 ENCOUNTER — PROCEDURE VISIT (OUTPATIENT)
Dept: MATERNAL FETAL MEDICINE | Facility: CLINIC | Age: 29
End: 2024-02-29
Payer: MEDICAID

## 2024-02-29 VITALS
DIASTOLIC BLOOD PRESSURE: 78 MMHG | SYSTOLIC BLOOD PRESSURE: 112 MMHG | WEIGHT: 167.56 LBS | BODY MASS INDEX: 32.72 KG/M2

## 2024-02-29 DIAGNOSIS — Z3A.24 24 WEEKS GESTATION OF PREGNANCY: ICD-10-CM

## 2024-02-29 DIAGNOSIS — O09.293 HISTORY OF PRE-ECLAMPSIA IN PRIOR PREGNANCY, CURRENTLY PREGNANT IN THIRD TRIMESTER: Primary | ICD-10-CM

## 2024-02-29 DIAGNOSIS — Z36.2 ENCOUNTER FOR FOLLOW-UP ULTRASOUND OF FETAL ANATOMY: ICD-10-CM

## 2024-02-29 DIAGNOSIS — Z36.89 ENCOUNTER FOR ULTRASOUND TO ASSESS FETAL GROWTH: Primary | ICD-10-CM

## 2024-02-29 PROCEDURE — 99213 OFFICE O/P EST LOW 20 MIN: CPT | Mod: TH,S$PBB,, | Performed by: OBSTETRICS & GYNECOLOGY

## 2024-02-29 PROCEDURE — 76816 OB US FOLLOW-UP PER FETUS: CPT | Mod: PBBFAC | Performed by: OBSTETRICS & GYNECOLOGY

## 2024-02-29 PROCEDURE — 99212 OFFICE O/P EST SF 10 MIN: CPT | Mod: PBBFAC,TH,25 | Performed by: OBSTETRICS & GYNECOLOGY

## 2024-02-29 PROCEDURE — 99999 PR PBB SHADOW E&M-EST. PATIENT-LVL II: CPT | Mod: PBBFAC,,, | Performed by: OBSTETRICS & GYNECOLOGY

## 2024-02-29 RX ORDER — PRENATAL VIT 27,CALC/IRON/FA 60 MG-1 MG
1 TABLET ORAL
COMMUNITY
Start: 2023-12-13

## 2024-02-29 NOTE — PROGRESS NOTES
24w1d   . Pt reports no complaints.  Denies contractions, vaginal bleeding, or leakage of fluid.   Trisomy screening:CFDNA nml, MSAFP normal  F/U 4 weeks. Next visit: routine care             Total time spent on visit was 20 minutes.  This includes face to face time and non-face to face time preparing to see the patient (eg, review of tests), Obtaining and/or reviewing separately obtained history, Documenting clinical information in the electronic or other health record, Independently interpreting resultsand communicating results to the patient/family/caregiver, or Care coordination.

## 2024-03-06 ENCOUNTER — OFFICE VISIT (OUTPATIENT)
Dept: PEDIATRIC CARDIOLOGY | Facility: CLINIC | Age: 29
End: 2024-03-06
Attending: PEDIATRICS
Payer: MEDICAID

## 2024-03-06 ENCOUNTER — CLINICAL SUPPORT (OUTPATIENT)
Dept: PEDIATRIC CARDIOLOGY | Facility: CLINIC | Age: 29
End: 2024-03-06
Payer: MEDICAID

## 2024-03-06 VITALS
DIASTOLIC BLOOD PRESSURE: 84 MMHG | HEIGHT: 60 IN | SYSTOLIC BLOOD PRESSURE: 125 MMHG | HEART RATE: 89 BPM | WEIGHT: 166.13 LBS | OXYGEN SATURATION: 99 % | BODY MASS INDEX: 32.62 KG/M2

## 2024-03-06 DIAGNOSIS — O35.9XX0 SUSPECTED FETAL ABNORMALITY AFFECTING MANAGEMENT OF MOTHER, ANTEPARTUM, NOT APPLICABLE OR UNSPECIFIED FETUS: Primary | ICD-10-CM

## 2024-03-06 PROCEDURE — 1160F RVW MEDS BY RX/DR IN RCRD: CPT | Mod: CPTII,,, | Performed by: PEDIATRICS

## 2024-03-06 PROCEDURE — 76825 ECHO EXAM OF FETAL HEART: CPT | Mod: PBBFAC | Performed by: PEDIATRICS

## 2024-03-06 PROCEDURE — 93325 DOPPLER ECHO COLOR FLOW MAPG: CPT | Mod: PBBFAC | Performed by: PEDIATRICS

## 2024-03-06 PROCEDURE — 3079F DIAST BP 80-89 MM HG: CPT | Mod: CPTII,,, | Performed by: PEDIATRICS

## 2024-03-06 PROCEDURE — 3008F BODY MASS INDEX DOCD: CPT | Mod: CPTII,,, | Performed by: PEDIATRICS

## 2024-03-06 PROCEDURE — 76827 ECHO EXAM OF FETAL HEART: CPT | Mod: 26,S$PBB,, | Performed by: PEDIATRICS

## 2024-03-06 PROCEDURE — 99203 OFFICE O/P NEW LOW 30 MIN: CPT | Mod: 25,S$PBB,, | Performed by: PEDIATRICS

## 2024-03-06 PROCEDURE — 76825 ECHO EXAM OF FETAL HEART: CPT | Mod: 26,S$PBB,, | Performed by: PEDIATRICS

## 2024-03-06 PROCEDURE — 99999 PR PBB SHADOW E&M-EST. PATIENT-LVL III: CPT | Mod: PBBFAC,,, | Performed by: PEDIATRICS

## 2024-03-06 PROCEDURE — 76827 ECHO EXAM OF FETAL HEART: CPT | Mod: PBBFAC | Performed by: PEDIATRICS

## 2024-03-06 PROCEDURE — 99213 OFFICE O/P EST LOW 20 MIN: CPT | Mod: PBBFAC,25 | Performed by: PEDIATRICS

## 2024-03-06 PROCEDURE — 1159F MED LIST DOCD IN RCRD: CPT | Mod: CPTII,,, | Performed by: PEDIATRICS

## 2024-03-06 PROCEDURE — 93325 DOPPLER ECHO COLOR FLOW MAPG: CPT | Mod: 26,S$PBB,, | Performed by: PEDIATRICS

## 2024-03-06 PROCEDURE — 3074F SYST BP LT 130 MM HG: CPT | Mod: CPTII,,, | Performed by: PEDIATRICS

## 2024-03-08 PROBLEM — O35.9XX0 SUSPECTED FETAL ABNORMALITY AFFECTING MANAGEMENT OF MOTHER, ANTEPARTUM, NOT APPLICABLE OR UNSPECIFIED FETUS: Status: ACTIVE | Noted: 2024-03-08

## 2024-03-08 NOTE — PROGRESS NOTES
Ms. Harris  is a 28 y.o. year old  , referred by Dr. Pugh because of sub-optimal imaging of the fetal heart.    The patient presented at approximately 25 weeks gestation (Patient's last menstrual period was 2023.).  The patient denied any complaints.    Past medical history: Unremarkable.  Past surgical history: Negative   Past gestational history: The patient has two healthy children (1 boy, 1 girl).    Family history: Negative for congenital heart disease, and sudden death during childhood.    Medications:   Outpatient Encounter Medications as of 3/6/2024   Medication Sig Dispense Refill    aspirin (ECOTRIN) 81 MG EC tablet Take 1 tablet (81 mg total) by mouth once daily.  0    famotidine (PEPCID) 20 MG tablet Take 1 tablet (20 mg total) by mouth 2 (two) times daily. (Patient not taking: Reported on 3/6/2024) 60 tablet 4    ondansetron (ZOFRAN) 4 MG tablet Take 1 tablet (4 mg total) by mouth every 6 (six) hours as needed for Nausea. (Patient not taking: Reported on 2024) 30 tablet 4    TRINATAL RX 1 60 mg iron-1 mg Tab Take 1 tablet by mouth.      [DISCONTINUED] labetaloL (NORMODYNE) 200 MG tablet Take 1 tablet (200 mg total) by mouth 2 (two) times daily. (Patient not taking: No sig reported) 60 tablet 11     No facility-administered encounter medications on file as of 3/6/2024.       Allergies: Patient has no known allergies.    Blood pressure 125/84, pulse 89, height 5' (1.524 m), weight 75.4 kg (166 lb 1.9 oz), last menstrual period 2023, SpO2 99 %.    Fetal echocardiogram revealed a four chamber fetal heart with situs solitus.  The ventricles appeared to be equal in size.  The contractility of both ventricles was good.  The fetal heart rate was within the normal range, and regular.  The interventricular septum appeared to be intact.  There were normally related great arteries seen.  The ductal and aortic arch were well visualized, and appeared to be widely patent.  There was no pleural or  pericardial effusion seen.    Doppler analysis revealed a three vessel umbilical cord, with normal flow patterns, and velocities by Doppler.  There was a normal flow pattern seen in the ductus venosus.  There was evidence of normal systemic, and pulmonary venous return seen.  There were normal inflow patterns seen across the AV-valves, without significant insufficiency.  There was no ventricular level shunt seen.  The right and left ventricular outflow tract, and ductal and aortic arch appeared to be unobstructed.    Impression:  It is our impression that Ms. Harris had a normal fetal echocardiogram.  As you know, small defects, and coarctation of the aorta cannot always be ruled out on fetal echocardiogram.  We discussed our findings with the patient, reviewed our images, and answered her questions. We also discussed the limitations of fetal echocardiography, but emphasized that her child appears to have normal cardiac anatomy.  No further follow up is scheduled in our clinic, but, of course, we will always be available to reevaluate this patient, if needed.    The above information was discussed in detail including the use of diagrams, with 30 minutes of total face to face time, with greater than 50% with counseling and coordination of care.  The discussion of the diagnosis and treatment options is as described above.      Time spent: 30 minutes, 50% dedicated to counseling.

## 2024-03-13 ENCOUNTER — PATIENT MESSAGE (OUTPATIENT)
Dept: OTHER | Facility: OTHER | Age: 29
End: 2024-03-13
Payer: MEDICAID

## 2024-03-25 ENCOUNTER — PROCEDURE VISIT (OUTPATIENT)
Dept: MATERNAL FETAL MEDICINE | Facility: CLINIC | Age: 29
End: 2024-03-25
Payer: MEDICAID

## 2024-03-25 DIAGNOSIS — Z36.89 ENCOUNTER FOR ULTRASOUND TO ASSESS FETAL GROWTH: Primary | ICD-10-CM

## 2024-03-25 DIAGNOSIS — Z36.89 ENCOUNTER FOR ULTRASOUND TO ASSESS FETAL GROWTH: ICD-10-CM

## 2024-03-25 PROCEDURE — 76816 OB US FOLLOW-UP PER FETUS: CPT | Mod: PBBFAC | Performed by: STUDENT IN AN ORGANIZED HEALTH CARE EDUCATION/TRAINING PROGRAM

## 2024-03-27 ENCOUNTER — PATIENT MESSAGE (OUTPATIENT)
Dept: OTHER | Facility: OTHER | Age: 29
End: 2024-03-27
Payer: MEDICAID

## 2024-03-28 ENCOUNTER — ROUTINE PRENATAL (OUTPATIENT)
Dept: OBSTETRICS AND GYNECOLOGY | Facility: CLINIC | Age: 29
End: 2024-03-28
Payer: MEDICAID

## 2024-03-28 ENCOUNTER — LAB VISIT (OUTPATIENT)
Dept: LAB | Facility: HOSPITAL | Age: 29
End: 2024-03-28
Attending: OBSTETRICS & GYNECOLOGY
Payer: MEDICAID

## 2024-03-28 VITALS
SYSTOLIC BLOOD PRESSURE: 120 MMHG | DIASTOLIC BLOOD PRESSURE: 88 MMHG | WEIGHT: 167.31 LBS | BODY MASS INDEX: 32.68 KG/M2

## 2024-03-28 DIAGNOSIS — Z3A.28 28 WEEKS GESTATION OF PREGNANCY: ICD-10-CM

## 2024-03-28 DIAGNOSIS — O09.293 HISTORY OF PRE-ECLAMPSIA IN PRIOR PREGNANCY, CURRENTLY PREGNANT IN THIRD TRIMESTER: ICD-10-CM

## 2024-03-28 DIAGNOSIS — O09.293 HISTORY OF PRE-ECLAMPSIA IN PRIOR PREGNANCY, CURRENTLY PREGNANT IN THIRD TRIMESTER: Primary | ICD-10-CM

## 2024-03-28 LAB
BASOPHILS # BLD AUTO: 0.04 K/UL (ref 0–0.2)
BASOPHILS NFR BLD: 0.7 % (ref 0–1.9)
DIFFERENTIAL METHOD BLD: ABNORMAL
EOSINOPHIL # BLD AUTO: 0.2 K/UL (ref 0–0.5)
EOSINOPHIL NFR BLD: 3.7 % (ref 0–8)
ERYTHROCYTE [DISTWIDTH] IN BLOOD BY AUTOMATED COUNT: 14 % (ref 11.5–14.5)
GLUCOSE SERPL-MCNC: 108 MG/DL (ref 70–140)
HCT VFR BLD AUTO: 26.6 % (ref 37–48.5)
HGB BLD-MCNC: 7.8 G/DL (ref 12–16)
IMM GRANULOCYTES # BLD AUTO: 0.01 K/UL (ref 0–0.04)
IMM GRANULOCYTES NFR BLD AUTO: 0.2 % (ref 0–0.5)
LYMPHOCYTES # BLD AUTO: 1.3 K/UL (ref 1–4.8)
LYMPHOCYTES NFR BLD: 21.2 % (ref 18–48)
MCH RBC QN AUTO: 22.3 PG (ref 27–31)
MCHC RBC AUTO-ENTMCNC: 29.3 G/DL (ref 32–36)
MCV RBC AUTO: 76 FL (ref 82–98)
MONOCYTES # BLD AUTO: 0.5 K/UL (ref 0.3–1)
MONOCYTES NFR BLD: 7.8 % (ref 4–15)
NEUTROPHILS # BLD AUTO: 4 K/UL (ref 1.8–7.7)
NEUTROPHILS NFR BLD: 66.4 % (ref 38–73)
NRBC BLD-RTO: 0 /100 WBC
PLATELET # BLD AUTO: 353 K/UL (ref 150–450)
PMV BLD AUTO: 10.1 FL (ref 9.2–12.9)
RBC # BLD AUTO: 3.5 M/UL (ref 4–5.4)
WBC # BLD AUTO: 5.99 K/UL (ref 3.9–12.7)

## 2024-03-28 PROCEDURE — 87340 HEPATITIS B SURFACE AG IA: CPT | Performed by: OBSTETRICS & GYNECOLOGY

## 2024-03-28 PROCEDURE — 99999 PR PBB SHADOW E&M-EST. PATIENT-LVL II: CPT | Mod: PBBFAC,,, | Performed by: OBSTETRICS & GYNECOLOGY

## 2024-03-28 PROCEDURE — 99213 OFFICE O/P EST LOW 20 MIN: CPT | Mod: TH,S$PBB,, | Performed by: OBSTETRICS & GYNECOLOGY

## 2024-03-28 PROCEDURE — 86592 SYPHILIS TEST NON-TREP QUAL: CPT | Performed by: OBSTETRICS & GYNECOLOGY

## 2024-03-28 PROCEDURE — 87389 HIV-1 AG W/HIV-1&-2 AB AG IA: CPT | Performed by: OBSTETRICS & GYNECOLOGY

## 2024-03-28 PROCEDURE — 82950 GLUCOSE TEST: CPT | Performed by: OBSTETRICS & GYNECOLOGY

## 2024-03-28 PROCEDURE — 36415 COLL VENOUS BLD VENIPUNCTURE: CPT | Performed by: OBSTETRICS & GYNECOLOGY

## 2024-03-28 PROCEDURE — 86803 HEPATITIS C AB TEST: CPT | Performed by: OBSTETRICS & GYNECOLOGY

## 2024-03-28 PROCEDURE — 99212 OFFICE O/P EST SF 10 MIN: CPT | Mod: PBBFAC,TH | Performed by: OBSTETRICS & GYNECOLOGY

## 2024-03-28 PROCEDURE — 81514 NFCT DS BV&VAGINITIS DNA ALG: CPT | Performed by: OBSTETRICS & GYNECOLOGY

## 2024-03-28 PROCEDURE — 85025 COMPLETE CBC W/AUTO DIFF WBC: CPT | Performed by: OBSTETRICS & GYNECOLOGY

## 2024-03-28 RX ORDER — TRIAMCINOLONE ACETONIDE 0.25 MG/G
CREAM TOPICAL 2 TIMES DAILY
Qty: 80 G | Refills: 1 | Status: ON HOLD | OUTPATIENT
Start: 2024-03-28 | End: 2024-06-07 | Stop reason: HOSPADM

## 2024-03-28 NOTE — PROGRESS NOTES
28w1d    . Pt reports no complaints.  Denies contractions, vaginal bleeding, or leakage of fluid.   Trisomy screening:CFDNA nml, MSAFP normal  28 wk labs ordered  F/U 2 weeks. Next visit: routine care             Total time spent on visit was 20 minutes.  This includes face to face time and non-face to face time preparing to see the patient (eg, review of tests), Obtaining and/or reviewing separately obtained history, Documenting clinical information in the electronic or other health record, Independently interpreting resultsand communicating results to the patient/family/caregiver, or Care coordination.

## 2024-03-29 LAB
HBV SURFACE AG SERPL QL IA: NORMAL
HCV AB SERPL QL IA: NORMAL
HIV 1+2 AB+HIV1 P24 AG SERPL QL IA: NORMAL

## 2024-03-30 LAB — RPR SER QL: NORMAL

## 2024-04-02 ENCOUNTER — PATIENT MESSAGE (OUTPATIENT)
Dept: OBSTETRICS AND GYNECOLOGY | Facility: HOSPITAL | Age: 29
End: 2024-04-02
Payer: MEDICAID

## 2024-04-02 DIAGNOSIS — D50.8 OTHER IRON DEFICIENCY ANEMIA: Primary | ICD-10-CM

## 2024-04-02 RX ORDER — FERROUS SULFATE 325(65) MG
325 TABLET ORAL 2 TIMES DAILY
Qty: 60 TABLET | Refills: 11 | Status: ON HOLD | OUTPATIENT
Start: 2024-04-02 | End: 2024-06-07 | Stop reason: HOSPADM

## 2024-04-10 ENCOUNTER — PATIENT MESSAGE (OUTPATIENT)
Dept: OTHER | Facility: OTHER | Age: 29
End: 2024-04-10
Payer: MEDICAID

## 2024-04-12 ENCOUNTER — ROUTINE PRENATAL (OUTPATIENT)
Dept: OBSTETRICS AND GYNECOLOGY | Facility: CLINIC | Age: 29
End: 2024-04-12
Payer: MEDICAID

## 2024-04-12 VITALS — WEIGHT: 173.5 LBS | SYSTOLIC BLOOD PRESSURE: 122 MMHG | BODY MASS INDEX: 33.88 KG/M2 | DIASTOLIC BLOOD PRESSURE: 76 MMHG

## 2024-04-12 DIAGNOSIS — O09.293 HISTORY OF PRE-ECLAMPSIA IN PRIOR PREGNANCY, CURRENTLY PREGNANT IN THIRD TRIMESTER: Primary | ICD-10-CM

## 2024-04-12 PROCEDURE — 99999 PR PBB SHADOW E&M-EST. PATIENT-LVL II: CPT | Mod: PBBFAC,,, | Performed by: OBSTETRICS & GYNECOLOGY

## 2024-04-12 PROCEDURE — 99212 OFFICE O/P EST SF 10 MIN: CPT | Mod: PBBFAC,TH | Performed by: OBSTETRICS & GYNECOLOGY

## 2024-04-12 PROCEDURE — 99213 OFFICE O/P EST LOW 20 MIN: CPT | Mod: TH,S$PBB,, | Performed by: OBSTETRICS & GYNECOLOGY

## 2024-04-12 NOTE — PROGRESS NOTES
30w2d     . Pt reports no complaints.  Denies contractions, vaginal bleeding, or leakage of fluid.   Trisomy screening:CFDNA nml, MSAFP normal  Declines TDAP and RSV  Consents signed 4/12/2024   F/U 2 weeks. Next visit: routine care             Total time spent on visit was 20 minutes.  This includes face to face time and non-face to face time preparing to see the patient (eg, review of tests), Obtaining and/or reviewing separately obtained history, Documenting clinical information in the electronic or other health record, Independently interpreting resultsand communicating results to the patient/family/caregiver, or Care coordination.

## 2024-04-16 ENCOUNTER — PATIENT MESSAGE (OUTPATIENT)
Dept: MATERNAL FETAL MEDICINE | Facility: CLINIC | Age: 29
End: 2024-04-16
Payer: MEDICAID

## 2024-04-16 ENCOUNTER — TELEPHONE (OUTPATIENT)
Dept: OBSTETRICS AND GYNECOLOGY | Facility: CLINIC | Age: 29
End: 2024-04-16
Payer: MEDICAID

## 2024-04-17 ENCOUNTER — PATIENT MESSAGE (OUTPATIENT)
Dept: MATERNAL FETAL MEDICINE | Facility: CLINIC | Age: 29
End: 2024-04-17

## 2024-04-17 ENCOUNTER — PROCEDURE VISIT (OUTPATIENT)
Dept: MATERNAL FETAL MEDICINE | Facility: CLINIC | Age: 29
End: 2024-04-17
Payer: MEDICAID

## 2024-04-17 DIAGNOSIS — Z36.89 ENCOUNTER FOR ULTRASOUND TO ASSESS FETAL GROWTH: Primary | ICD-10-CM

## 2024-04-17 DIAGNOSIS — Z36.89 ENCOUNTER FOR ULTRASOUND TO ASSESS FETAL GROWTH: ICD-10-CM

## 2024-04-17 PROCEDURE — 76816 OB US FOLLOW-UP PER FETUS: CPT | Mod: PBBFAC | Performed by: OBSTETRICS & GYNECOLOGY

## 2024-04-22 ENCOUNTER — TELEPHONE (OUTPATIENT)
Dept: OBSTETRICS AND GYNECOLOGY | Facility: CLINIC | Age: 29
End: 2024-04-22
Payer: MEDICAID

## 2024-04-22 ENCOUNTER — HOSPITAL ENCOUNTER (OUTPATIENT)
Facility: HOSPITAL | Age: 29
Discharge: HOME | End: 2024-04-22
Attending: OBSTETRICS & GYNECOLOGY | Admitting: OBSTETRICS & GYNECOLOGY
Payer: MEDICAID

## 2024-04-22 VITALS
HEART RATE: 88 BPM | TEMPERATURE: 98 F | RESPIRATION RATE: 16 BRPM | DIASTOLIC BLOOD PRESSURE: 94 MMHG | OXYGEN SATURATION: 100 % | SYSTOLIC BLOOD PRESSURE: 136 MMHG

## 2024-04-22 DIAGNOSIS — O16.9 ELEVATED BLOOD PRESSURE AFFECTING PREGNANCY, ANTEPARTUM: ICD-10-CM

## 2024-04-22 LAB
ALBUMIN SERPL BCP-MCNC: 2.5 G/DL (ref 3.5–5.2)
ALP SERPL-CCNC: 116 U/L (ref 55–135)
ALT SERPL W/O P-5'-P-CCNC: 7 U/L (ref 10–44)
ANION GAP SERPL CALC-SCNC: 6 MMOL/L (ref 8–16)
AST SERPL-CCNC: 14 U/L (ref 10–40)
BASOPHILS # BLD AUTO: 0.04 K/UL (ref 0–0.2)
BASOPHILS NFR BLD: 0.6 % (ref 0–1.9)
BILIRUB SERPL-MCNC: 0.7 MG/DL (ref 0.1–1)
BILIRUB UR QL STRIP: NEGATIVE
BUN SERPL-MCNC: 4 MG/DL (ref 6–20)
CALCIUM SERPL-MCNC: 8.3 MG/DL (ref 8.7–10.5)
CHLORIDE SERPL-SCNC: 108 MMOL/L (ref 95–110)
CLARITY UR: CLEAR
CO2 SERPL-SCNC: 24 MMOL/L (ref 23–29)
COLOR UR: YELLOW
CREAT SERPL-MCNC: 0.6 MG/DL (ref 0.5–1.4)
CREAT UR-MCNC: 43.7 MG/DL (ref 15–325)
DIFFERENTIAL METHOD BLD: ABNORMAL
EOSINOPHIL # BLD AUTO: 0.2 K/UL (ref 0–0.5)
EOSINOPHIL NFR BLD: 3.6 % (ref 0–8)
ERYTHROCYTE [DISTWIDTH] IN BLOOD BY AUTOMATED COUNT: 16.7 % (ref 11.5–14.5)
EST. GFR  (NO RACE VARIABLE): >60 ML/MIN/1.73 M^2
GLUCOSE SERPL-MCNC: 79 MG/DL (ref 70–110)
GLUCOSE UR QL STRIP: NEGATIVE
HCT VFR BLD AUTO: 26.9 % (ref 37–48.5)
HGB BLD-MCNC: 7.8 G/DL (ref 12–16)
HGB UR QL STRIP: NEGATIVE
IMM GRANULOCYTES # BLD AUTO: 0.02 K/UL (ref 0–0.04)
IMM GRANULOCYTES NFR BLD AUTO: 0.3 % (ref 0–0.5)
KETONES UR QL STRIP: NEGATIVE
LDH SERPL L TO P-CCNC: 159 U/L (ref 110–260)
LEUKOCYTE ESTERASE UR QL STRIP: NEGATIVE
LYMPHOCYTES # BLD AUTO: 1.2 K/UL (ref 1–4.8)
LYMPHOCYTES NFR BLD: 18.6 % (ref 18–48)
MCH RBC QN AUTO: 22.2 PG (ref 27–31)
MCHC RBC AUTO-ENTMCNC: 29 G/DL (ref 32–36)
MCV RBC AUTO: 77 FL (ref 82–98)
MONOCYTES # BLD AUTO: 0.6 K/UL (ref 0.3–1)
MONOCYTES NFR BLD: 9.1 % (ref 4–15)
NEUTROPHILS # BLD AUTO: 4.4 K/UL (ref 1.8–7.7)
NEUTROPHILS NFR BLD: 67.8 % (ref 38–73)
NITRITE UR QL STRIP: NEGATIVE
NRBC BLD-RTO: 0 /100 WBC
PH UR STRIP: 7 [PH] (ref 5–8)
PLATELET # BLD AUTO: 282 K/UL (ref 150–450)
PMV BLD AUTO: 9 FL (ref 9.2–12.9)
POTASSIUM SERPL-SCNC: 2.9 MMOL/L (ref 3.5–5.1)
PROT SERPL-MCNC: 6 G/DL (ref 6–8.4)
PROT UR QL STRIP: NEGATIVE
PROT UR-MCNC: <7 MG/DL
PROT/CREAT UR: NORMAL MG/G{CREAT} (ref 0–0.2)
RBC # BLD AUTO: 3.51 M/UL (ref 4–5.4)
SODIUM SERPL-SCNC: 138 MMOL/L (ref 136–145)
SP GR UR STRIP: 1.01 (ref 1–1.03)
URATE SERPL-MCNC: 3.7 MG/DL (ref 2.4–5.7)
URN SPEC COLLECT METH UR: NORMAL
UROBILINOGEN UR STRIP-ACNC: NEGATIVE EU/DL
WBC # BLD AUTO: 6.46 K/UL (ref 3.9–12.7)

## 2024-04-22 PROCEDURE — 81003 URINALYSIS AUTO W/O SCOPE: CPT | Performed by: OBSTETRICS & GYNECOLOGY

## 2024-04-22 PROCEDURE — 59025 FETAL NON-STRESS TEST: CPT

## 2024-04-22 PROCEDURE — 85025 COMPLETE CBC W/AUTO DIFF WBC: CPT | Performed by: OBSTETRICS & GYNECOLOGY

## 2024-04-22 PROCEDURE — 83615 LACTATE (LD) (LDH) ENZYME: CPT | Performed by: OBSTETRICS & GYNECOLOGY

## 2024-04-22 PROCEDURE — 63600175 PHARM REV CODE 636 W HCPCS: Performed by: OBSTETRICS & GYNECOLOGY

## 2024-04-22 PROCEDURE — 96372 THER/PROPH/DIAG INJ SC/IM: CPT

## 2024-04-22 PROCEDURE — 99211 OFF/OP EST MAY X REQ PHY/QHP: CPT | Mod: 25

## 2024-04-22 PROCEDURE — 80053 COMPREHEN METABOLIC PANEL: CPT | Performed by: OBSTETRICS & GYNECOLOGY

## 2024-04-22 PROCEDURE — 84550 ASSAY OF BLOOD/URIC ACID: CPT | Performed by: OBSTETRICS & GYNECOLOGY

## 2024-04-22 PROCEDURE — 82570 ASSAY OF URINE CREATININE: CPT | Performed by: OBSTETRICS & GYNECOLOGY

## 2024-04-22 PROCEDURE — 36415 COLL VENOUS BLD VENIPUNCTURE: CPT | Performed by: OBSTETRICS & GYNECOLOGY

## 2024-04-22 RX ORDER — ACETAMINOPHEN 500 MG
500 TABLET ORAL EVERY 6 HOURS PRN
Status: DISCONTINUED | OUTPATIENT
Start: 2024-04-22 | End: 2024-04-22 | Stop reason: HOSPADM

## 2024-04-22 RX ORDER — BETAMETHASONE SODIUM PHOSPHATE AND BETAMETHASONE ACETATE 3; 3 MG/ML; MG/ML
12 INJECTION, SUSPENSION INTRA-ARTICULAR; INTRALESIONAL; INTRAMUSCULAR; SOFT TISSUE ONCE
Status: COMPLETED | OUTPATIENT
Start: 2024-04-22 | End: 2024-04-22

## 2024-04-22 RX ADMIN — BETAMETHASONE ACETATE AND BETAMETHASONE SODIUM PHOSPHATE 12 MG: 3; 3 INJECTION, SUSPENSION INTRA-ARTICULAR; INTRALESIONAL; INTRAMUSCULAR; SOFT TISSUE at 04:04

## 2024-04-22 NOTE — NURSING
MD Koroma notified of patient's complaints, vital sign trends, and lab results. Orders to give betamethasone injection and to discharge the patient home with elevated blood pressure signs/symptoms/precautions. Patient to return to hospital tomorrow for 2nd steroid dose, NST, and BP check.

## 2024-04-22 NOTE — DISCHARGE INSTRUCTIONS
Home Undelivered Discharge Instructions    After Discharge Orders:    Future Appointments   Date Time Provider Department Center   4/26/2024 10:15 AM Yani Koroma MD [facility]   5/10/2024 10:45 AM Yani Koroma MD [facility]   5/14/2024 10:20 AM OB/GN ROOM 2 [facility]   5/24/2024 10:45 AM Yani Koroma MD [facility]   5/31/2024 10:45 AM Yani Koroma MD [facility]   6/7/2024 10:45 AM Yani Koroma, MD [facility]   6/14/2024 10:45 AM Yani Koroma MD [facility]   6/21/2024 10:45 AM Yani Koroma MD [facility]           Current Discharge Medication List        CONTINUE these medications which have NOT CHANGED    Details   TRINATAL RX 1 60 mg iron-1 mg Tab Take 1 tablet by mouth.      aspirin (ECOTRIN) 81 MG EC tablet Take 1 tablet (81 mg total) by mouth once daily.  Refills: 0    Associated Diagnoses: History of pre-eclampsia in prior pregnancy, currently pregnant in second trimester      famotidine (PEPCID) 20 MG tablet Take 1 tablet (20 mg total) by mouth 2 (two) times daily.  Qty: 60 tablet, Refills: 4    Associated Diagnoses: Nausea and vomiting during pregnancy prior to 22 weeks gestation      ferrous sulfate (FEOSOL) 325 mg (65 mg iron) Tab tablet Take 1 tablet (325 mg total) by mouth 2 (two) times daily.  Qty: 60 tablet, Refills: 11    Associated Diagnoses: Other iron deficiency anemia      ondansetron (ZOFRAN) 4 MG tablet Take 1 tablet (4 mg total) by mouth every 6 (six) hours as needed for Nausea.  Qty: 30 tablet, Refills: 4    Associated Diagnoses: Nausea and vomiting during pregnancy prior to 22 weeks gestation      triamcinolone acetonide 0.025% (KENALOG) 0.025 % cream Apply topically 2 (two) times daily.  Qty: 80 g, Refills: 1    Associated Diagnoses: History of pre-eclampsia in prior pregnancy, currently pregnant in third trimester                     Diet:  normal diet  as tolerated    Rest: normal activity as tolerated    Other instructions: Do kick counts once a day on your baby. Choose the time of day your baby is most active. Get in a comfortable lying or sitting position and time how long it takes to feel 10 kicks, twists, turns, swishes, or rolls. Call your physician or midwife if there have not been 10 kicks in 2 hours    Call physician or midwife, return to Labor and Delivery, call 911, or go to the nearest Emergency Room if: increased leakage or fluid, contractions more than  10 per  2 hours, decreased fetal movement, persistent low back pain or cramping, bleeding from vaginal area, difficulty urinating, pain with urination, difficulty breathing, new calf pain, persistent headache, or vision change

## 2024-04-22 NOTE — TELEPHONE ENCOUNTER
Spoke with pt she states that her b/p is now 142/118 pt states that she is getting up to go to the ED

## 2024-04-22 NOTE — TELEPHONE ENCOUNTER
"----- Message from Erin Ryan sent at 4/22/2024  1:06 PM CDT -----  Regarding: Patient Advice                    Name of Who is Calling:  Gurdeep Harris    Who Left The Message:  Gurdeep Harris      What is the request in detail:   Patient called requesting a call regarding what she's experiencing presently. Patient states, "her BP is 155 over 97 and has headaches."  Also says, "she's  been sleeping all day."   Please further advise.    Thank you       Reply by [facility]: NO      Preferred Call Back :  [phone]  "

## 2024-04-23 ENCOUNTER — HOSPITAL ENCOUNTER (OUTPATIENT)
Facility: HOSPITAL | Age: 29
Discharge: HOME | End: 2024-04-23
Attending: OBSTETRICS & GYNECOLOGY | Admitting: OBSTETRICS & GYNECOLOGY
Payer: MEDICAID

## 2024-04-23 VITALS
BODY MASS INDEX: 33.96 KG/M2 | DIASTOLIC BLOOD PRESSURE: 91 MMHG | RESPIRATION RATE: 18 BRPM | SYSTOLIC BLOOD PRESSURE: 137 MMHG | OXYGEN SATURATION: 100 % | WEIGHT: 173 LBS | HEART RATE: 98 BPM | HEIGHT: 60 IN | TEMPERATURE: 98 F

## 2024-04-23 PROCEDURE — 59025 FETAL NON-STRESS TEST: CPT

## 2024-04-23 PROCEDURE — 99211 OFF/OP EST MAY X REQ PHY/QHP: CPT | Mod: 25

## 2024-04-23 PROCEDURE — 63600175 PHARM REV CODE 636 W HCPCS: Performed by: OBSTETRICS & GYNECOLOGY

## 2024-04-23 RX ORDER — BETAMETHASONE SODIUM PHOSPHATE AND BETAMETHASONE ACETATE 3; 3 MG/ML; MG/ML
12 INJECTION, SUSPENSION INTRA-ARTICULAR; INTRALESIONAL; INTRAMUSCULAR; SOFT TISSUE ONCE
Status: COMPLETED | OUTPATIENT
Start: 2024-04-23 | End: 2024-04-23

## 2024-04-23 RX ADMIN — BETAMETHASONE SODIUM PHOSPHATE AND BETAMETHASONE ACETATE 12 MG: 3; 3 INJECTION, SUSPENSION INTRA-ARTICULAR; INTRALESIONAL; INTRAMUSCULAR at 01:04

## 2024-04-23 NOTE — NURSING
Dr. Koroma at bedside. BP reviewed, NST reactive and plan of care discussed. F/U appointment this Friday. Discussed maternal warnings and S/S of PIH. Left ambulatory in stable condition.

## 2024-04-23 NOTE — NURSING
Patient arrived for 2nd dose of steroids, serial BP readings and NST for Hx of elevated Bps. Plan of care discussed. LUCYM applied x2.

## 2024-04-23 NOTE — DISCHARGE INSTRUCTIONS
Home Undelivered Discharge Instructions    After Discharge Orders:    Future Appointments   Date Time Provider Department Center   4/26/2024 10:15 AM Yani Koroma MD [facility]   5/10/2024 10:45 AM Yani Koroma MD [facility]   5/14/2024 10:20 AM OB/GN ROOM 2 ULTRASOUND [facility]   5/24/2024 10:45 AM Yani Koroma MD [facility]   5/31/2024 10:45 AM Yani Koroma MD [facility]   6/7/2024 10:45 AM Yani Koroma, MD [facility]   6/14/2024 10:45 AM Yani Koroma MD [facility]   6/21/2024 10:45 AM Yani oKroma, MD [facility]         Current Discharge Medication List        CONTINUE these medications which have NOT CHANGED    Details   aspirin (ECOTRIN) 81 MG EC tablet Take 1 tablet (81 mg total) by mouth once daily.  Refills: 0    Associated Diagnoses: History of pre-eclampsia in prior pregnancy, currently pregnant in second trimester      famotidine (PEPCID) 20 MG tablet Take 1 tablet (20 mg total) by mouth 2 (two) times daily.  Qty: 60 tablet, Refills: 4    Associated Diagnoses: Nausea and vomiting during pregnancy prior to 22 weeks gestation      ferrous sulfate (FEOSOL) 325 mg (65 mg iron) Tab tablet Take 1 tablet (325 mg total) by mouth 2 (two) times daily.  Qty: 60 tablet, Refills: 11    Associated Diagnoses: Other iron deficiency anemia      ondansetron (ZOFRAN) 4 MG tablet Take 1 tablet (4 mg total) by mouth every 6 (six) hours as needed for Nausea.  Qty: 30 tablet, Refills: 4    Associated Diagnoses: Nausea and vomiting during pregnancy prior to 22 weeks gestation      triamcinolone acetonide 0.025% (KENALOG) 0.025 % cream Apply topically 2 (two) times daily.  Qty: 80 g, Refills: 1    Associated Diagnoses: History of pre-eclampsia in prior pregnancy, currently pregnant in third trimester      TRINATAL RX 1 60 mg iron-1 mg Tab Take 1 tablet by mouth.                     Diet:  normal diet  as tolerated    Rest: normal activity as tolerated    Other instructions: Do kick counts once a day on your baby. Choose the time of day your baby is most active. Get in a comfortable lying or sitting position and time how long it takes to feel 10 kicks, twists, turns, swishes, or rolls. Call your physician or midwife if there have not been 10 kicks in 2 hours    Call physician or midwife, return to Labor and Delivery, call 911, or go to the nearest Emergency Room if: increased leakage or fluid, contractions more than  5 per  30 minutes, decreased fetal movement, persistent low back pain or cramping, bleeding from vaginal area, difficulty urinating, pain with urination, difficulty breathing, new calf pain, persistent headache, or vision change

## 2024-04-24 ENCOUNTER — PATIENT MESSAGE (OUTPATIENT)
Dept: OTHER | Facility: OTHER | Age: 29
End: 2024-04-24
Payer: MEDICAID

## 2024-04-26 ENCOUNTER — PATIENT MESSAGE (OUTPATIENT)
Dept: OBSTETRICS AND GYNECOLOGY | Facility: CLINIC | Age: 29
End: 2024-04-26

## 2024-05-14 ENCOUNTER — PROCEDURE VISIT (OUTPATIENT)
Dept: MATERNAL FETAL MEDICINE | Facility: CLINIC | Age: 29
End: 2024-05-14
Payer: MEDICAID

## 2024-05-14 DIAGNOSIS — Z36.89 ENCOUNTER FOR ULTRASOUND TO ASSESS FETAL GROWTH: ICD-10-CM

## 2024-05-14 DIAGNOSIS — O36.5990 PREGNANCY AFFECTED BY FETAL GROWTH RESTRICTION: Primary | ICD-10-CM

## 2024-05-14 PROCEDURE — 76820 UMBILICAL ARTERY ECHO: CPT | Mod: 26,S$PBB,, | Performed by: OBSTETRICS & GYNECOLOGY

## 2024-05-14 PROCEDURE — 76819 FETAL BIOPHYS PROFIL W/O NST: CPT | Mod: 26,S$PBB,, | Performed by: OBSTETRICS & GYNECOLOGY

## 2024-05-14 PROCEDURE — 76816 OB US FOLLOW-UP PER FETUS: CPT | Mod: PBBFAC | Performed by: OBSTETRICS & GYNECOLOGY

## 2024-05-15 ENCOUNTER — PATIENT MESSAGE (OUTPATIENT)
Dept: OTHER | Facility: OTHER | Age: 29
End: 2024-05-15
Payer: MEDICAID

## 2024-05-17 ENCOUNTER — CLINICAL SUPPORT (OUTPATIENT)
Dept: OBSTETRICS AND GYNECOLOGY | Facility: CLINIC | Age: 29
End: 2024-05-17
Payer: MEDICAID

## 2024-05-17 ENCOUNTER — ROUTINE PRENATAL (OUTPATIENT)
Dept: OBSTETRICS AND GYNECOLOGY | Facility: CLINIC | Age: 29
End: 2024-05-17
Payer: MEDICAID

## 2024-05-17 VITALS
BODY MASS INDEX: 33.93 KG/M2 | WEIGHT: 173.75 LBS | DIASTOLIC BLOOD PRESSURE: 70 MMHG | SYSTOLIC BLOOD PRESSURE: 130 MMHG

## 2024-05-17 DIAGNOSIS — O36.5990 PREGNANCY AFFECTED BY FETAL GROWTH RESTRICTION: ICD-10-CM

## 2024-05-17 DIAGNOSIS — Z3A.35 35 WEEKS GESTATION OF PREGNANCY: ICD-10-CM

## 2024-05-17 DIAGNOSIS — O09.293 HISTORY OF PRE-ECLAMPSIA IN PRIOR PREGNANCY, CURRENTLY PREGNANT IN THIRD TRIMESTER: ICD-10-CM

## 2024-05-17 DIAGNOSIS — Z3A.35 35 WEEKS GESTATION OF PREGNANCY: Primary | ICD-10-CM

## 2024-05-17 DIAGNOSIS — O36.5990 PREGNANCY AFFECTED BY FETAL GROWTH RESTRICTION: Primary | ICD-10-CM

## 2024-05-17 PROCEDURE — 99213 OFFICE O/P EST LOW 20 MIN: CPT | Mod: TH,S$PBB,, | Performed by: OBSTETRICS & GYNECOLOGY

## 2024-05-17 PROCEDURE — 99212 OFFICE O/P EST SF 10 MIN: CPT | Mod: PBBFAC,TH | Performed by: OBSTETRICS & GYNECOLOGY

## 2024-05-17 PROCEDURE — 99999 PR PBB SHADOW E&M-EST. PATIENT-LVL II: CPT | Mod: PBBFAC,,, | Performed by: OBSTETRICS & GYNECOLOGY

## 2024-05-17 RX ORDER — AMLODIPINE BESYLATE 5 MG/1
5 TABLET ORAL DAILY
Qty: 30 TABLET | Refills: 11 | Status: SHIPPED | OUTPATIENT
Start: 2024-05-17 | End: 2025-05-17

## 2024-05-17 RX ORDER — AMLODIPINE BESYLATE 5 MG/1
TABLET ORAL
COMMUNITY
Start: 2024-04-22 | End: 2024-05-17 | Stop reason: SDUPTHER

## 2024-05-17 NOTE — PROGRESS NOTES
35w2d      . Pt reports no complaints.  Denies contractions, vaginal bleeding, or leakage of fluid.   Trisomy screening:CFDNA nml, MSAFP normal  Declines TDAP and RSV  Consents signed 4/12/2024   F/U 1 week. Next visit: routine care     1. Pregnancy affected by fetal growth restriction  - amLODIPine (NORVASC) 5 MG tablet; Take 1 tablet (5 mg total) by mouth once daily.  Dispense: 30 tablet; Refill: 11    2. History of pre-eclampsia in prior pregnancy, currently pregnant in third trimester  - Group B Streptococcus, PCR             Total time spent on visit was 20 minutes.  This includes face to face time and non-face to face time preparing to see the patient (eg, review of tests), Obtaining and/or reviewing separately obtained history, Documenting clinical information in the electronic or other health record, Independently interpreting resultsand communicating results to the patient/family/caregiver, or Care coordination.

## 2024-05-17 NOTE — PROGRESS NOTES
NST completed in prenatal testing clinic. NST reactive and reassuring. Patient denies regular/painful cxtn, however does endorse mild irregular cramping. She denies leaking of fluid or vaginal bleeding. Patient reports + fetal movement.        05/17/24 0900   Non Stress Test - General   Indications/Pt Reported Complaint FGR   Test Duration (min) 21 min   Number of Fetuses 1   Acoustic Stimulator No   Contractions Not present   Nonstress Test Baby A   Variability Moderate   Decels None   Accels Present   Baseline    Interpretation Baby A   Nonstress Test Interpretation Reactive   Overall Impression Reassuring

## 2024-05-20 ENCOUNTER — PROCEDURE VISIT (OUTPATIENT)
Dept: MATERNAL FETAL MEDICINE | Facility: CLINIC | Age: 29
End: 2024-05-20
Payer: MEDICAID

## 2024-05-20 DIAGNOSIS — Z36.89 ENCOUNTER FOR ULTRASOUND TO ASSESS FETAL GROWTH: ICD-10-CM

## 2024-05-20 DIAGNOSIS — O36.5990 PREGNANCY AFFECTED BY FETAL GROWTH RESTRICTION: ICD-10-CM

## 2024-05-20 PROCEDURE — 76820 UMBILICAL ARTERY ECHO: CPT | Mod: 26,S$PBB,, | Performed by: OBSTETRICS & GYNECOLOGY

## 2024-05-20 PROCEDURE — 76819 FETAL BIOPHYS PROFIL W/O NST: CPT | Mod: PBBFAC | Performed by: OBSTETRICS & GYNECOLOGY

## 2024-05-24 ENCOUNTER — ROUTINE PRENATAL (OUTPATIENT)
Dept: OBSTETRICS AND GYNECOLOGY | Facility: CLINIC | Age: 29
End: 2024-05-24
Payer: MEDICAID

## 2024-05-24 ENCOUNTER — CLINICAL SUPPORT (OUTPATIENT)
Dept: OBSTETRICS AND GYNECOLOGY | Facility: CLINIC | Age: 29
End: 2024-05-24
Payer: MEDICAID

## 2024-05-24 VITALS
SYSTOLIC BLOOD PRESSURE: 114 MMHG | DIASTOLIC BLOOD PRESSURE: 74 MMHG | WEIGHT: 170.44 LBS | BODY MASS INDEX: 33.28 KG/M2

## 2024-05-24 DIAGNOSIS — O36.5990 PREGNANCY AFFECTED BY FETAL GROWTH RESTRICTION: ICD-10-CM

## 2024-05-24 DIAGNOSIS — O09.293 HISTORY OF PRE-ECLAMPSIA IN PRIOR PREGNANCY, CURRENTLY PREGNANT IN THIRD TRIMESTER: ICD-10-CM

## 2024-05-24 DIAGNOSIS — Z3A.36 36 WEEKS GESTATION OF PREGNANCY: ICD-10-CM

## 2024-05-24 DIAGNOSIS — Z3A.36 36 WEEKS GESTATION OF PREGNANCY: Primary | ICD-10-CM

## 2024-05-24 DIAGNOSIS — O36.5990 PREGNANCY AFFECTED BY FETAL GROWTH RESTRICTION: Primary | ICD-10-CM

## 2024-05-24 PROCEDURE — 99213 OFFICE O/P EST LOW 20 MIN: CPT | Mod: TH,S$PBB,, | Performed by: OBSTETRICS & GYNECOLOGY

## 2024-05-24 PROCEDURE — 87653 STREP B DNA AMP PROBE: CPT | Performed by: OBSTETRICS & GYNECOLOGY

## 2024-05-24 PROCEDURE — 99999 PR PBB SHADOW E&M-EST. PATIENT-LVL II: CPT | Mod: PBBFAC,,, | Performed by: OBSTETRICS & GYNECOLOGY

## 2024-05-24 PROCEDURE — 99212 OFFICE O/P EST SF 10 MIN: CPT | Mod: PBBFAC,TH | Performed by: OBSTETRICS & GYNECOLOGY

## 2024-05-24 NOTE — PROGRESS NOTES
NST completed in prenatal testing clinic. NST reactive and reassuring. Patient denies cxtn, leaking of fluid or vaginal bleeding. Patient reports + fetal movement.        05/24/24 1028   Non Stress Test - General   Indications/Pt Reported Complaint FGR   Test Duration (min) 24 min   Number of Fetuses 1   Acoustic Stimulator No   Contractions Irregular   Nonstress Test Baby A   Variability Moderate   Decels None   Accels Present   Baseline FHR   (135-140 bpm)   Interpretation Baby A   Nonstress Test Interpretation Reactive   Overall Impression Reassuring

## 2024-05-24 NOTE — PROGRESS NOTES
36w2d       . Pt reports no complaints.  Denies contractions, vaginal bleeding, or leakage of fluid.   Trisomy screening:CFDNA nml, MSAFP normal  Declines TDAP and RSV  Consents signed 4/12/2024   F/U 1 week. Next visit: routine care         - Will schedule IOL for 38 wks.        Total time spent on visit was 20 minutes.  This includes face to face time and non-face to face time preparing to see the patient (eg, review of tests), Obtaining and/or reviewing separately obtained history, Documenting clinical information in the electronic or other health record, Independently interpreting resultsand communicating results to the patient/family/caregiver, or Care coordination.

## 2024-05-27 ENCOUNTER — PROCEDURE VISIT (OUTPATIENT)
Dept: MATERNAL FETAL MEDICINE | Facility: CLINIC | Age: 29
End: 2024-05-27
Payer: MEDICAID

## 2024-05-27 DIAGNOSIS — O36.5990 PREGNANCY AFFECTED BY FETAL GROWTH RESTRICTION: ICD-10-CM

## 2024-05-27 DIAGNOSIS — Z36.89 ENCOUNTER FOR ULTRASOUND TO ASSESS FETAL GROWTH: ICD-10-CM

## 2024-05-27 LAB — GROUP B STREPTOCOCCUS, PCR: POSITIVE

## 2024-05-27 PROCEDURE — 76819 FETAL BIOPHYS PROFIL W/O NST: CPT | Mod: PBBFAC | Performed by: OBSTETRICS & GYNECOLOGY

## 2024-05-27 PROCEDURE — 76820 UMBILICAL ARTERY ECHO: CPT | Mod: 26,S$PBB,, | Performed by: OBSTETRICS & GYNECOLOGY

## 2024-05-28 ENCOUNTER — PATIENT MESSAGE (OUTPATIENT)
Dept: OBSTETRICS AND GYNECOLOGY | Facility: CLINIC | Age: 29
End: 2024-05-28
Payer: MEDICAID

## 2024-05-28 ENCOUNTER — TELEPHONE (OUTPATIENT)
Dept: OBSTETRICS AND GYNECOLOGY | Facility: CLINIC | Age: 29
End: 2024-05-28
Payer: MEDICAID

## 2024-05-28 NOTE — TELEPHONE ENCOUNTER
Spoke with pt on the phone. Pt will wait for paperwork because Dr. Koroma will have to sign it. Pt understands.

## 2024-05-31 ENCOUNTER — ROUTINE PRENATAL (OUTPATIENT)
Dept: OBSTETRICS AND GYNECOLOGY | Facility: CLINIC | Age: 29
End: 2024-05-31
Payer: MEDICAID

## 2024-05-31 ENCOUNTER — CLINICAL SUPPORT (OUTPATIENT)
Dept: OBSTETRICS AND GYNECOLOGY | Facility: CLINIC | Age: 29
End: 2024-05-31
Payer: MEDICAID

## 2024-05-31 VITALS — DIASTOLIC BLOOD PRESSURE: 84 MMHG | WEIGHT: 164 LBS | SYSTOLIC BLOOD PRESSURE: 128 MMHG | BODY MASS INDEX: 32.03 KG/M2

## 2024-05-31 DIAGNOSIS — O36.5990 PREGNANCY AFFECTED BY FETAL GROWTH RESTRICTION: Primary | ICD-10-CM

## 2024-05-31 DIAGNOSIS — Z3A.37 37 WEEKS GESTATION OF PREGNANCY: ICD-10-CM

## 2024-05-31 DIAGNOSIS — Z3A.37 37 WEEKS GESTATION OF PREGNANCY: Primary | ICD-10-CM

## 2024-05-31 DIAGNOSIS — O36.5990 PREGNANCY AFFECTED BY FETAL GROWTH RESTRICTION: ICD-10-CM

## 2024-05-31 PROCEDURE — 99999 PR PBB SHADOW E&M-EST. PATIENT-LVL II: CPT | Mod: PBBFAC,,, | Performed by: OBSTETRICS & GYNECOLOGY

## 2024-05-31 PROCEDURE — 99212 OFFICE O/P EST SF 10 MIN: CPT | Mod: PBBFAC,TH | Performed by: OBSTETRICS & GYNECOLOGY

## 2024-05-31 PROCEDURE — 99213 OFFICE O/P EST LOW 20 MIN: CPT | Mod: TH,S$PBB,, | Performed by: OBSTETRICS & GYNECOLOGY

## 2024-05-31 NOTE — PROGRESS NOTES
37w2d         . Pt reports no complaints.  Denies contractions, vaginal bleeding, or leakage of fluid.   Trisomy screening:CFDNA nml, MSAFP normal  Declines TDAP and RSV  Consents signed 4/12/2024   F/U 1 week. Next visit: routine care     CVX 2/50/-2    - Will schedule IOL for 38 wks.        Total time spent on visit was 20 minutes.  This includes face to face time and non-face to face time preparing to see the patient (eg, review of tests), Obtaining and/or reviewing separately obtained history, Documenting clinical information in the electronic or other health record, Independently interpreting resultsand communicating results to the patient/family/caregiver, or Care coordination.

## 2024-05-31 NOTE — PROGRESS NOTES
NST completed in prenatal testing clinic. NST reactive and reassuring. Patient denies regular/painful cxtn, however does state she feels irregular abdominal tightening. Denies leaking of fluid or vaginal bleeding. Patient reports + fetal movement.        05/31/24 1036   Non Stress Test - General   Indications/Pt Reported Complaint FGR   Test Duration (min) 23 min   Number of Fetuses 1   Acoustic Stimulator Yes   Response Acceleration   Contractions Irregular   Nonstress Test Baby A   Variability Moderate   Decels None   Accels Present   Baseline FHR   (135-140 bpm)   Interpretation Baby A   Nonstress Test Interpretation Reactive   Overall Impression Reassuring

## 2024-06-03 ENCOUNTER — PROCEDURE VISIT (OUTPATIENT)
Dept: MATERNAL FETAL MEDICINE | Facility: CLINIC | Age: 29
End: 2024-06-03
Payer: MEDICAID

## 2024-06-03 DIAGNOSIS — Z36.89 ENCOUNTER FOR ULTRASOUND TO ASSESS FETAL GROWTH: ICD-10-CM

## 2024-06-03 DIAGNOSIS — O36.5990 PREGNANCY AFFECTED BY FETAL GROWTH RESTRICTION: ICD-10-CM

## 2024-06-03 PROCEDURE — 76816 OB US FOLLOW-UP PER FETUS: CPT | Mod: PBBFAC | Performed by: OBSTETRICS & GYNECOLOGY

## 2024-06-03 PROCEDURE — 76820 UMBILICAL ARTERY ECHO: CPT | Mod: 26,S$PBB,, | Performed by: OBSTETRICS & GYNECOLOGY

## 2024-06-03 PROCEDURE — 76819 FETAL BIOPHYS PROFIL W/O NST: CPT | Mod: 26,S$PBB,, | Performed by: OBSTETRICS & GYNECOLOGY

## 2024-06-05 DIAGNOSIS — O36.5990 PREGNANCY AFFECTED BY FETAL GROWTH RESTRICTION: Primary | ICD-10-CM

## 2024-06-05 DIAGNOSIS — O36.5990 IUGR (INTRAUTERINE GROWTH RESTRICTION) AFFECTING CARE OF MOTHER: ICD-10-CM

## 2024-06-05 RX ORDER — OXYTOCIN/RINGER'S LACTATE 30/500 ML
95 PLASTIC BAG, INJECTION (ML) INTRAVENOUS ONCE
Status: CANCELLED | OUTPATIENT
Start: 2024-06-05 | End: 2024-06-05

## 2024-06-05 RX ORDER — OXYTOCIN/RINGER'S LACTATE 30/500 ML
334 PLASTIC BAG, INJECTION (ML) INTRAVENOUS ONCE AS NEEDED
Status: CANCELLED | OUTPATIENT
Start: 2024-06-05 | End: 2035-11-02

## 2024-06-05 RX ORDER — OXYTOCIN/RINGER'S LACTATE 30/500 ML
95 PLASTIC BAG, INJECTION (ML) INTRAVENOUS ONCE AS NEEDED
Status: CANCELLED | OUTPATIENT
Start: 2024-06-05 | End: 2035-11-02

## 2024-06-05 RX ORDER — SODIUM CHLORIDE 0.9 % (FLUSH) 0.9 %
10 SYRINGE (ML) INJECTION
Status: CANCELLED | OUTPATIENT
Start: 2024-06-05

## 2024-06-05 RX ORDER — METHYLERGONOVINE MALEATE 0.2 MG/ML
200 INJECTION INTRAVENOUS ONCE AS NEEDED
Status: CANCELLED | OUTPATIENT
Start: 2024-06-05 | End: 2035-11-02

## 2024-06-05 RX ORDER — CEFAZOLIN SODIUM 1 G/50ML
1 SOLUTION INTRAVENOUS
Status: CANCELLED | OUTPATIENT
Start: 2024-06-05

## 2024-06-05 RX ORDER — MUPIROCIN 20 MG/G
OINTMENT TOPICAL
Status: CANCELLED | OUTPATIENT
Start: 2024-06-05

## 2024-06-05 RX ORDER — ONDANSETRON 8 MG/1
8 TABLET, ORALLY DISINTEGRATING ORAL EVERY 8 HOURS PRN
Status: CANCELLED | OUTPATIENT
Start: 2024-06-05

## 2024-06-05 RX ORDER — OXYTOCIN/RINGER'S LACTATE 30/500 ML
0-32 PLASTIC BAG, INJECTION (ML) INTRAVENOUS CONTINUOUS
Status: CANCELLED | OUTPATIENT
Start: 2024-06-05

## 2024-06-05 RX ORDER — CALCIUM CARBONATE 200(500)MG
500 TABLET,CHEWABLE ORAL 3 TIMES DAILY PRN
Status: CANCELLED | OUTPATIENT
Start: 2024-06-05

## 2024-06-05 RX ORDER — MISOPROSTOL 200 UG/1
800 TABLET ORAL ONCE AS NEEDED
Status: CANCELLED | OUTPATIENT
Start: 2024-06-05 | End: 2035-11-02

## 2024-06-05 RX ORDER — SODIUM CHLORIDE, SODIUM LACTATE, POTASSIUM CHLORIDE, CALCIUM CHLORIDE 600; 310; 30; 20 MG/100ML; MG/100ML; MG/100ML; MG/100ML
INJECTION, SOLUTION INTRAVENOUS CONTINUOUS
Status: CANCELLED | OUTPATIENT
Start: 2024-06-05

## 2024-06-05 RX ORDER — DIPHENOXYLATE HYDROCHLORIDE AND ATROPINE SULFATE 2.5; .025 MG/1; MG/1
2 TABLET ORAL EVERY 6 HOURS PRN
Status: CANCELLED | OUTPATIENT
Start: 2024-06-05

## 2024-06-05 RX ORDER — SODIUM CHLORIDE 9 MG/ML
INJECTION, SOLUTION INTRAVENOUS
Status: CANCELLED | OUTPATIENT
Start: 2024-06-05

## 2024-06-05 RX ORDER — OXYTOCIN/RINGER'S LACTATE 30/500 ML
334 PLASTIC BAG, INJECTION (ML) INTRAVENOUS ONCE
Status: CANCELLED | OUTPATIENT
Start: 2024-06-05 | End: 2024-06-05

## 2024-06-05 RX ORDER — LIDOCAINE HYDROCHLORIDE 10 MG/ML
10 INJECTION INFILTRATION; PERINEURAL ONCE AS NEEDED
Status: CANCELLED | OUTPATIENT
Start: 2024-06-05 | End: 2035-11-02

## 2024-06-05 RX ORDER — CEFAZOLIN SODIUM 2 G/50ML
2 SOLUTION INTRAVENOUS ONCE
Status: CANCELLED | OUTPATIENT
Start: 2024-06-05 | End: 2024-06-05

## 2024-06-05 RX ORDER — MISOPROSTOL 200 UG/1
800 TABLET ORAL ONCE AS NEEDED
Status: CANCELLED | OUTPATIENT
Start: 2024-06-05

## 2024-06-05 RX ORDER — BUTORPHANOL TARTRATE 2 MG/ML
2 INJECTION INTRAMUSCULAR; INTRAVENOUS
Status: CANCELLED | OUTPATIENT
Start: 2024-06-05

## 2024-06-05 RX ORDER — BUTORPHANOL TARTRATE 1 MG/ML
1 INJECTION INTRAMUSCULAR; INTRAVENOUS
Status: CANCELLED | OUTPATIENT
Start: 2024-06-05

## 2024-06-05 RX ORDER — ACETAMINOPHEN 325 MG/1
650 TABLET ORAL EVERY 6 HOURS PRN
Status: CANCELLED | OUTPATIENT
Start: 2024-06-05

## 2024-06-05 RX ORDER — SIMETHICONE 80 MG
1 TABLET,CHEWABLE ORAL 4 TIMES DAILY PRN
Status: CANCELLED | OUTPATIENT
Start: 2024-06-05

## 2024-06-05 RX ORDER — OXYTOCIN 10 [USP'U]/ML
10 INJECTION, SOLUTION INTRAMUSCULAR; INTRAVENOUS ONCE AS NEEDED
Status: CANCELLED | OUTPATIENT
Start: 2024-06-05 | End: 2035-11-02

## 2024-06-05 RX ORDER — CARBOPROST TROMETHAMINE 250 UG/ML
250 INJECTION, SOLUTION INTRAMUSCULAR
Status: CANCELLED | OUTPATIENT
Start: 2024-06-05

## 2024-06-05 NOTE — H&P (VIEW-ONLY)
[facility] - OB GYN  History & Physical  Obstetrics   Labor and Delivery Triage    SUBJECTIVE:     Patient Info:  Gurdeep Harris         28 y.o.    female    1995     Chief Complaint/Reason for Admission: scheduled induction of labor    History of Present Illness:  Patient presents at 38w0d with Estimated Date of Delivery: 24 for induction of labor.  She denies contractions or vaginal bleeding.  Denies leakage of fluid.  Fetal Movement: normal. Her current obstetrical history is significant for IUGR, GBS +.        OB History:   OB History          3    Para   2    Term   2       0    AB   0    Living   2         SAB   0    IAB   0    Ectopic   0    Multiple   0    Live Births   2                   Estimated Date of Delivery: 24     Gestational Age:  38w0d    Past Medical History:  Past Medical History:   Diagnosis Date    Anemia         Past Surgical History:  No past surgical history on file.    Social History:   Alcohol/Tobacco:  Social History     Tobacco Use    Smoking status: Never     Passive exposure: Never    Smokeless tobacco: Never   Substance Use Topics    Alcohol use: No      Drug Use:  Social History     Substance and Sexual Activity   Drug Use No       Family History:  Family History   Problem Relation Name Age of Onset    Breast cancer Neg Hx      Colon cancer Neg Hx      Ovarian cancer Neg Hx         Allergies:  Review of patient's allergies indicates:  No Known Allergies    Meds Prior to Arrival:  Prior to Admission medications    Medication Sig Start Date End Date Taking? Authorizing Provider   amLODIPine (NORVASC) 5 MG tablet Take 1 tablet (5 mg total) by mouth once daily. 24  Yani Koroma MD   aspirin (ECOTRIN) 81 MG EC tablet Take 1 tablet (81 mg total) by mouth once daily.  Patient not taking: Reported on 23  Yani Koroma MD   famotidine (PEPCID) 20 MG tablet Take 1 tablet (20 mg total) by mouth 2 (two) times  daily.  Patient not taking: Reported on 4/12/2024 12/13/23 12/12/24  Yani Koroma MD   ferrous sulfate (FEOSOL) 325 mg (65 mg iron) Tab tablet Take 1 tablet (325 mg total) by mouth 2 (two) times daily.  Patient not taking: Reported on 4/12/2024 4/2/24 4/2/25  Yani Koroma MD   ondansetron (ZOFRAN) 4 MG tablet Take 1 tablet (4 mg total) by mouth every 6 (six) hours as needed for Nausea.  Patient not taking: Reported on 4/12/2024 12/13/23 12/12/24  Yani Koroma MD   triamcinolone acetonide 0.025% (KENALOG) 0.025 % cream Apply topically 2 (two) times daily.  Patient not taking: Reported on 4/12/2024 3/28/24   Yani Koroma MD   TRINATAL RX 1 60 mg iron-1 mg Tab Take 1 tablet by mouth. 12/13/23   Provider, Historical   labetaloL (NORMODYNE) 200 MG tablet Take 1 tablet (200 mg total) by mouth 2 (two) times daily.  Patient not taking: No sig reported 6/7/21 10/31/21  Yani Koroma MD        Review of Systems:  Constitutional: no fever or chills  Eyes: no visual changes  ENT: no nasal congestion or sore throat  Respiratory: no cough or shortness of breath  Cardiovascular: no chest pain or palpitations  Gastrointestinal: no nausea or vomiting, tolerating diet  Genitourinary: no hematuria or dysuria  Integument/Breast: no rash or pruritis  Hematologic/Lymphatic: no easy bruising or lymphadenopathy  Musculoskeletal: no arthralgias or myalgias  Neurological: no seizures or tremors  Behavioral/Psych: no auditory or visual hallucinations  Endocrine: no heat or cold intolerance    OBJECTIVE:     Recent Vitals:  LMP 09/11/2023     Exam:    General: well developed, well nourished  Lungs:  clear to auscultation bilaterally  Heart: regular rate and rhythm  Abdomen: Gravid and nontender  Pelvic:  Cervix: 2/50/-2  Extremities: no cyanosis or edema, or clubbing    Lab Results:  No results found for this or any previous visit (from the past 36 hour(s)).  Lab Results   Component Value Date    GROUPTR O POS 10/23/2023                 ASSESSMENT/PLAN:     Dx:28 y.o.  at 38w0d here for induction of labor secondary to IUGR.   - GBS +  - history of pre-E in prior pregnancy    Admit to MD: Yani Koroma MD    Admit to: admitted to the hospital    induction of labor    - Admit to labor and delivery for induction of labor.  - Will start induction of labor with Pitocin.  - Continuous tocometer and external fetal heart rate monitor.  - CBC and type and screen.  - Consents signed and on the chart.  - Consult anesthesia prn for epidural.   - Ancef for GBX Prophylaxis.    Yani Koroma MD  2024 4:21 PM

## 2024-06-05 NOTE — H&P
[facility] [place] - OB GYN  History & Physical  Obstetrics   Labor and Delivery Triage    SUBJECTIVE:     Patient Info:  Gurdeep Harris         28 y.o.    female    1995     Chief Complaint/Reason for Admission: scheduled induction of labor    History of Present Illness:  Patient presents at 38w0d with Estimated Date of Delivery: 24 for induction of labor.  She denies contractions or vaginal bleeding.  Denies leakage of fluid.  Fetal Movement: normal. Her current obstetrical history is significant for IUGR, GBS +.        OB History:   OB History          3    Para   2    Term   2       0    AB   0    Living   2         SAB   0    IAB   0    Ectopic   0    Multiple   0    Live Births   2                   Estimated Date of Delivery: 24     Gestational Age:  38w0d    Past Medical History:  Past Medical History:   Diagnosis Date    Anemia         Past Surgical History:  No past surgical history on file.    Social History:   Alcohol/Tobacco:  Social History     Tobacco Use    Smoking status: Never     Passive exposure: Never    Smokeless tobacco: Never   Substance Use Topics    Alcohol use: No      Drug Use:  Social History     Substance and Sexual Activity   Drug Use No       Family History:  Family History   Problem Relation Name Age of Onset    Breast cancer Neg Hx      Colon cancer Neg Hx      Ovarian cancer Neg Hx         Allergies:  Review of patient's allergies indicates:  No Known Allergies    Meds Prior to Arrival:  Prior to Admission medications    Medication Sig Start Date End Date Taking? Authorizing Provider   amLODIPine (NORVASC) 5 MG tablet Take 1 tablet (5 mg total) by mouth once daily. 24  Yani Koroma MD   aspirin (ECOTRIN) 81 MG EC tablet Take 1 tablet (81 mg total) by mouth once daily.  Patient not taking: Reported on 23  Yani Koroma MD   famotidine (PEPCID) 20 MG tablet Take 1 tablet (20 mg total) by mouth 2 (two) times  daily.  Patient not taking: Reported on 4/12/2024 12/13/23 12/12/24  Yani Koroma MD   ferrous sulfate (FEOSOL) 325 mg (65 mg iron) Tab tablet Take 1 tablet (325 mg total) by mouth 2 (two) times daily.  Patient not taking: Reported on 4/12/2024 4/2/24 4/2/25  Yani Koroma MD   ondansetron (ZOFRAN) 4 MG tablet Take 1 tablet (4 mg total) by mouth every 6 (six) hours as needed for Nausea.  Patient not taking: Reported on 4/12/2024 12/13/23 12/12/24  Yani Koroma MD   triamcinolone acetonide 0.025% (KENALOG) 0.025 % cream Apply topically 2 (two) times daily.  Patient not taking: Reported on 4/12/2024 3/28/24   Yani Koroma MD   TRINATAL RX 1 60 mg iron-1 mg Tab Take 1 tablet by mouth. 12/13/23   Provider, Historical   labetaloL (NORMODYNE) 200 MG tablet Take 1 tablet (200 mg total) by mouth 2 (two) times daily.  Patient not taking: No sig reported 6/7/21 10/31/21  Yani Koroma MD        Review of Systems:  Constitutional: no fever or chills  Eyes: no visual changes  ENT: no nasal congestion or sore throat  Respiratory: no cough or shortness of breath  Cardiovascular: no chest pain or palpitations  Gastrointestinal: no nausea or vomiting, tolerating diet  Genitourinary: no hematuria or dysuria  Integument/Breast: no rash or pruritis  Hematologic/Lymphatic: no easy bruising or lymphadenopathy  Musculoskeletal: no arthralgias or myalgias  Neurological: no seizures or tremors  Behavioral/Psych: no auditory or visual hallucinations  Endocrine: no heat or cold intolerance    OBJECTIVE:     Recent Vitals:  LMP 09/11/2023     Exam:    General: well developed, well nourished  Lungs:  clear to auscultation bilaterally  Heart: regular rate and rhythm  Abdomen: Gravid and nontender  Pelvic:  Cervix: 2/50/-2  Extremities: no cyanosis or edema, or clubbing    Lab Results:  No results found for this or any previous visit (from the past 36 hour(s)).  Lab Results   Component Value Date    GROUPTR O POS 10/23/2023                 ASSESSMENT/PLAN:     Dx:28 y.o.  at 38w0d here for induction of labor secondary to IUGR.   - GBS +  - history of pre-E in prior pregnancy    Admit to MD: Yani Koroma MD    Admit to: admitted to the hospital    induction of labor    - Admit to labor and delivery for induction of labor.  - Will start induction of labor with Pitocin.  - Continuous tocometer and external fetal heart rate monitor.  - CBC and type and screen.  - Consents signed and on the chart.  - Consult anesthesia prn for epidural.   - Ancef for GBX Prophylaxis.    Yani Koroma MD  2024 4:21 PM

## 2024-06-06 ENCOUNTER — ANESTHESIA (OUTPATIENT)
Dept: OBSTETRICS AND GYNECOLOGY | Facility: HOSPITAL | Age: 29
End: 2024-06-06
Payer: MEDICAID

## 2024-06-06 ENCOUNTER — ANESTHESIA EVENT (OUTPATIENT)
Dept: OBSTETRICS AND GYNECOLOGY | Facility: HOSPITAL | Age: 29
End: 2024-06-06
Payer: MEDICAID

## 2024-06-06 ENCOUNTER — HOSPITAL ENCOUNTER (INPATIENT)
Facility: HOSPITAL | Age: 29
LOS: 2 days | Discharge: HOME | End: 2024-06-08
Attending: OBSTETRICS & GYNECOLOGY | Admitting: OBSTETRICS & GYNECOLOGY
Payer: MEDICAID

## 2024-06-06 DIAGNOSIS — O36.5990 PREGNANCY AFFECTED BY FETAL GROWTH RESTRICTION: ICD-10-CM

## 2024-06-06 DIAGNOSIS — O36.5990 IUGR (INTRAUTERINE GROWTH RESTRICTION) AFFECTING CARE OF MOTHER: ICD-10-CM

## 2024-06-06 LAB
ABO + RH BLD: NORMAL
BASOPHILS # BLD AUTO: 0.02 K/UL (ref 0–0.2)
BASOPHILS NFR BLD: 0.3 % (ref 0–1.9)
BLD GP AB SCN CELLS X3 SERPL QL: NORMAL
DIFFERENTIAL METHOD BLD: ABNORMAL
EOSINOPHIL # BLD AUTO: 0.1 K/UL (ref 0–0.5)
EOSINOPHIL NFR BLD: 1.4 % (ref 0–8)
ERYTHROCYTE [DISTWIDTH] IN BLOOD BY AUTOMATED COUNT: 23 % (ref 11.5–14.5)
HCT VFR BLD AUTO: 32.5 % (ref 37–48.5)
HGB BLD-MCNC: 10.1 G/DL (ref 12–16)
IMM GRANULOCYTES # BLD AUTO: 0.02 K/UL (ref 0–0.04)
IMM GRANULOCYTES NFR BLD AUTO: 0.3 % (ref 0–0.5)
LYMPHOCYTES # BLD AUTO: 1.6 K/UL (ref 1–4.8)
LYMPHOCYTES NFR BLD: 23 % (ref 18–48)
MCH RBC QN AUTO: 23.4 PG (ref 27–31)
MCHC RBC AUTO-ENTMCNC: 31.1 G/DL (ref 32–36)
MCV RBC AUTO: 75 FL (ref 82–98)
MONOCYTES # BLD AUTO: 0.6 K/UL (ref 0.3–1)
MONOCYTES NFR BLD: 9 % (ref 4–15)
NEUTROPHILS # BLD AUTO: 4.7 K/UL (ref 1.8–7.7)
NEUTROPHILS NFR BLD: 66 % (ref 38–73)
NRBC BLD-RTO: 0 /100 WBC
PLATELET # BLD AUTO: 276 K/UL (ref 150–450)
PMV BLD AUTO: 10 FL (ref 9.2–12.9)
RBC # BLD AUTO: 4.31 M/UL (ref 4–5.4)
SPECIMEN OUTDATE: NORMAL
TREPONEMA PALLIDUM IGG+IGM AB [PRESENCE] IN SERUM OR PLASMA BY IMMUNOASSAY: NONREACTIVE
WBC # BLD AUTO: 7.1 K/UL (ref 3.9–12.7)

## 2024-06-06 PROCEDURE — C1751 CATH, INF, PER/CENT/MIDLINE: HCPCS | Performed by: ANESTHESIOLOGY

## 2024-06-06 PROCEDURE — 25000003 PHARM REV CODE 250: Performed by: OBSTETRICS & GYNECOLOGY

## 2024-06-06 PROCEDURE — 86593 SYPHILIS TEST NON-TREP QUANT: CPT | Performed by: OBSTETRICS & GYNECOLOGY

## 2024-06-06 PROCEDURE — 59409 OBSTETRICAL CARE: CPT | Mod: AT,,, | Performed by: OBSTETRICS & GYNECOLOGY

## 2024-06-06 PROCEDURE — 59409 OBSTETRICAL CARE: CPT | Mod: AA,,, | Performed by: ANESTHESIOLOGY

## 2024-06-06 PROCEDURE — 63600175 PHARM REV CODE 636 W HCPCS: Performed by: OBSTETRICS & GYNECOLOGY

## 2024-06-06 PROCEDURE — 11000001 HC ACUTE MED/SURG PRIVATE ROOM

## 2024-06-06 PROCEDURE — 27200710 HC EPIDURAL INFUSION PUMP SET: Performed by: ANESTHESIOLOGY

## 2024-06-06 PROCEDURE — 72200005 HC VAGINAL DELIVERY LEVEL II

## 2024-06-06 PROCEDURE — 62326 NJX INTERLAMINAR LMBR/SAC: CPT | Performed by: ANESTHESIOLOGY

## 2024-06-06 PROCEDURE — 10907ZC DRAINAGE OF AMNIOTIC FLUID, THERAPEUTIC FROM PRODUCTS OF CONCEPTION, VIA NATURAL OR ARTIFICIAL OPENING: ICD-10-PCS | Performed by: OBSTETRICS & GYNECOLOGY

## 2024-06-06 PROCEDURE — 85025 COMPLETE CBC W/AUTO DIFF WBC: CPT | Performed by: OBSTETRICS & GYNECOLOGY

## 2024-06-06 PROCEDURE — 86850 RBC ANTIBODY SCREEN: CPT | Performed by: OBSTETRICS & GYNECOLOGY

## 2024-06-06 PROCEDURE — 3E033VJ INTRODUCTION OF OTHER HORMONE INTO PERIPHERAL VEIN, PERCUTANEOUS APPROACH: ICD-10-PCS | Performed by: OBSTETRICS & GYNECOLOGY

## 2024-06-06 RX ORDER — OXYTOCIN/RINGER'S LACTATE 30/500 ML
0-32 PLASTIC BAG, INJECTION (ML) INTRAVENOUS CONTINUOUS
Status: DISCONTINUED | OUTPATIENT
Start: 2024-06-06 | End: 2024-06-06

## 2024-06-06 RX ORDER — MISOPROSTOL 200 UG/1
800 TABLET ORAL ONCE AS NEEDED
Status: DISCONTINUED | OUTPATIENT
Start: 2024-06-06 | End: 2024-06-08 | Stop reason: HOSPADM

## 2024-06-06 RX ORDER — OXYTOCIN/RINGER'S LACTATE 30/500 ML
334 PLASTIC BAG, INJECTION (ML) INTRAVENOUS ONCE AS NEEDED
Status: DISCONTINUED | OUTPATIENT
Start: 2024-06-06 | End: 2024-06-06

## 2024-06-06 RX ORDER — CARBOPROST TROMETHAMINE 250 UG/ML
250 INJECTION, SOLUTION INTRAMUSCULAR
Status: DISCONTINUED | OUTPATIENT
Start: 2024-06-06 | End: 2024-06-06

## 2024-06-06 RX ORDER — OXYTOCIN 10 [USP'U]/ML
10 INJECTION, SOLUTION INTRAMUSCULAR; INTRAVENOUS ONCE AS NEEDED
Status: DISCONTINUED | OUTPATIENT
Start: 2024-06-06 | End: 2024-06-08 | Stop reason: HOSPADM

## 2024-06-06 RX ORDER — CALCIUM CARBONATE 200(500)MG
500 TABLET,CHEWABLE ORAL 3 TIMES DAILY PRN
Status: DISCONTINUED | OUTPATIENT
Start: 2024-06-06 | End: 2024-06-06

## 2024-06-06 RX ORDER — BUTORPHANOL TARTRATE 2 MG/ML
2 INJECTION INTRAMUSCULAR; INTRAVENOUS
Status: DISCONTINUED | OUTPATIENT
Start: 2024-06-06 | End: 2024-06-06

## 2024-06-06 RX ORDER — OXYTOCIN/RINGER'S LACTATE 30/500 ML
95 PLASTIC BAG, INJECTION (ML) INTRAVENOUS ONCE AS NEEDED
Status: DISCONTINUED | OUTPATIENT
Start: 2024-06-06 | End: 2024-06-06

## 2024-06-06 RX ORDER — ONDANSETRON 8 MG/1
8 TABLET, ORALLY DISINTEGRATING ORAL EVERY 8 HOURS PRN
Status: DISCONTINUED | OUTPATIENT
Start: 2024-06-06 | End: 2024-06-08 | Stop reason: HOSPADM

## 2024-06-06 RX ORDER — SIMETHICONE 80 MG
1 TABLET,CHEWABLE ORAL EVERY 6 HOURS PRN
Status: DISCONTINUED | OUTPATIENT
Start: 2024-06-06 | End: 2024-06-08 | Stop reason: HOSPADM

## 2024-06-06 RX ORDER — CARBOPROST TROMETHAMINE 250 UG/ML
250 INJECTION, SOLUTION INTRAMUSCULAR
Status: DISCONTINUED | OUTPATIENT
Start: 2024-06-06 | End: 2024-06-08 | Stop reason: HOSPADM

## 2024-06-06 RX ORDER — OXYTOCIN/RINGER'S LACTATE 30/500 ML
95 PLASTIC BAG, INJECTION (ML) INTRAVENOUS ONCE
Status: DISCONTINUED | OUTPATIENT
Start: 2024-06-06 | End: 2024-06-06

## 2024-06-06 RX ORDER — TRANEXAMIC ACID 10 MG/ML
1000 INJECTION, SOLUTION INTRAVENOUS EVERY 30 MIN PRN
Status: DISCONTINUED | OUTPATIENT
Start: 2024-06-06 | End: 2024-06-06

## 2024-06-06 RX ORDER — SODIUM CHLORIDE 0.9 % (FLUSH) 0.9 %
10 SYRINGE (ML) INJECTION
Status: DISCONTINUED | OUTPATIENT
Start: 2024-06-06 | End: 2024-06-06

## 2024-06-06 RX ORDER — SODIUM CHLORIDE, SODIUM LACTATE, POTASSIUM CHLORIDE, CALCIUM CHLORIDE 600; 310; 30; 20 MG/100ML; MG/100ML; MG/100ML; MG/100ML
INJECTION, SOLUTION INTRAVENOUS CONTINUOUS
Status: DISCONTINUED | OUTPATIENT
Start: 2024-06-06 | End: 2024-06-06

## 2024-06-06 RX ORDER — IBUPROFEN 600 MG/1
600 TABLET ORAL EVERY 6 HOURS
Status: DISCONTINUED | OUTPATIENT
Start: 2024-06-06 | End: 2024-06-08 | Stop reason: HOSPADM

## 2024-06-06 RX ORDER — DIPHENOXYLATE HYDROCHLORIDE AND ATROPINE SULFATE 2.5; .025 MG/1; MG/1
2 TABLET ORAL EVERY 6 HOURS PRN
Status: DISCONTINUED | OUTPATIENT
Start: 2024-06-06 | End: 2024-06-06

## 2024-06-06 RX ORDER — ACETAMINOPHEN 500 MG
1000 TABLET ORAL EVERY 6 HOURS PRN
Status: DISCONTINUED | OUTPATIENT
Start: 2024-06-06 | End: 2024-06-08 | Stop reason: HOSPADM

## 2024-06-06 RX ORDER — SODIUM CHLORIDE 9 MG/ML
INJECTION, SOLUTION INTRAVENOUS
Status: DISCONTINUED | OUTPATIENT
Start: 2024-06-06 | End: 2024-06-06

## 2024-06-06 RX ORDER — LIDOCAINE HYDROCHLORIDE 10 MG/ML
10 INJECTION INFILTRATION; PERINEURAL ONCE AS NEEDED
Status: DISCONTINUED | OUTPATIENT
Start: 2024-06-06 | End: 2024-06-06

## 2024-06-06 RX ORDER — BUTORPHANOL TARTRATE 2 MG/ML
1 INJECTION INTRAMUSCULAR; INTRAVENOUS
Status: DISCONTINUED | OUTPATIENT
Start: 2024-06-06 | End: 2024-06-06

## 2024-06-06 RX ORDER — MUPIROCIN 20 MG/G
OINTMENT TOPICAL
Status: DISCONTINUED | OUTPATIENT
Start: 2024-06-06 | End: 2024-06-06

## 2024-06-06 RX ORDER — DIPHENOXYLATE HYDROCHLORIDE AND ATROPINE SULFATE 2.5; .025 MG/1; MG/1
2 TABLET ORAL EVERY 6 HOURS PRN
Status: DISCONTINUED | OUTPATIENT
Start: 2024-06-06 | End: 2024-06-08 | Stop reason: HOSPADM

## 2024-06-06 RX ORDER — DIPHENHYDRAMINE HCL 25 MG
25 CAPSULE ORAL EVERY 4 HOURS PRN
Status: DISCONTINUED | OUTPATIENT
Start: 2024-06-06 | End: 2024-06-08 | Stop reason: HOSPADM

## 2024-06-06 RX ORDER — METHYLERGONOVINE MALEATE 0.2 MG/ML
200 INJECTION INTRAVENOUS ONCE AS NEEDED
Status: DISCONTINUED | OUTPATIENT
Start: 2024-06-06 | End: 2024-06-08 | Stop reason: HOSPADM

## 2024-06-06 RX ORDER — PRENATAL WITH FERROUS FUM AND FOLIC ACID 3080; 920; 120; 400; 22; 1.84; 3; 20; 10; 1; 12; 200; 27; 25; 2 [IU]/1; [IU]/1; MG/1; [IU]/1; MG/1; MG/1; MG/1; MG/1; MG/1; MG/1; UG/1; MG/1; MG/1; MG/1; MG/1
1 TABLET ORAL DAILY
Status: DISCONTINUED | OUTPATIENT
Start: 2024-06-06 | End: 2024-06-08 | Stop reason: HOSPADM

## 2024-06-06 RX ORDER — OXYTOCIN/RINGER'S LACTATE 30/500 ML
334 PLASTIC BAG, INJECTION (ML) INTRAVENOUS ONCE
Status: DISCONTINUED | OUTPATIENT
Start: 2024-06-06 | End: 2024-06-06

## 2024-06-06 RX ORDER — DIPHENHYDRAMINE HYDROCHLORIDE 50 MG/ML
25 INJECTION INTRAMUSCULAR; INTRAVENOUS EVERY 4 HOURS PRN
Status: DISCONTINUED | OUTPATIENT
Start: 2024-06-06 | End: 2024-06-08 | Stop reason: HOSPADM

## 2024-06-06 RX ORDER — ACETAMINOPHEN 325 MG/1
650 TABLET ORAL EVERY 6 HOURS PRN
Status: DISCONTINUED | OUTPATIENT
Start: 2024-06-06 | End: 2024-06-06

## 2024-06-06 RX ORDER — SIMETHICONE 80 MG
1 TABLET,CHEWABLE ORAL 4 TIMES DAILY PRN
Status: DISCONTINUED | OUTPATIENT
Start: 2024-06-06 | End: 2024-06-06

## 2024-06-06 RX ORDER — OXYTOCIN/RINGER'S LACTATE 30/500 ML
95 PLASTIC BAG, INJECTION (ML) INTRAVENOUS ONCE
Status: COMPLETED | OUTPATIENT
Start: 2024-06-06 | End: 2024-06-06

## 2024-06-06 RX ORDER — OXYTOCIN/RINGER'S LACTATE 30/500 ML
334 PLASTIC BAG, INJECTION (ML) INTRAVENOUS ONCE AS NEEDED
Status: DISCONTINUED | OUTPATIENT
Start: 2024-06-06 | End: 2024-06-08 | Stop reason: HOSPADM

## 2024-06-06 RX ORDER — HYDROCORTISONE 25 MG/G
CREAM TOPICAL 3 TIMES DAILY PRN
Status: DISCONTINUED | OUTPATIENT
Start: 2024-06-06 | End: 2024-06-08 | Stop reason: HOSPADM

## 2024-06-06 RX ORDER — TRANEXAMIC ACID 10 MG/ML
1000 INJECTION, SOLUTION INTRAVENOUS EVERY 30 MIN PRN
Status: DISCONTINUED | OUTPATIENT
Start: 2024-06-06 | End: 2024-06-08 | Stop reason: HOSPADM

## 2024-06-06 RX ORDER — SODIUM CHLORIDE 0.9 % (FLUSH) 0.9 %
10 SYRINGE (ML) INJECTION
Status: DISCONTINUED | OUTPATIENT
Start: 2024-06-06 | End: 2024-06-08 | Stop reason: HOSPADM

## 2024-06-06 RX ORDER — MISOPROSTOL 200 UG/1
800 TABLET ORAL ONCE AS NEEDED
Status: DISCONTINUED | OUTPATIENT
Start: 2024-06-06 | End: 2024-06-06

## 2024-06-06 RX ORDER — DOCUSATE SODIUM 100 MG/1
200 CAPSULE, LIQUID FILLED ORAL 2 TIMES DAILY PRN
Status: DISCONTINUED | OUTPATIENT
Start: 2024-06-06 | End: 2024-06-08 | Stop reason: HOSPADM

## 2024-06-06 RX ORDER — OXYCODONE HYDROCHLORIDE 5 MG/1
5 TABLET ORAL EVERY 4 HOURS PRN
Status: DISCONTINUED | OUTPATIENT
Start: 2024-06-06 | End: 2024-06-08 | Stop reason: HOSPADM

## 2024-06-06 RX ORDER — OXYTOCIN 10 [USP'U]/ML
10 INJECTION, SOLUTION INTRAMUSCULAR; INTRAVENOUS ONCE AS NEEDED
Status: DISCONTINUED | OUTPATIENT
Start: 2024-06-06 | End: 2024-06-06

## 2024-06-06 RX ORDER — OXYTOCIN/RINGER'S LACTATE 30/500 ML
95 PLASTIC BAG, INJECTION (ML) INTRAVENOUS ONCE AS NEEDED
Status: DISCONTINUED | OUTPATIENT
Start: 2024-06-06 | End: 2024-06-08 | Stop reason: HOSPADM

## 2024-06-06 RX ORDER — ONDANSETRON 8 MG/1
8 TABLET, ORALLY DISINTEGRATING ORAL EVERY 8 HOURS PRN
Status: DISCONTINUED | OUTPATIENT
Start: 2024-06-06 | End: 2024-06-06

## 2024-06-06 RX ORDER — METHYLERGONOVINE MALEATE 0.2 MG/ML
200 INJECTION INTRAVENOUS ONCE AS NEEDED
Status: DISCONTINUED | OUTPATIENT
Start: 2024-06-06 | End: 2024-06-06

## 2024-06-06 RX ADMIN — SODIUM CHLORIDE, SODIUM LACTATE, POTASSIUM CHLORIDE, CALCIUM CHLORIDE 2 MILLI-UNITS/MIN: 600; 310; 30; 20 INJECTION, SOLUTION INTRAVENOUS at 09:06

## 2024-06-06 RX ADMIN — SODIUM CHLORIDE, POTASSIUM CHLORIDE, SODIUM LACTATE AND CALCIUM CHLORIDE: 600; 310; 30; 20 INJECTION, SOLUTION INTRAVENOUS at 10:06

## 2024-06-06 RX ADMIN — IBUPROFEN 600 MG: 600 TABLET ORAL at 11:06

## 2024-06-06 RX ADMIN — SODIUM CHLORIDE, SODIUM LACTATE, POTASSIUM CHLORIDE, CALCIUM CHLORIDE 2 MILLI-UNITS/MIN: 600; 310; 30; 20 INJECTION, SOLUTION INTRAVENOUS at 01:06

## 2024-06-06 RX ADMIN — ACETAMINOPHEN 1000 MG: 500 TABLET ORAL at 11:06

## 2024-06-06 RX ADMIN — OXYCODONE 5 MG: 5 TABLET ORAL at 03:06

## 2024-06-06 RX ADMIN — DEXTROSE MONOHYDRATE 1 G: 50 INJECTION, SOLUTION INTRAVENOUS at 09:06

## 2024-06-06 RX ADMIN — SODIUM CHLORIDE, POTASSIUM CHLORIDE, SODIUM LACTATE AND CALCIUM CHLORIDE: 600; 310; 30; 20 INJECTION, SOLUTION INTRAVENOUS at 01:06

## 2024-06-06 RX ADMIN — SODIUM CHLORIDE, POTASSIUM CHLORIDE, SODIUM LACTATE AND CALCIUM CHLORIDE 1000 ML: 600; 310; 30; 20 INJECTION, SOLUTION INTRAVENOUS at 08:06

## 2024-06-06 RX ADMIN — OXYCODONE 5 MG: 5 TABLET ORAL at 08:06

## 2024-06-06 RX ADMIN — SODIUM CHLORIDE, SODIUM LACTATE, POTASSIUM CHLORIDE, CALCIUM CHLORIDE 999 MILLI-UNITS/MIN: 600; 310; 30; 20 INJECTION, SOLUTION INTRAVENOUS at 02:06

## 2024-06-06 RX ADMIN — IBUPROFEN 600 MG: 600 TABLET ORAL at 06:06

## 2024-06-06 RX ADMIN — CEFAZOLIN 2 G: 2 INJECTION, POWDER, FOR SOLUTION INTRAMUSCULAR; INTRAVENOUS at 01:06

## 2024-06-06 NOTE — NURSING
MD Koroma notified of prolonged decel and variables. Pit off, O2 on, FSE applied, and LR bolusing.

## 2024-06-06 NOTE — NURSING
Report given to Regina Diop RN.     1640- Pt transferred to room 241. VSS, vaginal bleeding light. NAD noted. Care relinquished by this nurse.

## 2024-06-06 NOTE — ANESTHESIA PREPROCEDURE EVALUATION
06/06/2024    Pre-operative evaluation for * No surgery found *    Gurdeep Harris is a 28 y.o. female     Patient Active Problem List   Diagnosis    Iron deficiency anemia, unspecified    Pre-eclampsia affecting childbirth    Acute blood loss anemia    Suspected fetal abnormality affecting management of mother, antepartum, not applicable or unspecified fetus       Review of patient's allergies indicates:  No Known Allergies    No current facility-administered medications on file prior to encounter.     Current Outpatient Medications on File Prior to Encounter   Medication Sig Dispense Refill    amLODIPine (NORVASC) 5 MG tablet Take 1 tablet (5 mg total) by mouth once daily. 30 tablet 11    aspirin (ECOTRIN) 81 MG EC tablet Take 1 tablet (81 mg total) by mouth once daily. (Patient not taking: Reported on 4/12/2024)  0    famotidine (PEPCID) 20 MG tablet Take 1 tablet (20 mg total) by mouth 2 (two) times daily. (Patient not taking: Reported on 4/12/2024) 60 tablet 4    ferrous sulfate (FEOSOL) 325 mg (65 mg iron) Tab tablet Take 1 tablet (325 mg total) by mouth 2 (two) times daily. (Patient not taking: Reported on 4/12/2024) 60 tablet 11    ondansetron (ZOFRAN) 4 MG tablet Take 1 tablet (4 mg total) by mouth every 6 (six) hours as needed for Nausea. (Patient not taking: Reported on 4/12/2024) 30 tablet 4    triamcinolone acetonide 0.025% (KENALOG) 0.025 % cream Apply topically 2 (two) times daily. (Patient not taking: Reported on 4/12/2024) 80 g 1    TRINATAL RX 1 60 mg iron-1 mg Tab Take 1 tablet by mouth.      [DISCONTINUED] labetaloL (NORMODYNE) 200 MG tablet Take 1 tablet (200 mg total) by mouth 2 (two) times daily. (Patient not taking: No sig reported) 60 tablet 11           CBC:   Recent Labs     06/06/24  0100   WBC 7.10   RBC 4.31   HGB 10.1*   HCT 32.5*      MCV 75*   MCH 23.4*   MCHC 31.1*          Pre-op Assessment    I have reviewed the Patient Summary Reports.    I have reviewed the NPO Status.      Review of Systems  Anesthesia Hx:  No problems with previous Anesthesia             Denies Family Hx of Anesthesia complications.    Denies Personal Hx of Anesthesia complications.                    Social:  Non-Smoker       Hematology/Oncology:  Hematology Normal   Oncology Normal                                   EENT/Dental:  EENT/Dental Normal           Cardiovascular:     Hypertension                                        Pulmonary:  Pulmonary Normal                       Renal/:  Renal/ Normal                 Hepatic/GI:  Hepatic/GI Normal                 Musculoskeletal:  Musculoskeletal Normal                Neurological:  Neurology Normal                                      Endocrine:  Endocrine Normal            Dermatological:  Skin Normal    Psych:  Psychiatric Normal                    Physical Exam  General: Well nourished, Cooperative, Alert and Oriented    Airway:  Mouth Opening: Normal  TM Distance: Normal  Tongue: Normal    Dental:  Intact    Chest/Lungs:  Normal Respiratory Rate        Anesthesia Plan  Type of Anesthesia, risks & benefits discussed:    Anesthesia Type: Epidural  Intra-op Monitoring Plan: Standard ASA Monitors  Informed Consent: Informed consent signed with the Patient and all parties understand the risks and agree with anesthesia plan.  All questions answered.   ASA Score: 2    Ready For Surgery From Anesthesia Perspective.     .

## 2024-06-06 NOTE — PROGRESS NOTES
06/06/24 0845   TeleStork Steve Note - Strip   Strip Reviewed by Steve Nurse? Yes   Tele[facility] Steve Note - Communication   [place] Nurse Communicated with Bedside Nurse Regarding: Fetal Status  (nurse in room)

## 2024-06-06 NOTE — ANESTHESIA PROCEDURE NOTES
Epidural    Patient location during procedure: OB   Reason for block: primary anesthetic   Reason for block: labor analgesia requested by patient and obstetrician  Diagnosis: IUP Active Labor   Start time: 6/6/2024 8:00 AM  Timeout: 6/6/2024 7:56 AM  End time: 6/6/2024 8:06 AM    Staffing  Performing Provider: Marcela Dean MD  Authorizing Provider: Marcela Dean MD    Staffing  Performed by: Marcela Dean MD  Authorized by: Marcela Dean MD        Preanesthetic Checklist  Completed: patient identified, IV checked, site marked, risks and benefits discussed, surgical consent, monitors and equipment checked, pre-op evaluation, timeout performed, anesthesia consent given, hand hygiene performed and patient being monitored  Preparation  Patient position: sitting  Prep: ChloraPrep  Patient monitoring: Pulse Ox and Blood Pressure  Reason for block: primary anesthetic   Epidural  Skin Anesthetic: lidocaine 1%  Skin Wheal: 3 mL  Administration type: single shot  Approach: midline  Interspace: L3-4    Injection technique: CHIO saline  Needle and Epidural Catheter  Needle type: Tuohy   Needle gauge: 17  Needle length: 3.5 inches  Needle insertion depth: 4.5 cm  Catheter type: springwound and multi-orifice  Catheter size: 19 G  Catheter at skin depth: 10 cm  Insertion Attempts: 1  Test dose: 3 mL of lidocaine 1.5% with Epi 1-to-200,000  Additional Documentation: incremental injection, no paresthesia on injection, negative aspiration for heme and CSF, no signs/symptoms of IV or SA injection, no significant complaints from patient and no significant pain on injection  Needle localization: anatomical landmarks  Medications:  Volume per aspiration: 4 mL  Time between aspirations: 2 minutes   Assessment  Upper dermatomal levels - Left: T10  Right: T10   Dermatomal levels determined by alcohol wipe  Ease of block: easy  Patient's tolerance of the procedure: comfortable throughout block No inadvertent dural  puncture with Tuohy.  Dural puncture not performed with spinal needle               30-Jan-2023 15:30

## 2024-06-06 NOTE — L&D DELIVERY NOTE
[facility] - Labor & Delivery  Vaginal Delivery   Operative Note    SUMMARY     Normal spontaneous vaginal delivery of live infant, was placed on mothers abdomen for skin to skin and bulb suctioning performed.  Infant delivered position MARK over intact perineum.  Nuchal cord: Yes, cord reduced at perineum.    Spontaneous delivery of placenta and IV pitocin given noting good uterine tone.  No lacerations noted.  Patient tolerated delivery well. Sponge needle and lap counted correctly x2.    Indications:  (spontaneous vaginal delivery)  Pregnancy complicated by:   Patient Active Problem List   Diagnosis    Iron deficiency anemia, unspecified    Pre-eclampsia affecting childbirth    Acute blood loss anemia    Suspected fetal abnormality affecting management of mother, antepartum, not applicable or unspecified fetus     (spontaneous vaginal delivery)     Admitting GA: 38w1d    Delivery Information for Miriam Harris    Birth information:  YOB: 2023   Time of birth: 2:06 PM   Sex: female   Head Delivery Date/Time: 2024  2:06 PM   Delivery type: Vaginal, Spontaneous   Gestational Age: 38w1d        Delivery Providers    Delivering clinician: Yani Koroma MD   Provider Role    Samra Webb RN Delivery Nurse    Roberta Ruiz RN Registered Nurse              Measurements    Weight: 3030 g  Weight (lbs): 6 lb 10.9 oz  Length:          Apgars    Living status: Living  Apgar Component Scores:  1 min.:  5 min.:  10 min.:  15 min.:  20 min.:    Skin color:  0  1       Heart rate:  2  2       Reflex irritability:  2  2       Muscle tone:  2  2       Respiratory effort:  2  2       Total:  8  9       Apgars assigned by: ROBERTA RUIZ RN         Operative Delivery    Forceps attempted?: No  Vacuum extractor attempted?: No         Shoulder Dystocia    Shoulder dystocia present?: No           Presentation    Presentation: Vertex  Position: Right Occiput Anterior           Interventions/Resuscitation     Method: Bulb Suctioning, Tactile Stimulation       Cord    Vessels: 3 vessels  Complications: Nuchal  Nuchal Intervention: reduced  Nuchal Cord Description: loose nuchal cord  Number of Loops: 2  Delayed Cord Clamping?: Yes  Cord Clamped Date/Time: 2024  2:07 PM  Cord Blood Disposition: Sent with Baby  Gases Sent?: No  Stem Cell Collection (by MD): No       Placenta    Placenta delivery date/time: 2024 1410  Placenta removal: Spontaneous  Placenta appearance: Intact  Placenta disposition: Discarded           Labor Events:       labor: No     Labor Onset Date/Time: 2024 08:14     Dilation Complete Date/Time: 2024 13:54     Start Pushing Date/Time: 2024 14:00       Start Pushing Date/Time: 2024 14:00     Rupture Date/Time: 2414         Rupture type: ARM (Artificial Rupture)         Fluid Amount:       Fluid Color: Clear               steroids: None     Antibiotics given for GBS: Yes     Induction: oxytocin     Indications for induction:  Intrauterine Growth Restriction     Augmentation: amniotomy     Indications for augmentation: Ineffective Contraction Pattern     Labor complications: None     Additional complications:          Cervical ripening:                     Delivery:      Episiotomy: None     Indication for Episiotomy:       Perineal Lacerations: None Repaired:      Periurethral Laceration:   Repaired:     Labial Laceration:   Repaired:     Sulcus Laceration:   Repaired:     Vaginal Laceration:   Repaired:     Cervical Laceration:   Repaired:     Repair suture: None     Repair # of packets:       Last Value - EBL - Nursing (mL):       Sum - EBL - Nursing (mL): 0     Last Value - EBL - Anesthesia (mL):      Calculated QBL (mL): 100      Running total QBL (mL): 100      Vaginal Sweep Performed: Yes     Surgicount Correct: Yes     Vaginal Packing: No Quantity:       Other providers:       Anesthesia    Method: Epidural          Details (if  applicable):  Trial of Labor      Categorization:      Priority:     Indications for :     Incision Type:       Additional  information:  Forceps:    Vacuum:    Breech:    Observed anomalies    Other (Comments):

## 2024-06-06 NOTE — PROGRESS NOTES
[facility] [place] - Labor & Delivery  Obstetrics  Labor Progress Note    Patient Name: Gurdeep Harris  MRN: [id number]  Admission Date: 2024  Hospital Length of Stay: 0 days  Attending Physician: Yani Koroma MD  Primary Care Provider: Rajeev Bernal MD    Subjective:     Principal Problem:<principal problem not specified>    Interval History:  Gurdeep is a 28 y.o.  at 38w1d. She is doing well. Feeling contractions.    Objective:     Vital Signs (Most Recent):  Temp: 97.6 °F (36.4 °C) (24 07)  Pulse: 86 (24)  Resp: 20 (24)  BP: 127/82 (24)  SpO2: 100 % (24) Vital Signs (24h Range):  Temp:  [97.6 °F (36.4 °C)-98.2 °F (36.8 °C)] 97.6 °F (36.4 °C)  Pulse:  [66-91] 86  Resp:  [20] 20  SpO2:  [79 %-100 %] 100 %  BP: (111-160)/(68-96) 127/82     Weight: 74.4 kg (164 lb)  Body mass index is 32.03 kg/m².    FHT: 130 Cat 2 (reassuring)  TOCO:  Q 2-3 minutes    Physical Exam    Cervical Exam:  Dilation:  4  Effacement:  50%  Station: -2  Presentation: Vertex     Significant Labs:  Lab Results   Component Value Date    GROUPTRH O POS 2024    HEPBSAG Non-reactive 2024       I have personallly reviewed all pertinent lab results from the last 24 hours.    Assessment/Plan:     28 y.o. female  at 38w1d for induction of labor secondary to IUGR:      - AROM with clear fluid.  - Continue pitocin  - anticipate .    Yani Koroma MD  Obstetrics  [facility] [place] - Labor & Delivery

## 2024-06-06 NOTE — HOSPITAL COURSE
2024  performed.  No lacerations.  2024 Post partum day #1.  Doing well.  Pain well controlled.  Normal lochia.  Bottle feeding.  Ambulating and voiding without difficulty.  2024: PPD#2 s/p . Patient reports that she is doing well. She is ambulating, voiding, and tolerating PO. She reports that she is bottle feeding her female infant.

## 2024-06-06 NOTE — PLAN OF CARE
Problem: Labor  Goal: Hemostasis  Outcome: Met  Goal: Stable Fetal Wellbeing  Outcome: Met  Goal: Effective Progression to Delivery  Outcome: Met  Goal: Absence of Infection Signs and Symptoms  Outcome: Met  Goal: Acceptable Pain Control  Outcome: Met  Goal: Normal Uterine Contraction Pattern  Outcome: Met

## 2024-06-06 NOTE — NURSING
Pt is  at 38 weeks 1days arrived to L&D unit for scheduled induction of labor. Care assumed. Full assessment done, history and medications reviewed with pt. Pt gowned, EFM, TOCO, automatic b/p and pulse ox applied. Pt and family updated on plan of care with questions answered. Bed in low locked position, call bell in reach.

## 2024-06-07 ENCOUNTER — PATIENT MESSAGE (OUTPATIENT)
Dept: OBSTETRICS AND GYNECOLOGY | Facility: CLINIC | Age: 29
End: 2024-06-07

## 2024-06-07 LAB
BASOPHILS # BLD AUTO: 0.05 K/UL (ref 0–0.2)
BASOPHILS NFR BLD: 0.5 % (ref 0–1.9)
DIFFERENTIAL METHOD BLD: ABNORMAL
EOSINOPHIL # BLD AUTO: 0.3 K/UL (ref 0–0.5)
EOSINOPHIL NFR BLD: 2.5 % (ref 0–8)
ERYTHROCYTE [DISTWIDTH] IN BLOOD BY AUTOMATED COUNT: 23.2 % (ref 11.5–14.5)
HCT VFR BLD AUTO: 32.2 % (ref 37–48.5)
HGB BLD-MCNC: 9.6 G/DL (ref 12–16)
IMM GRANULOCYTES # BLD AUTO: 0.03 K/UL (ref 0–0.04)
IMM GRANULOCYTES NFR BLD AUTO: 0.3 % (ref 0–0.5)
LYMPHOCYTES # BLD AUTO: 2.2 K/UL (ref 1–4.8)
LYMPHOCYTES NFR BLD: 20.3 % (ref 18–48)
MCH RBC QN AUTO: 23.9 PG (ref 27–31)
MCHC RBC AUTO-ENTMCNC: 29.8 G/DL (ref 32–36)
MCV RBC AUTO: 80 FL (ref 82–98)
MONOCYTES # BLD AUTO: 1.1 K/UL (ref 0.3–1)
MONOCYTES NFR BLD: 10.7 % (ref 4–15)
NEUTROPHILS # BLD AUTO: 7 K/UL (ref 1.8–7.7)
NEUTROPHILS NFR BLD: 65.7 % (ref 38–73)
NRBC BLD-RTO: 0 /100 WBC
PLATELET # BLD AUTO: 289 K/UL (ref 150–450)
PMV BLD AUTO: 10 FL (ref 9.2–12.9)
RBC # BLD AUTO: 4.02 M/UL (ref 4–5.4)
WBC # BLD AUTO: 10.64 K/UL (ref 3.9–12.7)

## 2024-06-07 PROCEDURE — 85025 COMPLETE CBC W/AUTO DIFF WBC: CPT | Performed by: OBSTETRICS & GYNECOLOGY

## 2024-06-07 PROCEDURE — 36415 COLL VENOUS BLD VENIPUNCTURE: CPT | Performed by: OBSTETRICS & GYNECOLOGY

## 2024-06-07 PROCEDURE — 99231 SBSQ HOSP IP/OBS SF/LOW 25: CPT | Mod: ,,, | Performed by: OBSTETRICS & GYNECOLOGY

## 2024-06-07 PROCEDURE — 11000001 HC ACUTE MED/SURG PRIVATE ROOM

## 2024-06-07 PROCEDURE — 25000003 PHARM REV CODE 250: Performed by: OBSTETRICS & GYNECOLOGY

## 2024-06-07 RX ORDER — IBUPROFEN 800 MG/1
800 TABLET ORAL EVERY 8 HOURS PRN
Qty: 60 TABLET | Refills: 2 | Status: SHIPPED | OUTPATIENT
Start: 2024-06-07 | End: 2025-06-07

## 2024-06-07 RX ORDER — OXYCODONE HYDROCHLORIDE 5 MG/1
5 TABLET ORAL EVERY 4 HOURS PRN
Qty: 20 TABLET | Refills: 0 | Status: SHIPPED | OUTPATIENT
Start: 2024-06-07

## 2024-06-07 RX ORDER — ACETAMINOPHEN 500 MG
1000 TABLET ORAL EVERY 6 HOURS PRN
Qty: 100 TABLET | Refills: 2 | Status: SHIPPED | OUTPATIENT
Start: 2024-06-07 | End: 2025-06-07

## 2024-06-07 RX ORDER — DOCUSATE SODIUM 100 MG/1
100 CAPSULE, LIQUID FILLED ORAL 2 TIMES DAILY
Qty: 60 CAPSULE | Refills: 1 | Status: SHIPPED | OUTPATIENT
Start: 2024-06-07 | End: 2025-06-07

## 2024-06-07 RX ADMIN — OXYCODONE 5 MG: 5 TABLET ORAL at 10:06

## 2024-06-07 RX ADMIN — IBUPROFEN 600 MG: 600 TABLET ORAL at 06:06

## 2024-06-07 RX ADMIN — IBUPROFEN 600 MG: 600 TABLET ORAL at 10:06

## 2024-06-07 RX ADMIN — OXYCODONE 5 MG: 5 TABLET ORAL at 08:06

## 2024-06-07 RX ADMIN — ACETAMINOPHEN 1000 MG: 500 TABLET ORAL at 04:06

## 2024-06-07 RX ADMIN — IBUPROFEN 600 MG: 600 TABLET ORAL at 12:06

## 2024-06-07 RX ADMIN — PRENATAL VIT W/ FE FUMARATE-FA TAB 27-0.8 MG 1 TABLET: 27-0.8 TAB at 08:06

## 2024-06-07 RX ADMIN — OXYCODONE 5 MG: 5 TABLET ORAL at 04:06

## 2024-06-07 NOTE — PLAN OF CARE
Problem: Adult Inpatient Plan of Care  Goal: Plan of Care Review  Outcome: Progressing  Goal: Patient-Specific Goal (Individualized)  Outcome: Progressing  Goal: Absence of Hospital-Acquired Illness or Injury  Outcome: Progressing     Problem:  Fall Injury Risk  Goal: Absence of Fall, Infant Drop and Related Injury  Outcome: Progressing     Problem: Infection  Goal: Absence of Infection Signs and Symptoms  Outcome: Progressing     Problem: Postpartum (Vaginal Delivery)  Goal: Successful Parent Role Transition  Outcome: Progressing  Goal: Hemostasis  Outcome: Met  Goal: Absence of Infection Signs and Symptoms  Outcome: Progressing  Goal: Anesthesia/Sedation Recovery  Outcome: Met  Goal: Optimal Pain Control and Function  Outcome: Progressing  Goal: Effective Urinary Elimination  Outcome: Progressing

## 2024-06-07 NOTE — PROGRESS NOTES
[facility] [place] [facility]  Obstetrics  Postpartum Progress Note    Patient Name: Gurdeep Harris  MRN: [id number]  Admission Date: 2024  Hospital Length of Stay: 1 days  Attending Physician: Yani Koroma MD  Primary Care Provider: Rajeev Bernal MD    Subjective:     Principal Problem: (spontaneous vaginal delivery)    Hospital Course:  2024  performed.  No lacerations.  2024 Post partum day #1.  Doing well.  Pain well controlled.  Normal lochia.  Bottle feeding.  Ambulating and voiding without difficulty.    She is doing well this morning. She is tolerating a regular diet without nausea or vomiting. She is voiding spontaneously. She is ambulating. She has passed flatus, and has not a BM. Vaginal bleeding is mild. She denies fever or chills. Abdominal pain is mild and controlled with oral medications. She Is not breastfeeding. She desires circumcision for her male baby: not applicable.    Objective:     Vital Signs (Most Recent):  Temp: 98.3 °F (36.8 °C) (24)  Pulse: 67 (24)  Resp: 18 (24 08)  BP: 132/89 (24)  SpO2: 99 % (24) Vital Signs (24h Range):  Temp:  [98 °F (36.7 °C)-99 °F (37.2 °C)] 98.3 °F (36.8 °C)  Pulse:  [] 67  Resp:  [16-20] 18  SpO2:  [88 %-100 %] 99 %  BP: (101-135)/() 132/89     Weight: 74.4 kg (164 lb)  Body mass index is 32.03 kg/m².      Intake/Output Summary (Last 24 hours) at 2024 0959  Last data filed at 2024 0645  Gross per 24 hour   Intake 2500.33 ml   Output 900 ml   Net 1600.33 ml         Significant Labs:  Lab Results   Component Value Date    GROUPTRH O POS 2024    HEPBSAG Non-reactive 2024     Recent Labs   Lab 24  0508   HGB 9.6*   HCT 32.2*       I have personallly reviewed all pertinent lab results from the last 24 hours.    Physical Exam:   Constitutional: She is oriented to person, place, and time. She appears well-developed. No distress.    HENT:   Head:  Normocephalic and atraumatic.    Eyes: EOM are normal.     Cardiovascular:  Normal rate.             Pulmonary/Chest: Effort normal.        Abdominal: Soft. She exhibits no distension and no mass (fundus firm and below the umbilicus). There is no abdominal tenderness.             Musculoskeletal: Normal range of motion.       Neurological: She is oriented to person, place, and time.    Skin: Skin is warm. No rash noted.    Psychiatric: She has a normal mood and affect.       Review of Systems  Assessment/Plan:     28 y.o. female  for:    *  (spontaneous vaginal delivery)  - routine postpartum care.        Disposition: As patient meets milestones, will plan to discharge tomorrow.    Yani Koroma MD  Obstetrics  [facility] - Mother [facility] Baby

## 2024-06-07 NOTE — ANESTHESIA POSTPROCEDURE EVALUATION
Anesthesia Post Evaluation    Patient: Gurdeep Harris    Procedure(s) Performed: * No procedures listed *    Final Anesthesia Type: epidural      Patient location during evaluation: labor & delivery  Patient participation: Yes- Able to Participate  Level of consciousness: awake and alert and oriented  Post-procedure vital signs: reviewed and stable  Pain management: adequate  Airway patency: patent    PONV status at discharge: No PONV  Anesthetic complications: no      Cardiovascular status: blood pressure returned to baseline, stable and hemodynamically stable  Respiratory status: unassisted, room air and spontaneous ventilation  Hydration status: euvolemic  Follow-up not needed.              Vitals Value Taken Time   /89 06/07/24 0744   Temp 36.8 °C (98.3 °F) 06/07/24 0744   Pulse 67 06/07/24 0744   Resp 18 06/07/24 0841   SpO2 99 % 06/07/24 0744         No case tracking events are documented in the log.      Pain/Austin Score: Pain Rating Prior to Med Admin: 9 (6/7/2024  8:41 AM)  Pain Rating Post Med Admin: 7 (6/7/2024  9:30 AM)

## 2024-06-08 VITALS
HEIGHT: 60 IN | OXYGEN SATURATION: 98 % | DIASTOLIC BLOOD PRESSURE: 79 MMHG | WEIGHT: 164 LBS | TEMPERATURE: 98 F | RESPIRATION RATE: 16 BRPM | BODY MASS INDEX: 32.2 KG/M2 | HEART RATE: 81 BPM | SYSTOLIC BLOOD PRESSURE: 126 MMHG

## 2024-06-08 PROCEDURE — 99238 HOSP IP/OBS DSCHRG MGMT 30/<: CPT | Mod: ,,, | Performed by: OBSTETRICS & GYNECOLOGY

## 2024-06-08 PROCEDURE — 25000003 PHARM REV CODE 250: Performed by: OBSTETRICS & GYNECOLOGY

## 2024-06-08 RX ADMIN — PRENATAL VIT W/ FE FUMARATE-FA TAB 27-0.8 MG 1 TABLET: 27-0.8 TAB at 09:06

## 2024-06-08 RX ADMIN — OXYCODONE 5 MG: 5 TABLET ORAL at 04:06

## 2024-06-08 RX ADMIN — IBUPROFEN 600 MG: 600 TABLET ORAL at 01:06

## 2024-06-08 RX ADMIN — IBUPROFEN 600 MG: 600 TABLET ORAL at 06:06

## 2024-06-08 RX ADMIN — OXYCODONE 5 MG: 5 TABLET ORAL at 08:06

## 2024-06-08 NOTE — PLAN OF CARE
Plan of care discussed with patient and significant other.  Patient and significant other verbalized understanding of plan of care and of their questions were answered.

## 2024-06-08 NOTE — DISCHARGE INSTRUCTIONS
After a Vaginal Birth    General Discharge Instructions    May follow a regular diet, unless otherwise discussed with physician.  Take showers, not baths unless otherwise discussed with physician.  Activity as tolerated.  No lifting or heavy exercise for 6 weeks, no driving for 2 weeks, no sexual intercourse, douching or tampons for 6 weeks  May return to work/school as discussed with physician  Take medications as directed  Discuss birth control with physician  Breast care support bra worn at all times  Lactation consultant referral number ( [phone] or [phone])           Call Your Healthcare Provider Right Away If You Have:  A temperature of 100.4°F or higher.  If your blood pressure is over 140/90.  You have difficulty catching your breath or trouble breathing.  Heavy vaginal bleeding, clots, or vaginal discharge with foul odor. (heavier than menses)  Persistent nausea or vomiting.  You gain more than 3 pounds in 3 days.  Severe headaches not relieved by Tylenol (acetaminophen) or Motrin (ibuprofen)  Blurry or double vision, see spots or flashing lights.  Dizziness or fainting.  New onset swelling or worsening of existing swelling.  Burning or pain when you urinate.  No bowel movement for 5 days.  Redness, warmth, swelling, or pain in the lower leg.  Redness, discharge, or pain worse than you had in the hospital.  Burning, pain, red streaks, or lumpy areas in your breasts.  Cracks, blisters, or blood on your nipples.  Feelings of extreme sadness or anxiety, or a feeling that you dont want to be with your baby.  If you have any new or unusual symptoms or have questions or concerns    Some of these symptoms can occur up to 4 to 6 weeks after delivery. This can be a sign of pre-eclampsia, which is a serious disease that can cause stroke, seizures, organ damage, or death. Do not wait to call your doctor or seek medical attention.    If You Had Stitches  You may have received stitches in the skin near your  vagina. The stitches might have closed an episiotomy (an incision that enlarges the opening of the vagina). Or you may have needed stitches to repair torn skin. Either way, your stitches should dissolve within weeks. Until then, you can help reduce discomfort, aid healing, and reduce your risk of infection by keeping the stitches clean. These tips can help:    Gently wipe from front to back after you urinate or have a bowel movement.  After wiping, spray warm water on the area. Or you can have a sitz bath. This means sitting in a tub with a few inches of water in it. Then pat the area dry or use a hairdryer on a cool setting.  You can take a shower instead of a bath.  Change sanitary pads at least every 2-4 hours.  Place cold or heat packs on the area as directed by your doctors or nurses. Keep a thin towel between the pack and your skin.  Sit on firm seats so the stitches pull less.     Follow-Up  Schedule a  follow-up exam with your healthcare provider for about 6 weeks after delivery. During this exam, your uterus and vaginal area will be checked. Contact your healthcare provider if you think you or your baby are having any problems.

## 2024-06-09 NOTE — DISCHARGE SUMMARY
[facility]  Obstetrics  Discharge Summary      Patient Name: Gurdeep Harris  MRN: [id number]  Admission Date: 2024  Hospital Length of Stay: 2 days  Discharge Date and Time: 2024  2:00 PM  Attending Physician: No att. providers found   Discharging Provider: Belén Prajapati MD   Primary Care Provider: Rajeev Bernal MD    HPI: Patient admitted for induction of labor secondary to IUGR.          * No surgery found *     Hospital Course:   2024  performed.  No lacerations.  2024 Post partum day #1.  Doing well.  Pain well controlled.  Normal lochia.  Bottle feeding.  Ambulating and voiding without difficulty.  2024: PPD#2 s/p . Patient reports that she is doing well. She is ambulating, voiding, and tolerating PO. She reports that she is bottle feeding her female infant.         Final Active Diagnoses:    Diagnosis Date Noted POA    PRINCIPAL PROBLEM:   (spontaneous vaginal delivery) [O80] 2024 Not Applicable      Problems Resolved During this Admission:        Significant Diagnostic Studies:  Lab Results   Component Value Date    WBC 10.64 2024    HGB 9.6 (L) 2024    HCT 32.2 (L) 2024    MCV 80 (L) 2024     2024         Feeding Method: bottle    Immunizations       Date Immunization Status Dose Route/Site Given by    24 1118 MMR Deferred 0.5 mL Subcutaneous/ Myrna Vieyra RN    24 1119 Tdap Deferred 0.5 mL Intramuscular/ Myrna Vieyra RN            Delivery:    Episiotomy: None   Lacerations: None   Repair suture: None   Repair # of packets:     Blood loss (ml):       Birth information:  YOB: 2023   Time of birth: 2:06 PM   Sex: female   Delivery type: Vaginal, Spontaneous   Gestational Age: 38w1d     Measurements    Weight: 3030 g  Weight (lbs): 6 lb 10.9 oz  Length:          Delivery Clinician: Delivery Providers    Delivering clinician: Yani Koroma MD   Provider Role     Webb, Samra, RN Delivery Nurse    Taurus Sow RN Registered Nurse             Additional  information:  Forceps:    Vacuum:    Breech:    Observed anomalies      Living?:     Apgars    Living status: Living  Apgar Component Scores:  1 min.:  5 min.:  10 min.:  15 min.:  20 min.:    Skin color:  0  1       Heart rate:  2  2       Reflex irritability:  2  2       Muscle tone:  2  2       Respiratory effort:  2  2       Total:  8  9       Apgars assigned by: TAURUS SOW RN         Placenta: Delivered:       appearance  Pending Diagnostic Studies:       None            Discharged Condition: good    Disposition: Home or Self Care    Follow Up:   Follow-up Information       Yani Koroma MD. Schedule an appointment as soon as possible for a visit in 6 week(s).    Specialty: Obstetrics and Gynecology  Why: For wound re-check  Contact information:  [address] [place] [address] [phone]                           Patient Instructions:      Diet Adult Regular     Pelvic Rest     Lifting restrictions     Notify your health care provider if you experience any of the following:  temperature >100.4     Notify your health care provider if you experience any of the following:  persistent nausea and vomiting or diarrhea     Notify your health care provider if you experience any of the following:  severe uncontrolled pain     Notify your health care provider if you experience any of the following:  difficulty breathing or increased cough     Notify your health care provider if you experience any of the following:  severe persistent headache     Notify your health care provider if you experience any of the following:  worsening rash     Notify your health care provider if you experience any of the following:  persistent dizziness, light-headedness, or visual disturbances     Notify your health care provider if you experience any of the following:  increased confusion or weakness     Activity as tolerated      Weight bearing restrictions (specify):     Medications:  Discharge Medication List as of 6/8/2024 11:24 AM        START taking these medications    Details   acetaminophen (TYLENOL EXTRA STRENGTH) 500 MG tablet Take 2 tablets (1,000 mg total) by mouth every 6 (six) hours as needed for Pain., Starting Fri 6/7/2024, Until Sat 6/7/2025 at 2359, Normal      docusate sodium (COLACE) 100 MG capsule Take 1 capsule (100 mg total) by mouth 2 (two) times daily., Starting Fri 6/7/2024, Until Sat 6/7/2025, Normal      ibuprofen (ADVIL,MOTRIN) 800 MG tablet Take 1 tablet (800 mg total) by mouth every 8 (eight) hours as needed for Pain., Starting Fri 6/7/2024, Until Sat 6/7/2025 at 2359, Normal      oxyCODONE (ROXICODONE) 5 MG immediate release tablet Take 1 tablet (5 mg total) by mouth every 4 (four) hours as needed for Pain., Starting Fri 6/7/2024, Normal           CONTINUE these medications which have NOT CHANGED    Details   amLODIPine (NORVASC) 5 MG tablet Take 1 tablet (5 mg total) by mouth once daily., Starting Fri 5/17/2024, Until Sat 5/17/2025, Normal      TRINATAL RX 1 60 mg iron-1 mg Tab Take 1 tablet by mouth., Starting Wed 12/13/2023, Historical Med           STOP taking these medications       aspirin (ECOTRIN) 81 MG EC tablet Comments:   Reason for Stopping:         famotidine (PEPCID) 20 MG tablet Comments:   Reason for Stopping:         ferrous sulfate (FEOSOL) 325 mg (65 mg iron) Tab tablet Comments:   Reason for Stopping:         ondansetron (ZOFRAN) 4 MG tablet Comments:   Reason for Stopping:         triamcinolone acetonide 0.025% (KENALOG) 0.025 % cream Comments:   Reason for Stopping:               Belén Prajapati MD  Obstetrics  [facility]

## 2024-06-09 NOTE — PROGRESS NOTES
[facility]  Obstetrics  Postpartum Progress Note    Patient Name: Gurdeep Harris  MRN: [id number]  Admission Date: 2024  Hospital Length of Stay: 2 days  Attending Physician: No att. providers found  Primary Care Provider: Rajeev Bernal MD    Subjective:     Principal Problem: (spontaneous vaginal delivery)    Hospital Course:  2024  performed.  No lacerations.  2024 Post partum day #1.  Doing well.  Pain well controlled.  Normal lochia.  Bottle feeding.  Ambulating and voiding without difficulty.  2024: PPD#2 s/p . Patient reports that she is doing well. She is ambulating, voiding, and tolerating PO. She reports that she is bottle feeding her female infant.    Interval History:   She is doing well this morning. She is tolerating a regular diet without nausea or vomiting. She is voiding spontaneously. She is ambulating. She has passed flatus, and has not a BM. Vaginal bleeding is mild. She denies fever or chills. Abdominal pain is mild and controlled with oral medications. She Is not breastfeeding. She desires circumcision for her male baby: not applicable.    Objective:     Vital Signs (Most Recent):  Temp: 97.7 °F (36.5 °C) (24)  Pulse: 81 (24)  Resp: 16 (24 08)  BP: 126/79 (24)  SpO2: 98 % (24) Vital Signs (24h Range):        Weight: 74.4 kg (164 lb)  Body mass index is 32.03 kg/m².    No intake or output data in the 24 hours ending 24 0920      Significant Labs:  Lab Results   Component Value Date    GROUPTRH O POS 2024    HEPBSAG Non-reactive 2024     Lab Results   Component Value Date    WBC 10.64 2024    HGB 9.6 (L) 2024    HCT 32.2 (L) 2024    MCV 80 (L) 2024     2024       I have personallly reviewed all pertinent lab results from the last 24 hours.    Physical Exam:   Constitutional: She is oriented to person, place, and time. She appears  well-developed and well-nourished. No distress.       Cardiovascular:  Normal rate.             Pulmonary/Chest: Effort normal. No respiratory distress.        Abdominal: Soft. She exhibits no distension and no abdominal incision.                 Neurological: She is alert and oriented to person, place, and time.    Skin: Skin is warm and dry.    Psychiatric: She has a normal mood and affect.       Review of Systems   Constitutional:  Negative for chills and fever.   Eyes:  Negative for visual disturbance.   Respiratory:  Negative for cough and wheezing.    Cardiovascular:  Negative for chest pain and palpitations.   Gastrointestinal:  Negative for abdominal pain, nausea and vomiting.   Genitourinary:  Negative for dysuria, frequency, hematuria, pelvic pain, vaginal bleeding, vaginal discharge and vaginal pain.   Neurological:  Negative for headaches.   Psychiatric/Behavioral:  Negative for depression.      Assessment/Plan:     28 y.o. female  for:    *  (spontaneous vaginal delivery)  - routine postpartum care.        Disposition: As patient meets milestones, will plan to discharge today.    Belén Prajapati MD  Obstetrics  [facility] [place] [facility]

## 2024-06-09 NOTE — SUBJECTIVE & OBJECTIVE
Interval History:   She is doing well this morning. She is tolerating a regular diet without nausea or vomiting. She is voiding spontaneously. She is ambulating. She has passed flatus, and has not a BM. Vaginal bleeding is mild. She denies fever or chills. Abdominal pain is mild and controlled with oral medications. She Is not breastfeeding. She desires circumcision for her male baby: not applicable.    Objective:     Vital Signs (Most Recent):  Temp: 97.7 °F (36.5 °C) (06/08/24 0825)  Pulse: 81 (06/08/24 0825)  Resp: 16 (06/08/24 0859)  BP: 126/79 (06/08/24 0825)  SpO2: 98 % (06/08/24 0825) Vital Signs (24h Range):        Weight: 74.4 kg (164 lb)  Body mass index is 32.03 kg/m².    No intake or output data in the 24 hours ending 06/09/24 0920      Significant Labs:  Lab Results   Component Value Date    GROUPTRH O POS 06/06/2024    HEPBSAG Non-reactive 03/28/2024     Lab Results   Component Value Date    WBC 10.64 06/07/2024    HGB 9.6 (L) 06/07/2024    HCT 32.2 (L) 06/07/2024    MCV 80 (L) 06/07/2024     06/07/2024       I have personallly reviewed all pertinent lab results from the last 24 hours.    Physical Exam:   Constitutional: She is oriented to person, place, and time. She appears well-developed and well-nourished. No distress.       Cardiovascular:  Normal rate.             Pulmonary/Chest: Effort normal. No respiratory distress.        Abdominal: Soft. She exhibits no distension and no abdominal incision.                 Neurological: She is alert and oriented to person, place, and time.    Skin: Skin is warm and dry.    Psychiatric: She has a normal mood and affect.       Review of Systems   Constitutional:  Negative for chills and fever.   Eyes:  Negative for visual disturbance.   Respiratory:  Negative for cough and wheezing.    Cardiovascular:  Negative for chest pain and palpitations.   Gastrointestinal:  Negative for abdominal pain, nausea and vomiting.   Genitourinary:  Negative for dysuria,  frequency, hematuria, pelvic pain, vaginal bleeding, vaginal discharge and vaginal pain.   Neurological:  Negative for headaches.   Psychiatric/Behavioral:  Negative for depression.

## 2024-07-15 ENCOUNTER — TELEPHONE (OUTPATIENT)
Dept: OBSTETRICS AND GYNECOLOGY | Facility: CLINIC | Age: 29
End: 2024-07-15
Payer: MEDICAID

## 2024-07-15 NOTE — TELEPHONE ENCOUNTER
Spoke with pt she  states that she feels like she is having some symptoms of depression wanted to be seen sooner than her appt on Friday, declined to see diff provider wants to know if something can be called in to help with depression

## 2024-07-15 NOTE — TELEPHONE ENCOUNTER
----- Message from Erin Ryan sent at 7/15/2024  1:53 PM CDT -----  Regarding: Same Day Appointment                Name of Who is Calling:  Gurdeep Harris    Who Left The Message: Gurdeep Harris      What is the request in detail:     Patient called requesting to be seen today Monday 07/15/2024 for post partum depression and heavy bleeding. I did attempt to schedule per request but was unsuccessful.  Please further advise.    Thank you      Reply by [facility]: YES      Patient's Preferred Call Back    [phone] (m)

## 2024-07-16 ENCOUNTER — PATIENT MESSAGE (OUTPATIENT)
Dept: OBSTETRICS AND GYNECOLOGY | Facility: CLINIC | Age: 29
End: 2024-07-16
Payer: MEDICAID

## 2024-07-17 ENCOUNTER — POSTPARTUM VISIT (OUTPATIENT)
Dept: OBSTETRICS AND GYNECOLOGY | Facility: CLINIC | Age: 29
End: 2024-07-17
Payer: MEDICAID

## 2024-07-17 VITALS — DIASTOLIC BLOOD PRESSURE: 80 MMHG | BODY MASS INDEX: 30.96 KG/M2 | WEIGHT: 158.5 LBS | SYSTOLIC BLOOD PRESSURE: 138 MMHG

## 2024-07-17 DIAGNOSIS — N76.0 BV (BACTERIAL VAGINOSIS): ICD-10-CM

## 2024-07-17 DIAGNOSIS — B96.89 BV (BACTERIAL VAGINOSIS): ICD-10-CM

## 2024-07-17 PROCEDURE — 99211 OFF/OP EST MAY X REQ PHY/QHP: CPT | Mod: PBBFAC | Performed by: OBSTETRICS & GYNECOLOGY

## 2024-07-17 PROCEDURE — 99999 PR PBB SHADOW E&M-EST. PATIENT-LVL I: CPT | Mod: PBBFAC,,, | Performed by: OBSTETRICS & GYNECOLOGY

## 2024-07-17 RX ORDER — FLUOXETINE HYDROCHLORIDE 20 MG/1
20 CAPSULE ORAL DAILY
Qty: 30 CAPSULE | Refills: 2 | Status: SHIPPED | OUTPATIENT
Start: 2024-07-17 | End: 2025-07-17

## 2024-07-17 RX ORDER — METRONIDAZOLE 500 MG/1
500 TABLET ORAL 2 TIMES DAILY
Qty: 14 TABLET | Refills: 0 | Status: SHIPPED | OUTPATIENT
Start: 2024-07-17 | End: 2024-07-24

## 2024-07-17 NOTE — PROGRESS NOTES
Subjective     Patient ID: Gurdeep Harris is a 29 y.o. female.    Chief Complaint:  Postpartum Care      History of Present Illness  HPI  Postpartum Visit  Patient is here for a postpartum visit. She is 6 weeks postpartum following a spontaneous vaginal delivery. I have fully reviewed the prenatal and intrapartum course. The delivery was at 38 gestational weeks. Outcome: spontaneous vaginal delivery. Anesthesia: epidural.  Postpartum course has been uncomplicated. Baby's course has been uneventful. Baby is feeding by bottle. Bleeding no bleeding. Bowel function is normal. Bladder function is normal. Patient is not sexually active. Contraception method is condoms. Postpartum depression screening: positive.     GYN & OB History  Patient's last menstrual period was 2024.   Date of Last Pap: 2022    OB History    Para Term  AB Living   3 3 3 0 0 3   SAB IAB Ectopic Multiple Live Births   0 0 0 0 3      # Outcome Date GA Lbr Clemente/2nd Weight Sex Type Anes PTL Lv   3 Term 24 38w1d 05:40 / 00:12 3.03 kg (6 lb 10.9 oz) F Vag-Spont EPI N MICHAEL   2 Term 21 37w1d  3.23 kg (7 lb 1.9 oz) M Vag-Vacuum EPI N MICHAEL   1 Term 17 38w2d  2.865 kg (6 lb 5.1 oz) F Vag-Spont EPI N MICHEAL       Review of Systems  Review of Systems   Constitutional: Negative.    HENT: Negative.     Eyes: Negative.    Respiratory: Negative.     Cardiovascular: Negative.    Gastrointestinal: Negative.    Endocrine: Negative.    Genitourinary: Negative.    Musculoskeletal: Negative.    Integumentary:  Negative.   Neurological: Negative.    Hematological: Negative.    Psychiatric/Behavioral: Negative.     Breast: negative.           Objective   Physical Exam:   Constitutional: She is oriented to person, place, and time. She appears well-developed.    HENT:   Head: Normocephalic and atraumatic.    Eyes: EOM are normal.     Cardiovascular:  Normal rate.             Pulmonary/Chest: Effort normal.        Abdominal: Soft. There  is no abdominal tenderness.     Genitourinary:    Vagina, uterus, right adnexa and left adnexa normal.      Pelvic exam was performed with patient supine.   The external female genitalia was normal.   No external genitalia lesions identified,Genitalia hair distrobution normal .     Labial bartholins normal.There is no rash, tenderness or lesion on the right labia. There is no rash, tenderness or lesion on the left labia. Cervix is normal. Right adnexum displays no tenderness and no fullness. Left adnexum displays no tenderness and no fullness. No erythema, vaginal discharge or bleeding in the vagina.    No signs of injury in the vagina.   Vagina was moist.Cervix exhibits no motion tenderness. Uterus consistancy normal and Uerus contour normal  Uterus is not enlarged and not tender. Normal urethral meatus.Urethral Meatus exhibits: urethral lesionUrethra findings: no urethral mass, no tenderness and prolapsedBladder findings: no bladder distention and no bladder tenderness          Musculoskeletal: Normal range of motion.       Neurological: She is oriented to person, place, and time.    Skin: Skin is warm.    Psychiatric: She has a normal mood and affect.            Assessment and Plan     1. Postpartum depression    2. BV (bacterial vaginosis)             Plan:  1. Postpartum depression  - FLUoxetine 20 MG capsule; Take 1 capsule (20 mg total) by mouth once daily.  Dispense: 30 capsule; Refill: 2    2. BV (bacterial vaginosis)  - metroNIDAZOLE (FLAGYL) 500 MG tablet; Take 1 tablet (500 mg total) by mouth 2 (two) times daily. for 7 days  Dispense: 14 tablet; Refill: 0    - well recovered.    - follow up prn or for annual exam.

## 2024-07-23 ENCOUNTER — PATIENT MESSAGE (OUTPATIENT)
Dept: OBSTETRICS AND GYNECOLOGY | Facility: CLINIC | Age: 29
End: 2024-07-23
Payer: MEDICAID

## 2024-07-23 DIAGNOSIS — B37.9 YEAST INFECTION: Primary | ICD-10-CM

## 2024-07-25 RX ORDER — FLUCONAZOLE 150 MG/1
150 TABLET ORAL DAILY
Qty: 1 TABLET | Refills: 0 | Status: SHIPPED | OUTPATIENT
Start: 2024-07-25 | End: 2024-07-26

## 2024-07-25 RX ORDER — TERCONAZOLE 4 MG/G
1 CREAM VAGINAL DAILY
Qty: 45 G | Refills: 0 | Status: SHIPPED | OUTPATIENT
Start: 2024-07-25 | End: 2024-07-25

## 2024-09-09 ENCOUNTER — PATIENT MESSAGE (OUTPATIENT)
Dept: OBSTETRICS AND GYNECOLOGY | Facility: CLINIC | Age: 29
End: 2024-09-09
Payer: MEDICAID

## 2024-09-09 DIAGNOSIS — B96.89 BV (BACTERIAL VAGINOSIS): ICD-10-CM

## 2024-09-09 DIAGNOSIS — N76.0 BV (BACTERIAL VAGINOSIS): ICD-10-CM

## 2024-09-09 NOTE — TELEPHONE ENCOUNTER
Refill Routing Note   Medication(s) are not appropriate for processing by [facility] for the following reason(s):        Outside of protocol    [facility] action(s):  Route               Appointments  past 12m or future 3m with PCP    Date Provider   Last Visit   5/31/2024 Yani Koroma MD   Next Visit   Visit date not found Yani Koroma MD   ED visits in past 90 days: 0        Note composed:9:51 AM 09/09/2024

## 2024-09-10 RX ORDER — METRONIDAZOLE 500 MG/1
500 TABLET ORAL 2 TIMES DAILY
Qty: 14 TABLET | Refills: 0 | Status: SHIPPED | OUTPATIENT
Start: 2024-09-10

## 2025-01-10 ENCOUNTER — OFFICE VISIT (OUTPATIENT)
Dept: OBSTETRICS AND GYNECOLOGY | Facility: CLINIC | Age: 30
End: 2025-01-10
Payer: COMMERCIAL

## 2025-01-10 VITALS
BODY MASS INDEX: 31.26 KG/M2 | WEIGHT: 160.06 LBS | DIASTOLIC BLOOD PRESSURE: 86 MMHG | SYSTOLIC BLOOD PRESSURE: 124 MMHG

## 2025-01-10 DIAGNOSIS — Z12.4 ENCOUNTER FOR PAPANICOLAOU SMEAR FOR CERVICAL CANCER SCREENING: ICD-10-CM

## 2025-01-10 DIAGNOSIS — Z01.419 WOMEN'S ANNUAL ROUTINE GYNECOLOGICAL EXAMINATION: Primary | ICD-10-CM

## 2025-01-10 DIAGNOSIS — Z11.3 ENCOUNTER FOR SCREENING EXAMINATION FOR SEXUALLY TRANSMITTED DISEASE: ICD-10-CM

## 2025-01-10 DIAGNOSIS — N76.0 ACUTE VAGINITIS: ICD-10-CM

## 2025-01-10 PROCEDURE — 99999 PR PBB SHADOW E&M-EST. PATIENT-LVL III: CPT | Mod: PBBFAC,,, | Performed by: PHYSICIAN ASSISTANT

## 2025-01-10 RX ORDER — METRONIDAZOLE 500 MG/1
500 TABLET ORAL EVERY 12 HOURS
Qty: 14 TABLET | Refills: 0 | Status: SHIPPED | OUTPATIENT
Start: 2025-01-10 | End: 2025-01-17

## 2025-01-10 RX ORDER — FLUCONAZOLE 150 MG/1
150 TABLET ORAL
Qty: 2 TABLET | Refills: 1 | Status: SHIPPED | OUTPATIENT
Start: 2025-01-10 | End: 2025-01-14

## 2025-01-10 NOTE — PROGRESS NOTES
HISTORY OF PRESENT ILLNESS:    Gurdeep Harris is a 29 y.o. female, , Patient's last menstrual period was 2024.,  presents for a routine exam. She uses condoms for contraception. She does want STD screening.  Reports GYN complaints - green vaginal discharge with odor x 2 weeks. Tried boric acid with some improvement.    The patient participates in regular exercise: no.  The patient does not smoke.  The patient doesn't wear seatbelts.   Pt denies any domestic violence. Yes -  tattoos or blood transfusions    SCREENING:   Pap:    Mammogram: N/A  Colonoscopy: N/A   DEXA:  N/A     GYN FH:  Breast cancer: none  Colon cancer: none  Ovarian cancer: none  Endometrial cancer: none    Past Medical History:   Diagnosis Date    Anemia        History reviewed. No pertinent surgical history.    MEDICATIONS AND ALLERGIES:      Current Outpatient Medications:     acetaminophen (TYLENOL EXTRA STRENGTH) 500 MG tablet, Take 2 tablets (1,000 mg total) by mouth every 6 (six) hours as needed for Pain., Disp: 100 tablet, Rfl: 2    amLODIPine (NORVASC) 5 MG tablet, Take 1 tablet (5 mg total) by mouth once daily., Disp: 30 tablet, Rfl: 11    docusate sodium (COLACE) 100 MG capsule, Take 1 capsule (100 mg total) by mouth 2 (two) times daily., Disp: 60 capsule, Rfl: 1    fluconazole (DIFLUCAN) 150 MG Tab, Take 1 tablet (150 mg total) by mouth every 72 hours. REFILL AND RE-DOSE IF SYMPTOMS RECUR for 2 doses, Disp: 2 tablet, Rfl: 1    FLUoxetine 20 MG capsule, Take 1 capsule (20 mg total) by mouth once daily., Disp: 30 capsule, Rfl: 2    ibuprofen (ADVIL,MOTRIN) 800 MG tablet, Take 1 tablet (800 mg total) by mouth every 8 (eight) hours as needed for Pain., Disp: 60 tablet, Rfl: 2    metroNIDAZOLE (FLAGYL) 500 MG tablet, Take 1 tablet (500 mg total) by mouth every 12 (twelve) hours. for 7 days, Disp: 14 tablet, Rfl: 0    oxyCODONE (ROXICODONE) 5 MG immediate release tablet, Take 1 tablet (5 mg total) by mouth every 4 (four)  hours as needed for Pain., Disp: 20 tablet, Rfl: 0    TRINATAL RX 1 60 mg iron-1 mg Tab, Take 1 tablet by mouth., Disp: , Rfl:     Review of patient's allergies indicates:  No Known Allergies    Social History     Socioeconomic History    Marital status: Single   Tobacco Use    Smoking status: Never     Passive exposure: Never    Smokeless tobacco: Never   Substance and Sexual Activity    Alcohol use: No    Drug use: No    Sexual activity: Yes     Partners: Male     Birth control/protection: None     Social Drivers of Health     Financial Resource Strain: Low Risk  (7/17/2024)    Overall Financial Resource Strain (CARDIA)     Difficulty of Paying Living Expenses: Not hard at all   Food Insecurity: Unknown (7/17/2024)    Hunger Vital Sign     Worried About Running Out of Food in the Last Year: Patient declined     Ran Out of Food in the Last Year: Never true   Transportation Needs: No Transportation Needs (5/21/2020)    PRAPARE - Transportation     Lack of Transportation (Medical): No     Lack of Transportation (Non-Medical): No   Physical Activity: Unknown (7/17/2024)    Exercise Vital Sign     Days of Exercise per Week: 0 days   Stress: Stress Concern Present (7/17/2024)    [employer] [place] [employer] - Occupational Stress Questionnaire     Feeling of Stress : Very much   Housing Stability: Unknown (7/17/2024)    Housing Stability Vital Sign     Unable to Pay for Housing in the Last Year: No       COMPREHENSIVE GYN HISTORY:    PAP History: Denies abnormal Paps.  Infection History: Denies STDs. Denies PID.  Benign History: Denies uterine fibroids. Denies ovarian cysts. Denies endometriosis. Denies other conditions.  Cancer History: Denies cervical cancer. Denies uterine cancer or hyperplasia. Denies ovarian cancer. Denies vulvar cancer or pre-cancer. Denies vaginal cancer or pre-cancer. Denies breast cancer. Denies colon cancer.  Sexual Activity History: Reports currently being sexually  active  Menstrual History: Monthly, mild-moderate.  Contraception:condoms    ROS:  GENERAL: No weight changes. No swelling. No fatigue. No fever.  CARDIOVASCULAR: No chest pain. No shortness of breath. No leg cramps.   NEUROLOGICAL: No headaches. No vision changes.  BREASTS: No pain. No lumps. No discharge.  ABDOMEN: No pain. No nausea. No vomiting. No diarrhea. No constipation.  REPRODUCTIVE: No abnormal bleeding.   VULVA: No pain. No lesions. No itching.  VAGINA: No relaxation. No itching. No odor. No discharge. No lesions.  URINARY: No incontinence. No nocturia. No frequency. No dysuria.    /86   Wt 72.6 kg (160 lb 0.9 oz)   LMP 12/21/2024   BMI 31.26 kg/m²     PE:  APPEARANCE: Well nourished, well developed, in no acute distress.  AFFECT: WNL, alert and oriented x 3.  SKIN: No acne or hirsutism.  NECK: Neck symmetric, without masses or thyromegaly.  NODES: No inguinal, cervical, axillary or femoral lymph node enlargement.  CHEST: Good respiratory effort.   ABDOMEN: Soft. No tenderness or masses. No hepatosplenomegaly. No hernias.  BREASTS: Symmetrical, no skin changes, visible lesions, palpable masses or nipple discharge bilaterally.  PELVIC: External female genitalia without lesions.  Female hair distribution. Adequate perineal body, Normal urethral meatus. Vagina moist and well rugated without lesions or discharge.  No significant cystocele or rectocele present. Cervix pink without lesions, discharge or tenderness. Uterus is normal size, regular, mobile and nontender. Adnexa without masses or tenderness.  EXTREMITIES: No edema      DIAGNOSIS:  1. Women's annual routine gynecological examination        2. Encounter for Papanicolaou smear for cervical cancer screening  Liquid-Based Pap Smear, Screening      3. Encounter for screening examination for sexually transmitted disease  Hepatitis B Surface Antigen    Hepatitis C Antibody    HIV 1/2 Ag/Ab (4th Gen)    Vaginosis Screen by DNA Probe    C.  trachomatis/N. gonorrhoeae by AMP DNA    Treponema Pallidium Antibodies IgG, IgM      4. Acute vaginitis  Vaginosis Screen by DNA Probe    C. trachomatis/N. gonorrhoeae by AMP DNA    metroNIDAZOLE (FLAGYL) 500 MG tablet    fluconazole (DIFLUCAN) 150 MG Tab          PLAN:  - requests diflucan after flagyl   - Pap done today.  - Screening tests as ordered.  - Diet and exercise encouraged.  Condom use encouraged for STD prevention.  Seat belt use encouraged.  Reviewed ASCCP Pap guidelines and screening recommendations.    Counseling: indications for and frequency of periodic gynecologic exam    The patient was counseled today on ACS PAP guidelines, with recommendations for yearly pelvic exams unless their uterus, cervix, and ovaries were removed for benign reasons; in that case, examinations every 3-5 years are recommended.  The patient was also counseled regarding monthly breast self-examination, routine STD screening for at-risk populations, prophylactic immunizations for transmitted infections such as  HPV, Pertussis, or Influenza as appropriate, and yearly mammograms when indicated by ACS guidelines.  She was advised to see her primary care physician for all other health maintenance.    FOLLOW-UP with me annually.   Ammy Mcelroy PA-C

## 2025-06-13 RX ORDER — IBUPROFEN 800 MG/1
800 TABLET, FILM COATED ORAL EVERY 8 HOURS PRN
Qty: 60 TABLET | Refills: 2 | Status: SHIPPED | OUTPATIENT
Start: 2025-06-13 | End: 2026-06-13

## 2025-06-13 NOTE — TELEPHONE ENCOUNTER
Refill Routing Note   Medication(s) are not appropriate for processing by [facility] for the following reason(s):        Outside of protocol    [facility] action(s):  Route               Appointments  past 12m or future 3m with PCP    Date Provider   Last Visit   5/31/2024 Yani Koroma MD   Next Visit   Visit date not found Yani Koroma MD   ED visits in past 90 days: 0        Note composed:7:48 PM 06/12/2025

## 2025-07-21 ENCOUNTER — OFFICE VISIT (OUTPATIENT)
Dept: URGENT CARE | Facility: CLINIC | Age: 30
End: 2025-07-21
Payer: COMMERCIAL

## 2025-07-21 VITALS
BODY MASS INDEX: 31.77 KG/M2 | TEMPERATURE: 98 F | SYSTOLIC BLOOD PRESSURE: 126 MMHG | WEIGHT: 161.81 LBS | HEART RATE: 84 BPM | HEIGHT: 60 IN | OXYGEN SATURATION: 99 % | DIASTOLIC BLOOD PRESSURE: 85 MMHG | RESPIRATION RATE: 18 BRPM

## 2025-07-21 DIAGNOSIS — N89.8 VAGINAL DISCHARGE: Primary | ICD-10-CM

## 2025-07-21 DIAGNOSIS — N76.0 ACUTE VAGINITIS: ICD-10-CM

## 2025-07-21 LAB
B-HCG UR QL: NEGATIVE
BILIRUBIN, UA POC OHS: NEGATIVE
BLOOD, UA POC OHS: NEGATIVE
CLARITY, UA POC OHS: CLEAR
COLOR, UA POC OHS: YELLOW
CTP QC/QA: YES
GLUCOSE, UA POC OHS: NEGATIVE
KETONES, UA POC OHS: NEGATIVE
LEUKOCYTES, UA POC OHS: NEGATIVE
NITRITE, UA POC OHS: NEGATIVE
PH, UA POC OHS: 6
PROTEIN, UA POC OHS: NEGATIVE
SPECIFIC GRAVITY, UA POC OHS: >=1.03
UROBILINOGEN, UA POC OHS: 0.2

## 2025-07-21 PROCEDURE — 81025 URINE PREGNANCY TEST: CPT | Mod: S$GLB,,, | Performed by: NURSE PRACTITIONER

## 2025-07-21 PROCEDURE — 81003 URINALYSIS AUTO W/O SCOPE: CPT | Mod: QW,S$GLB,, | Performed by: NURSE PRACTITIONER

## 2025-07-21 PROCEDURE — 81515 NFCT DS BV&VAGINITIS DNA ALG: CPT | Performed by: NURSE PRACTITIONER

## 2025-07-21 PROCEDURE — 99214 OFFICE O/P EST MOD 30 MIN: CPT | Mod: S$GLB,,, | Performed by: NURSE PRACTITIONER

## 2025-07-21 RX ORDER — METRONIDAZOLE 500 MG/1
500 TABLET ORAL EVERY 12 HOURS
Qty: 14 TABLET | Refills: 0 | Status: SHIPPED | OUTPATIENT
Start: 2025-07-21 | End: 2025-07-28

## 2025-07-21 RX ORDER — FLUCONAZOLE 150 MG/1
150 TABLET ORAL
Qty: 2 TABLET | Refills: 0 | Status: SHIPPED | OUTPATIENT
Start: 2025-07-21

## 2025-07-21 NOTE — PROGRESS NOTES
Subjective:      Patient ID: Gurdeep Harris is a 30 y.o. female.    Vitals:  height is 5' (1.524 m) and weight is 73.4 kg (161 lb 13.1 oz). Her oral temperature is 98.4 °F (36.9 °C). Her blood pressure is 126/85 and her pulse is 84. Her respiration is 18 and oxygen saturation is 99%.     Chief Complaint: Abdominal Pain    Gurdeep Harris is a 30 y.o. female who presents today with a chief complaint of abdominal pain, vaginal bleeding and clotting, odor, heavy period, and painful intercourse that began 2 weeks ago.  Patient took OTC boric acid w/ no relief to symptoms.  Pt has a hx of frequent BV. Pt also reports a hx of endometriosis and believes that may be contributing to the heavy menstrual cycle.     Abdominal Pain  The current episode started 1 to 4 weeks ago. The pain is located in the periumbilical region. The pain is at a severity of 6/10. The quality of the pain is cramping. The abdominal pain does not radiate. Associated symptoms include constipation. Pertinent negatives include no anorexia, arthralgias, belching, diarrhea, dysuria, fever, flatus, frequency, headaches, hematochezia, hematuria, melena, myalgias, nausea, vomiting or weight loss.   Female  Problem  She complains of a genital odor and vaginal bleeding. Associated symptoms include abdominal pain, constipation and painful intercourse. Pertinent negatives include no anorexia, diarrhea, dysuria, fever, frequency, headaches, hematuria, nausea or vomiting.       Constitution: Negative for fever.   Gastrointestinal:  Positive for abdominal pain and constipation. Negative for nausea, vomiting, diarrhea and bright red blood in stool.   Genitourinary:  Negative for dysuria, frequency and hematuria.   Musculoskeletal:  Negative for joint pain and muscle ache.   Neurological:  Negative for headaches.      Objective:     Physical Exam   Constitutional: She is oriented to person, place, and time.   HENT:   Head: Normocephalic.   Ears:   Right Ear:  External ear normal.   Left Ear: External ear normal.   Nose: Nose normal.   Mouth/Throat: Mucous membranes are moist.   Eyes: Conjunctivae are normal.   Cardiovascular: Normal rate.   Pulmonary/Chest: Effort normal.   Musculoskeletal: Normal range of motion.         General: Normal range of motion.   Neurological: She is alert and oriented to person, place, and time.   Skin: Skin is dry.   Psychiatric: Her behavior is normal.   Nursing note and vitals reviewed.    Results for orders placed or performed in visit on 07/21/25   POCT urine pregnancy    Collection Time: 07/21/25  3:59 PM   Result Value Ref Range    POC Preg Test, Ur Negative Negative     Acceptable Yes    POCT Urinalysis(Instrument)    Collection Time: 07/21/25  4:00 PM   Result Value Ref Range    Color, POC UA Yellow Yellow, Straw, Colorless    Clarity, POC UA Clear Clear    Glucose, POC UA Negative Negative    Bilirubin, POC UA Negative Negative    Ketones, POC UA Negative Negative    Spec Grav POC UA >=1.030 1.005 - 1.030    Blood, POC UA Negative Negative    pH, POC UA 6.0 5.0 - 8.0    Protein, POC UA Negative Negative    Urobilinogen, POC UA 0.2 <=1.0    Nitrite, POC UA Negative Negative    WBC, POC UA Negative Negative     Assessment:     1. Vaginal discharge    2. Acute vaginitis        Plan:   Pt has an appt w/ Obgyn in 2 days.     Vaginal discharge  -     POCT Urinalysis(Instrument)  -     POCT urine pregnancy  -     Vaginosis Screen by DNA Probe  -     metroNIDAZOLE (FLAGYL) 500 MG tablet; Take 1 tablet (500 mg total) by mouth every 12 (twelve) hours. for 7 days  Dispense: 14 tablet; Refill: 0  -     fluconazole (DIFLUCAN) 150 MG Tab; Take 1 tablet (150 mg total) by mouth every 72 hours.  Dispense: 2 tablet; Refill: 0    Acute vaginitis      Patient Instructions   - You must understand that you have received an Urgent Care treatment only and that you may be released before all of your medical problems are known or treated.   -  You, the patient, will arrange for follow up care as instructed.   - If your condition worsens or fails to improve we recommend that you receive another evaluation at the ER immediately or contact your PCP to discuss your concerns.   - You can call [phone] or [phone] to help schedule an appointment with the appropriate provider.    Keep appointment on 7/23 with Obgyn for follow up  Complete all antibiotics unless otherwise instructed  DO NOT drink alcohol while taking the metronidazole. You could become very ill if you do  We will call you with your test results, they will also be available in the patient portal

## 2025-07-21 NOTE — PATIENT INSTRUCTIONS
- You must understand that you have received an Urgent Care treatment only and that you may be released before all of your medical problems are known or treated.   - You, the patient, will arrange for follow up care as instructed.   - If your condition worsens or fails to improve we recommend that you receive another evaluation at the ER immediately or contact your PCP to discuss your concerns.   - You can call [phone] or [phone] to help schedule an appointment with the appropriate provider.    Keep appointment on 7/23 with Obgyn for follow up  Complete all antibiotics unless otherwise instructed  DO NOT drink alcohol while taking the metronidazole. You could become very ill if you do  We will call you with your test results, they will also be available in the patient portal